# Patient Record
Sex: FEMALE | Race: WHITE | Employment: UNEMPLOYED | URBAN - METROPOLITAN AREA
[De-identification: names, ages, dates, MRNs, and addresses within clinical notes are randomized per-mention and may not be internally consistent; named-entity substitution may affect disease eponyms.]

---

## 2017-02-21 ENCOUNTER — APPOINTMENT (OUTPATIENT)
Dept: PHYSICAL THERAPY | Age: 52
End: 2017-02-21
Payer: COMMERCIAL

## 2017-02-28 ENCOUNTER — HOSPITAL ENCOUNTER (OUTPATIENT)
Dept: PHYSICAL THERAPY | Age: 52
Discharge: HOME | End: 2017-02-28
Payer: COMMERCIAL

## 2017-02-28 PROCEDURE — 97110 THERAPEUTIC EXERCISES: CPT

## 2017-02-28 PROCEDURE — 97161 PT EVAL LOW COMPLEX 20 MIN: CPT

## 2017-02-28 NOTE — PROGRESS NOTES
Elia Chavarria  : 1965 [facility] at [address]  [phone]   [phone]         OUTPATIENT PHYSICAL THERAPY:Initial Assessment, Treatment Day: Day of Assessment and PM 2017    ICD-10: Treatment Diagnosis: Low back pain (M54.5)           Low back pain (M54.5)   Muscle spasm of back (M62.830)   SPRAIN OF LIGAMENTS OF THE LUMBAR SPINE, SEQUELA S33. 5XXS          Precautions/Allergies:   Oxycodone   Fall Risk Score: 0 (? 5 = High Risk)  MD Orders: Eval and Treat  MEDICAL/REFERRING DIAGNOSIS:  Low back pain [M54.5]  Other chronic pain [G89.29]   DATE OF ONSET: Chronic  REFERRING PHYSICIAN: Kanchan Hoskins MD  RETURN PHYSICIAN APPOINTMENT: TBD by patient     INITIAL ASSESSMENT:  Ms. Elia Chavarria has attended 1 physical therapy session including initial evaluation. Elia Chavarria exhibits decreased flexibility, increased pain, decreased postural and core strength, decreased functional tolerance consistent with hypomobility lumbar spine. Pain is worse on R posterior hip/buttock and lower lumbar level as compared to Left. No pelvic malalignment upon initial evaluation but decreased posture. Elia Chavarria will benefit from skilled PT (medically necessary) to address above deficits affecting participation in basic ADLs and overall functional tolerance. Manual techniques (stretching, joint mobilizations, soft tissue mobilization/myofascial release), postural exercises/education, therapeutic techniques/activities, and HEP will be performed as appropriate addressing Adolph Mueller current condition. PROBLEM LIST (Impacting functional limitations):  1. Decreased Strength  2. Decreased ADL/Functional Activities  3. Decreased Transfer Abilities  4. Decreased Ambulation Ability/Technique  5. Decreased Balance  6. Increased Pain  7. Decreased Activity Tolerance  8. Increased Fatigue  9. Increased Shortness of Breath  10.  Decreased Flexibility/Joint Mobility  11. Decreased [place] with Home Exercise Program INTERVENTIONS PLANNED:  1. Balance Exercise  2. Bed Mobility  3. Cold  4. Cryotherapy  5. Electrical Stimulation  6. Family Education  7. Gait Training  8. Heat  9. Home Exercise Program (HEP)  10. Manual Therapy  11. Neuromuscular Re-education/Strengthening  12. Range of Motion (ROM)  13. Therapeutic Activites  14. Therapeutic Exercise/Strengthening  15. Transfer Training   TREATMENT PLAN:  Effective Dates: 2/28/17 TO 5.28.17. Frequency/Duration: 2 times a week for 12 weeks   GOALS: (Goals have been discussed and agreed upon with patient.)  Short Term Goals 4 weeks   1. Aaron Gloria will be independent with HEP  2. Aaron Gloria will participate in LE stretching program to increase flexibility  3. Aaron Gloria will participate in core stabilization exercises to help with stabilization during ADLs  4. Aaron Gloria will participate in LE strengthening program with weights as appropriate to help with gait and elevations  5. Aaron Gloria will participate in static and dynamic balance activities to decrease the risk for falls  6. Aaron Gloria will tolerate manual therapy/joint mobilizations/soft tissue to increase ROM and decrease pain  7. Long Term Goals 8 weeks   1. Aaron Gloria will demonstrate a 10 point improvement on the Oswestry to show improvement in function  2. Aaron Gloria will report 0/10 pain at rest and during ADLs  3. Aaron Gloria will demonstrate 5/5 LE strength on manual muscle testing  4. Aaron Gloria will be able to perform SLS >5 seconds bilaterally to help with gait and improve balance  Rehabilitation Potential For Stated Goals: Good  Regarding [facility], I certify that the treatment plan above will be carried out by a therapist or under their direction.   Thank you for this referral,  Antonio Koo DPT     Referring Physician Signature: Geneva Hu MD              Date                    HISTORY:   History of Present Injury/Illness (Reason for Referral):  I've had this pain for 4-5 years. I think it's arthritis, but I've never had it Xray. I told the MD about it and he sent me here. I take Prednizone for my back pain, but it didn't help. The pain sometimes goes down my legs but not past my knee. The pain is on the R side when it radiates down sometimes--not daily. My back pain is just about every day. I am at about a 6/10. The pain gets worse or stays the same, but it doesn't improve more than a 6/10. The pain keeps me up at night. The pain is worse in the morning. Past Medical History/Comorbidities:   Ms. Josemanuel Arellano  has a past medical history of Allergic rhinitis; Anxiety; Deafness; Dysthymia (or depressive neurosis); GERD (gastroesophageal reflux disease); Migraine with aura; and Osteoarthritis. Ms. Josemanuel Arellano  has a past surgical history that includes other surgical; tonsil and adenoidectomy; wisdom teeth extraction; tubal ligation; dilation and curettage; and colonoscopy (2013). Social History/Living Environment:       Social History     Social History    Marital status:      Spouse name: N/A    Number of children: N/A    Years of education: N/A     Occupational History    homemaker       1 year college     Social History Main Topics    Smoking status: Never Smoker    Smokeless tobacco: Never Used    Alcohol use No    Drug use: No    Sexual activity: Not on file     Other Topics Concern    Not on file     Social History Narrative     Prior Level of Function/Work/Activity:  Independent with regards to ambulation  Note: Patient denies any increase of symptoms with cough, sneeze or valsalva. Patient denies any saddle paresthesia or bowel/bladder deficits. Personal Factors:          Sex:  female        Age:  45 y.o.   Current Medications:    Current Outpatient Prescriptions:     LORazepam (ATIVAN) 1 mg tablet, Take 1 mg by mouth., Disp: , Rfl:     raNITIdine (ZANTAC) 15 mg/mL syrup, Take 75 mg by mouth., Disp: , Rfl:     busPIRone (BUSPAR) 10 mg tablet, TK ONE T PO BID, Disp: , Rfl:     prazosin (MINIPRESS) 1 mg capsule, Take 1 mg by mouth., Disp: , Rfl:     guanFACINE (TENEX) 1 mg tablet, TK 1 T PO QD, Disp: , Rfl: 4    PROCTOZONE-HC 2.5 % rectal cream, I REC BID, Disp: , Rfl: 0    venlafaxine-SR (EFFEXOR-XR) 75 mg capsule, TK ONE C PO  QAM, Disp: , Rfl: 5    zolpidem (AMBIEN) 5 mg tablet, TK 1 T PO  QHS PRN, Disp: , Rfl: 3    neomycin-polymyxin-hydrocortisone, buffered, (PEDIOTIC) 3.5-10,000-1 mg/mL-unit/mL-% otic suspension, Administer 3 Drops in left ear four (4) times daily. , Disp: 30 mL, Rfl: 0    naproxen (NAPROSYN) 375 mg tablet, Take 1 Tab by mouth two (2) times daily (with meals). , Disp: 20 Tab, Rfl: 0    escitalopram oxalate (LEXAPRO) 20 mg tablet, Take 1 Tab by mouth daily. , Disp: 30 Tab, Rfl: 3    SUMAtriptan (IMITREX) 100 mg tablet, Take 1 Tab by mouth once as needed for Migraine for up to 1 dose., Disp: 9 Tab, Rfl: 3     Date Last Reviewed:  2/28/2017   Number of Personal Factors/Comorbidities that affect the Plan of Care: 0: LOW COMPLEXITY   EXAMINATION:   Observation/Orthostatic Postural Assessment:          Leans to R side in sitting, Forward head and rounded shoulders. No AD with gait. Palpation:          Sacrum and L4/5 on R as well as L. PSIS bilaterally. Minimal improvement with STM. Grade II mobs not effective per patient response. Pain sensitive. ROM:            AROM/PROM         Joint: Eval Date: 2.28.17  Re-Assess Date:  Re-Assess Date:     RIGHT LEFT RIGHT LEFT RIGHT LEFT   Knee Extension [facility]       Knee Flexion [facility]       Hip Flexion 75 deg Ham 90/90 80 deg Ham 90/90       Hip Abduction [facility] WFL       Lumbar Flexion 80% with throbbing pain        Lumbar Extension 80% with sharp pain to middle back (8/10).         Lumbar Side-bending 80% with sharp pain to middle of her back L3-5 level--8/10. 80% with sharp pain to middle of her back L3-5 level--8/10       Lumbar Rotation 80% with pain to lower back (8/10) sharp pain. 90% with 6/10 pain to lower back         Strength:    Joint: Eval Date: 2.28.17  Re-Assess Date:  Re-Assess Date:     RIGHT LEFT RIGHT LEFT RIGHT LEFT   Knee Flexion 4+/5 5/5       Knee Extension 4/5 5/5       Hip Flexion 4/5 4+/5       Hip Abduction 4/5 4+/5       Ankle Dorsiflexion 5/5 5/5                           Single leg stance right/left: No difficulty              Deep squat: Unable to pick things up all the time at home due to pain---not able to perform deep squat, but can lift light objects off the floor. Ham 90/90:   RIGHT LE: 75 deg   LEFT LE: 80 deg    Special Tests: Assessed @ Initial Visit    JOSSY 4:  Decreased flexibility bilaterally (R>L)   SLUMP TEST:  Negative for neurological symptoms. Neurological Screen: Assessed @ Initial Visit    RADIATING SYMPTOMS?: YES/NO--Occasionally goes down her R leg but not past buttock. No radiating pain upon PT evaluation. Functional Mobility:  Assessed @ Initial Visit Affecting participation in basic ADLs and functional tasks. Balance:          No difficulty. , No Hx of falls. Body Structures Involved:  1. Nerves  2. Joints  3. Muscles  4. Ligaments Body Functions Affected:  1. Sensory/Pain  2. Neuromusculoskeletal  3. Movement Related Activities and Participation Affected:  1. Mobility  2. Self Care   Number of elements that affect the Plan of Care: 4+: HIGH COMPLEXITY   CLINICAL PRESENTATION:   Presentation: Stable and uncomplicated: LOW COMPLEXITY   CLINICAL DECISION MAKING:   Outcome Measure: Tool Used: Modified Oswestry Low Back Pain Questionnaire  Score:  Initial: 21/50  Most Recent: X/50 (Date: -- )   Interpretation of Score: Each section is scored on a 0-5 scale, 5 representing the greatest disability. The scores of each section are added together for a total score of 50. Score 0 1-10 11-20 21-30 31-40 41-49 50   Modifier CH CI CJ CK CL CM CN     ?  Mobility - Walking and Moving Around:     - CURRENT STATUS: CK - 40%-59% impaired, limited or restricted    - GOAL STATUS: CJ - 20%-39% impaired, limited or restricted    - D/C STATUS:  ---------------To be determined---------------    Medical Necessity:   · Skilled intervention continues to be required due to above deficits affecting participation in basic ADLs and overall functional tolerance. Reason for Services/Other Comments:  · Patient continues to require skilled intervention due to  above deficits affecting participation in basic ADLs and overall functional tolerance. Use of outcome tool(s) and clinical judgement create a POC that gives a: Clear prediction of patient's progress: LOW COMPLEXITY   TREATMENT:   (In addition to Assessment/Re-Assessment sessions the following treatments were rendered)  THERAPEUTIC EXERCISE: (10 minutes):  Exercises per grid below to improve mobility, strength and balance. Required minimal visual and verbal cues to promote proper body alignment and promote proper body posture. Progressed resistance, range and complexity of movement as indicated. Date:  2/28/17 Date:   Date:     Activity/Exercise Parameters Parameters Parameters   Hamstring Stretch 30\"x3     LTR 10\"X10     SKTC 30\"x3                                   MANUAL THERAPY: ( minutes): Joint mobilization, Soft tissue mobilization and Manipulation was utilized and necessary because of the patient's restricted joint motion, painful spasm, restricted motion of soft tissue. (Used abbreviations: MET - muscle energy technique; PNF - proprioceptive neuromuscular facilitation; NMR - neuromuscular re-education; AP - anterior to posterior; PA - posterior to anterior)    MODALITIES: (unbillable  minutes):   Performed MHP with Amanda White prone end of session -- provided with call button and asked  To inform PT if MHP gets too warm. No adverse reactions end of session. Treatment/Session Assessment:  Verbalized understanding of POC and PT role + HEP. Recommending MHP beginning of session and myofascial release/STM to R low back. Minimal exercise at the beginning (maybe stretching?)  · Pain/ Symptoms: Initial:   6/10 Post Session:  5/10 ·   Compliance with Program/Exercises: Will assess as treatment progresses. · Recommendations/Intent for next treatment session: \"Next visit will focus on advancements to more challenging activities\".   Total Treatment Duration:  PT Patient Time In/Time Out  Time In: 1300  Time Out: [address], DPT

## 2017-02-28 NOTE — PROGRESS NOTES
Ambulatory/Rehab Services H2 Model Falls Risk Assessment    Risk Factor Pts. ·   Confusion/Disorientation/Impulsivity  []    4 ·   Symptomatic Depression  []   2 ·   Altered Elimination  []   1 ·   Dizziness/Vertigo  []   1 ·   Gender (Male)  []   1 ·   Any administered antiepileptics (anticonvulsants):  []   2 ·   Any administered benzodiazepines:  []   1 ·   Visual Impairment (specify):  []   1 ·   Portable Oxygen Use  []   1 ·   Orthostatic ? BP  []   1 ·   History of Recent Falls (within 3 mos.)  []   5     Ability to Rise from Chair (choose one) Pts. ·   Ability to rise in a single movement  []   0 ·   Pushes up, successful in one attempt  []   1 ·   Multiple attempts, but successful  []   3 ·   Unable to rise without assistance  []   4   Total: (5 or greater = High Risk) 0     Falls Prevention Plan:   []                Physical Limitations to Exercise (specify):   []                Mobility Assistance Device (type):   []                Exercise/Equipment Adaptation (specify):    ©2010 [facility] [address] Patent #[id number].  Federal Law prohibits the replication, distribution or use without written permission from [facility] [employer]

## 2017-03-21 ENCOUNTER — APPOINTMENT (OUTPATIENT)
Dept: PHYSICAL THERAPY | Age: 52
End: 2017-03-21

## 2017-03-28 ENCOUNTER — APPOINTMENT (OUTPATIENT)
Dept: PHYSICAL THERAPY | Age: 52
End: 2017-03-28

## 2017-07-18 PROBLEM — R87.612 LGSIL ON PAP SMEAR OF CERVIX: Status: ACTIVE | Noted: 2017-03-06

## 2017-07-18 PROBLEM — R87.612 LGSIL ON PAP SMEAR OF CERVIX: Status: RESOLVED | Noted: 2017-03-06 | Resolved: 2017-07-18

## 2023-04-05 ENCOUNTER — APPOINTMENT (OUTPATIENT)
Dept: CT IMAGING | Age: 58
End: 2023-04-05
Payer: COMMERCIAL

## 2023-04-05 ENCOUNTER — HOSPITAL ENCOUNTER (EMERGENCY)
Age: 58
Discharge: HOME | End: 2023-04-05
Attending: EMERGENCY MEDICINE
Payer: COMMERCIAL

## 2023-04-05 VITALS
SYSTOLIC BLOOD PRESSURE: 115 MMHG | TEMPERATURE: 98.8 F | DIASTOLIC BLOOD PRESSURE: 67 MMHG | WEIGHT: 120 LBS | HEART RATE: 66 BPM | BODY MASS INDEX: 24.19 KG/M2 | RESPIRATION RATE: 16 BRPM | OXYGEN SATURATION: 96 % | HEIGHT: 59 IN

## 2023-04-05 DIAGNOSIS — R10.84 GENERALIZED ABDOMINAL PAIN: Primary | ICD-10-CM

## 2023-04-05 LAB
ALBUMIN SERPL-MCNC: 4.2 G/DL (ref 3.5–5)
ALBUMIN/GLOB SERPL: 1.1 (ref 0.4–1.6)
ALP SERPL-CCNC: 50 U/L (ref 50–136)
ALT SERPL-CCNC: 27 U/L (ref 12–65)
ANION GAP SERPL CALC-SCNC: 2 MMOL/L (ref 2–11)
AST SERPL-CCNC: 15 U/L (ref 15–37)
BASOPHILS # BLD: 0 K/UL (ref 0–0.2)
BASOPHILS NFR BLD: 0 % (ref 0–2)
BILIRUB SERPL-MCNC: 0.5 MG/DL (ref 0.2–1.1)
BILIRUB UR QL: NEGATIVE
BUN SERPL-MCNC: 15 MG/DL (ref 6–23)
CALCIUM SERPL-MCNC: 9.8 MG/DL (ref 8.3–10.4)
CHLORIDE SERPL-SCNC: 108 MMOL/L (ref 101–110)
CO2 SERPL-SCNC: 29 MMOL/L (ref 21–32)
CREAT SERPL-MCNC: 0.7 MG/DL (ref 0.6–1)
DIFFERENTIAL METHOD BLD: ABNORMAL
EOSINOPHIL # BLD: 0.1 K/UL (ref 0–0.8)
EOSINOPHIL NFR BLD: 1 % (ref 0.5–7.8)
ERYTHROCYTE [DISTWIDTH] IN BLOOD BY AUTOMATED COUNT: 11.8 % (ref 11.9–14.6)
GLOBULIN SER CALC-MCNC: 3.9 G/DL (ref 2.8–4.5)
GLUCOSE SERPL-MCNC: 90 MG/DL (ref 65–100)
GLUCOSE UR QL STRIP.AUTO: NEGATIVE MG/DL
HCT VFR BLD AUTO: 41.7 % (ref 35.8–46.3)
HGB BLD-MCNC: 14 G/DL (ref 11.7–15.4)
IMM GRANULOCYTES # BLD AUTO: 0 K/UL (ref 0–0.5)
IMM GRANULOCYTES NFR BLD AUTO: 0 % (ref 0–5)
KETONES UR-MCNC: NEGATIVE MG/DL
LACTATE SERPL-SCNC: 0.9 MMOL/L (ref 0.4–2)
LEUKOCYTE ESTERASE UR QL STRIP: NEGATIVE
LIPASE SERPL-CCNC: 88 U/L (ref 73–393)
LYMPHOCYTES # BLD: 1.5 K/UL (ref 0.5–4.6)
LYMPHOCYTES NFR BLD: 21 % (ref 13–44)
MCH RBC QN AUTO: 32.6 PG (ref 26.1–32.9)
MCHC RBC AUTO-ENTMCNC: 33.6 G/DL (ref 31.4–35)
MCV RBC AUTO: 97.2 FL (ref 82–102)
MONOCYTES # BLD: 0.4 K/UL (ref 0.1–1.3)
MONOCYTES NFR BLD: 6 % (ref 4–12)
NEUTS SEG # BLD: 5 K/UL (ref 1.7–8.2)
NEUTS SEG NFR BLD: 72 % (ref 43–78)
NITRITE UR QL: NEGATIVE
NRBC # BLD: 0 K/UL (ref 0–0.2)
PH UR: 5.5 (ref 5–9)
PLATELET # BLD AUTO: 300 K/UL (ref 150–450)
PMV BLD AUTO: 9.6 FL (ref 9.4–12.3)
POTASSIUM SERPL-SCNC: 4.4 MMOL/L (ref 3.5–5.1)
PROT SERPL-MCNC: 8.1 G/DL (ref 6.3–8.2)
PROT UR QL: NEGATIVE MG/DL
RBC # BLD AUTO: 4.29 M/UL (ref 4.05–5.2)
RBC # UR STRIP: NEGATIVE
SERVICE CMNT-IMP: ABNORMAL
SODIUM SERPL-SCNC: 139 MMOL/L (ref 133–143)
SP GR UR: 1.02 (ref 1–1.02)
UROBILINOGEN UR QL: 0.2 EU/DL (ref 0.2–1)
WBC # BLD AUTO: 7.1 K/UL (ref 4.3–11.1)

## 2023-04-05 PROCEDURE — 6360000004 HC RX CONTRAST MEDICATION: Performed by: STUDENT IN AN ORGANIZED HEALTH CARE EDUCATION/TRAINING PROGRAM

## 2023-04-05 PROCEDURE — 96374 THER/PROPH/DIAG INJ IV PUSH: CPT

## 2023-04-05 PROCEDURE — 85025 COMPLETE CBC W/AUTO DIFF WBC: CPT

## 2023-04-05 PROCEDURE — 6370000000 HC RX 637 (ALT 250 FOR IP): Performed by: STUDENT IN AN ORGANIZED HEALTH CARE EDUCATION/TRAINING PROGRAM

## 2023-04-05 PROCEDURE — 83690 ASSAY OF LIPASE: CPT

## 2023-04-05 PROCEDURE — 96375 TX/PRO/DX INJ NEW DRUG ADDON: CPT

## 2023-04-05 PROCEDURE — 99285 EMERGENCY DEPT VISIT HI MDM: CPT

## 2023-04-05 PROCEDURE — 74177 CT ABD & PELVIS W/CONTRAST: CPT

## 2023-04-05 PROCEDURE — 80053 COMPREHEN METABOLIC PANEL: CPT

## 2023-04-05 PROCEDURE — 83605 ASSAY OF LACTIC ACID: CPT

## 2023-04-05 PROCEDURE — 81003 URINALYSIS AUTO W/O SCOPE: CPT

## 2023-04-05 PROCEDURE — 2580000003 HC RX 258: Performed by: STUDENT IN AN ORGANIZED HEALTH CARE EDUCATION/TRAINING PROGRAM

## 2023-04-05 PROCEDURE — 6360000002 HC RX W HCPCS: Performed by: STUDENT IN AN ORGANIZED HEALTH CARE EDUCATION/TRAINING PROGRAM

## 2023-04-05 RX ORDER — SODIUM CHLORIDE 0.9 % (FLUSH) 0.9 %
10 SYRINGE (ML) INJECTION
Status: COMPLETED | OUTPATIENT
Start: 2023-04-05 | End: 2023-04-05

## 2023-04-05 RX ORDER — ONDANSETRON 2 MG/ML
4 INJECTION INTRAMUSCULAR; INTRAVENOUS
Status: COMPLETED | OUTPATIENT
Start: 2023-04-05 | End: 2023-04-05

## 2023-04-05 RX ORDER — PANTOPRAZOLE SODIUM 40 MG/1
40 TABLET, DELAYED RELEASE ORAL DAILY
Qty: 30 TABLET | Refills: 0 | Status: SHIPPED | OUTPATIENT
Start: 2023-04-05 | End: 2023-05-05

## 2023-04-05 RX ORDER — KETOROLAC TROMETHAMINE 15 MG/ML
15 INJECTION, SOLUTION INTRAMUSCULAR; INTRAVENOUS ONCE
Status: COMPLETED | OUTPATIENT
Start: 2023-04-05 | End: 2023-04-05

## 2023-04-05 RX ORDER — 0.9 % SODIUM CHLORIDE 0.9 %
100 INTRAVENOUS SOLUTION INTRAVENOUS ONCE
Status: COMPLETED | OUTPATIENT
Start: 2023-04-05 | End: 2023-04-05

## 2023-04-05 RX ORDER — HYOSCYAMINE SULFATE 0.12 MG/1
1 TABLET SUBLINGUAL 3 TIMES DAILY PRN
Qty: 60 EACH | Refills: 0 | Status: SHIPPED | OUTPATIENT
Start: 2023-04-05

## 2023-04-05 RX ORDER — HYDROCODONE BITARTRATE AND ACETAMINOPHEN 5; 325 MG/1; MG/1
1 TABLET ORAL
Status: COMPLETED | OUTPATIENT
Start: 2023-04-05 | End: 2023-04-05

## 2023-04-05 RX ORDER — ONDANSETRON 4 MG/1
4 TABLET, FILM COATED ORAL 3 TIMES DAILY PRN
Qty: 15 TABLET | Refills: 0 | Status: SHIPPED | OUTPATIENT
Start: 2023-04-05

## 2023-04-05 RX ADMIN — SODIUM CHLORIDE, PRESERVATIVE FREE 10 ML: 5 INJECTION INTRAVENOUS at 13:07

## 2023-04-05 RX ADMIN — SODIUM CHLORIDE 100 ML: 9 INJECTION, SOLUTION INTRAVENOUS at 13:07

## 2023-04-05 RX ADMIN — KETOROLAC TROMETHAMINE 15 MG: 15 INJECTION, SOLUTION INTRAMUSCULAR; INTRAVENOUS at 13:29

## 2023-04-05 RX ADMIN — HYDROCODONE BITARTRATE AND ACETAMINOPHEN 1 TABLET: 5; 325 TABLET ORAL at 13:28

## 2023-04-05 RX ADMIN — IOPAMIDOL 100 ML: 755 INJECTION, SOLUTION INTRAVENOUS at 13:07

## 2023-04-05 RX ADMIN — ONDANSETRON 4 MG: 2 INJECTION INTRAMUSCULAR; INTRAVENOUS at 13:30

## 2023-04-05 RX ADMIN — HYOSCYAMINE SULFATE 250 MCG: 0.12 TABLET ORAL; SUBLINGUAL at 13:27

## 2023-04-05 ASSESSMENT — ENCOUNTER SYMPTOMS
EYE PAIN: 0
EYE DISCHARGE: 0
TROUBLE SWALLOWING: 0
DIARRHEA: 0
SHORTNESS OF BREATH: 0
VOMITING: 0
VOICE CHANGE: 0
RHINORRHEA: 0
ABDOMINAL PAIN: 1
NAUSEA: 1
PHOTOPHOBIA: 0
FACIAL SWELLING: 0
CHEST TIGHTNESS: 0
EYE REDNESS: 0
SORE THROAT: 0
APNEA: 0
COUGH: 0
WHEEZING: 0

## 2023-04-05 ASSESSMENT — PAIN DESCRIPTION - LOCATION
LOCATION: ABDOMEN

## 2023-04-05 ASSESSMENT — PAIN SCALES - GENERAL
PAINLEVEL_OUTOF10: 7
PAINLEVEL_OUTOF10: 3
PAINLEVEL_OUTOF10: 6

## 2023-04-05 ASSESSMENT — PAIN - FUNCTIONAL ASSESSMENT: PAIN_FUNCTIONAL_ASSESSMENT: 0-10

## 2023-04-05 NOTE — Clinical Note
Vicenta Carver was seen and treated in our emergency department on 4/5/2023. She may return to work on 04/06/2023. If you have any questions or concerns, please don't hesitate to call.       Jorge Alberto Crespo

## 2023-04-05 NOTE — DISCHARGE INSTRUCTIONS
Your work-up is reassuring today. There is no clear cause of your symptoms. It is possible you have some stomach inflammation or some constipation. You may try the prescribed medications to help relieve your symptoms. Return in 24 to 48 hours if your symptoms worsen or persist.  Follow-up with your family doctor otherwise. As we discussed, I did not find a life threatening cause of your symptoms today. However, THAT DOES NOT MEAN IT COULD NOT DEVELOP. If you develop ANY new or worsening symptoms, it is critical that you return for re-evaluation. This includes any symptoms that are concerning to you, especially symptoms such as fever, severe pain, blood in stool, vomiting. If you do not return for re-evaluation, you risk serious complications, including death.

## 2023-04-05 NOTE — ED PROVIDER NOTES
discharge. Left eye: No discharge. Conjunctiva/sclera: Conjunctivae normal.   Cardiovascular:      Rate and Rhythm: Normal rate and regular rhythm. Pulses: Normal pulses. Heart sounds: Normal heart sounds. Pulmonary:      Effort: Pulmonary effort is normal. No respiratory distress. Breath sounds: Normal breath sounds. No stridor. No wheezing, rhonchi or rales. Abdominal:      General: Abdomen is flat. There is distension (Very mild). Palpations: Abdomen is soft. Tenderness: There is abdominal tenderness in the periumbilical area and left lower quadrant. There is no right CVA tenderness, left CVA tenderness, guarding or rebound. Negative signs include White's sign, Rovsing's sign, McBurney's sign, psoas sign and obturator sign. Hernia: No hernia is present. Genitourinary:     Comments: Declined  Musculoskeletal:         General: Normal range of motion. Cervical back: Normal range of motion and neck supple. Skin:     General: Skin is warm and dry. Capillary Refill: Capillary refill takes less than 2 seconds. Coloration: Skin is not jaundiced. Neurological:      General: No focal deficit present. Mental Status: She is alert and oriented to person, place, and time. Mental status is at baseline.         Procedures     Procedures    Orders Placed This Encounter   Procedures    CT ABDOMEN PELVIS W IV CONTRAST Additional Contrast? None    CBC with Diff    CMP    Lipase    Lactate, Sepsis (Select if patient is over 65 to rule out mesenteric ischemia)    Diet NPO    POCT Urine Dipstick    POCT Urinalysis no Micro    Saline lock IV        Medications   hyoscyamine (LEVSIN/SL) sublingual tablet 250 mcg (250 mcg SubLINGual Given 4/5/23 1327)   ketorolac (TORADOL) injection 15 mg (15 mg IntraVENous Given 4/5/23 1329)   ondansetron (ZOFRAN) injection 4 mg (4 mg IntraVENous Given 4/5/23 1330)   HYDROcodone-acetaminophen (NORCO) 5-325 MG per tablet 1 tablet (1

## 2023-04-05 NOTE — ED NOTES
I have reviewed discharge instructions with the patient. The patient verbalized understanding. Patient left ED via Discharge Method: ambulatory to Home with mother. Opportunity for questions and clarification provided. Patient given 3 scripts. To continue your aftercare when you leave the hospital, you may receive an automated call from our care team to check in on how you are doing. This is a free service and part of our promise to provide the best care and service to meet your aftercare needs.  If you have questions, or wish to unsubscribe from this service please call [phone]. Thank you for Choosing our [facility] Emergency Department.        Tomeka Ingram RN  04/05/23 7440

## 2023-04-05 NOTE — ED TRIAGE NOTES
Pt ambulatory to triage c/o abdominal pain 7/10 x 4 days. Nausea and bloating with abdominal swelling.  Last BM this morning. (-) Fevers

## 2025-04-10 ENCOUNTER — HOSPITAL ENCOUNTER (INPATIENT)
Age: 60
LOS: 10 days | Discharge: HOME | DRG: 234 | End: 2025-04-21
Attending: INTERNAL MEDICINE | Admitting: THORACIC SURGERY (CARDIOTHORACIC VASCULAR SURGERY)
Payer: COMMERCIAL

## 2025-04-10 ENCOUNTER — APPOINTMENT (OUTPATIENT)
Dept: GENERAL RADIOLOGY | Age: 60
DRG: 234 | End: 2025-04-10
Payer: COMMERCIAL

## 2025-04-10 ENCOUNTER — HOSPITAL ENCOUNTER (EMERGENCY)
Age: 60
Discharge: ANOTHER HOSPITAL | DRG: 234 | End: 2025-04-10
Attending: STUDENT IN AN ORGANIZED HEALTH CARE EDUCATION/TRAINING PROGRAM
Payer: COMMERCIAL

## 2025-04-10 VITALS
HEIGHT: 58 IN | BODY MASS INDEX: 25.82 KG/M2 | RESPIRATION RATE: 18 BRPM | SYSTOLIC BLOOD PRESSURE: 112 MMHG | DIASTOLIC BLOOD PRESSURE: 98 MMHG | TEMPERATURE: 98.1 F | HEART RATE: 83 BPM | OXYGEN SATURATION: 94 % | WEIGHT: 123 LBS

## 2025-04-10 DIAGNOSIS — Z95.1 S/P CABG X 3: ICD-10-CM

## 2025-04-10 DIAGNOSIS — R07.9 CHEST PAIN, UNSPECIFIED TYPE: Primary | ICD-10-CM

## 2025-04-10 DIAGNOSIS — I21.4 NSTEMI (NON-ST ELEVATED MYOCARDIAL INFARCTION) (HCC): ICD-10-CM

## 2025-04-10 DIAGNOSIS — I21.4 NSTEMI (NON-ST ELEVATED MYOCARDIAL INFARCTION) (HCC): Primary | ICD-10-CM

## 2025-04-10 DIAGNOSIS — R07.9 CHEST PAIN: ICD-10-CM

## 2025-04-10 PROBLEM — R73.09 ELEVATED GLUCOSE LEVEL: Status: ACTIVE | Noted: 2025-04-10

## 2025-04-10 LAB
ALBUMIN SERPL-MCNC: 4.2 G/DL (ref 3.2–4.6)
ALBUMIN/GLOB SERPL: 1.4 (ref 1–1.9)
ALP SERPL-CCNC: 63 U/L (ref 35–104)
ALT SERPL-CCNC: 16 U/L (ref 12–65)
ANION GAP SERPL CALC-SCNC: 13 MMOL/L (ref 7–16)
APTT PPP: 21.6 SEC (ref 23.3–37.4)
AST SERPL-CCNC: 37 U/L (ref 15–37)
BASOPHILS # BLD: 0.02 K/UL (ref 0–0.2)
BASOPHILS NFR BLD: 0.2 % (ref 0–2)
BILIRUB SERPL-MCNC: 0.3 MG/DL (ref 0–1.2)
BUN SERPL-MCNC: 22 MG/DL (ref 8–23)
CALCIUM SERPL-MCNC: 9.4 MG/DL (ref 8.8–10.2)
CHLORIDE SERPL-SCNC: 101 MMOL/L (ref 98–107)
CO2 SERPL-SCNC: 23 MMOL/L (ref 20–29)
CREAT SERPL-MCNC: 0.66 MG/DL (ref 0.8–1.3)
CRP SERPL-MCNC: <0.3 MG/DL (ref 0–0.9)
D DIMER PPP FEU-MCNC: 0.41 UG/ML(FEU)
DIFFERENTIAL METHOD BLD: ABNORMAL
EOSINOPHIL # BLD: 0.07 K/UL (ref 0–0.8)
EOSINOPHIL NFR BLD: 0.6 % (ref 0.5–7.8)
ERYTHROCYTE [DISTWIDTH] IN BLOOD BY AUTOMATED COUNT: 11.9 % (ref 11.9–14.6)
ERYTHROCYTE [SEDIMENTATION RATE] IN BLOOD: 18 MM/HR (ref 0–30)
GLOBULIN SER CALC-MCNC: 3.1 G/DL (ref 2.3–3.5)
GLUCOSE SERPL-MCNC: 214 MG/DL (ref 65–100)
HCT VFR BLD AUTO: 39.8 % (ref 35.8–46.3)
HGB BLD-MCNC: 13.6 G/DL (ref 11.7–15.4)
IMM GRANULOCYTES # BLD AUTO: 0.05 K/UL (ref 0–0.5)
IMM GRANULOCYTES NFR BLD AUTO: 0.5 % (ref 0–5)
INR PPP: 0.9
LYMPHOCYTES # BLD: 1.33 K/UL (ref 0.5–4.6)
LYMPHOCYTES NFR BLD: 12 % (ref 13–44)
MCH RBC QN AUTO: 32 PG (ref 26.1–32.9)
MCHC RBC AUTO-ENTMCNC: 34.2 G/DL (ref 31.4–35)
MCV RBC AUTO: 93.6 FL (ref 82–102)
MONOCYTES # BLD: 0.67 K/UL (ref 0.1–1.3)
MONOCYTES NFR BLD: 6 % (ref 4–12)
NEUTS SEG # BLD: 8.95 K/UL (ref 1.7–8.2)
NEUTS SEG NFR BLD: 80.7 % (ref 43–78)
NRBC # BLD: 0 K/UL (ref 0–0.2)
NT PRO BNP: 994 PG/ML (ref 0–450)
PLATELET # BLD AUTO: 242 K/UL (ref 150–450)
PMV BLD AUTO: 9.8 FL (ref 9.4–12.3)
POTASSIUM SERPL-SCNC: 4.1 MMOL/L (ref 3.5–5.1)
PROT SERPL-MCNC: 7.3 G/DL (ref 6.3–8.2)
PROTHROMBIN TIME: 11.9 SEC (ref 11.3–14.9)
RBC # BLD AUTO: 4.25 M/UL (ref 4.05–5.2)
SODIUM SERPL-SCNC: 137 MMOL/L (ref 133–143)
TROPONIN T SERPL HS-MCNC: 244.9 NG/L (ref 0–14)
TROPONIN T SERPL HS-MCNC: 245.4 NG/L (ref 0–14)
WBC # BLD AUTO: 11.1 K/UL (ref 4.3–11.1)

## 2025-04-10 PROCEDURE — 85025 COMPLETE CBC W/AUTO DIFF WBC: CPT

## 2025-04-10 PROCEDURE — 85379 FIBRIN DEGRADATION QUANT: CPT

## 2025-04-10 PROCEDURE — 71046 X-RAY EXAM CHEST 2 VIEWS: CPT

## 2025-04-10 PROCEDURE — 84484 ASSAY OF TROPONIN QUANT: CPT

## 2025-04-10 PROCEDURE — 85652 RBC SED RATE AUTOMATED: CPT

## 2025-04-10 PROCEDURE — 6360000002 HC RX W HCPCS: Performed by: STUDENT IN AN ORGANIZED HEALTH CARE EDUCATION/TRAINING PROGRAM

## 2025-04-10 PROCEDURE — 85730 THROMBOPLASTIN TIME PARTIAL: CPT

## 2025-04-10 PROCEDURE — 83880 ASSAY OF NATRIURETIC PEPTIDE: CPT

## 2025-04-10 PROCEDURE — 86140 C-REACTIVE PROTEIN: CPT

## 2025-04-10 PROCEDURE — 72100 X-RAY EXAM L-S SPINE 2/3 VWS: CPT

## 2025-04-10 PROCEDURE — 72040 X-RAY EXAM NECK SPINE 2-3 VW: CPT

## 2025-04-10 PROCEDURE — 80053 COMPREHEN METABOLIC PANEL: CPT

## 2025-04-10 PROCEDURE — 6370000000 HC RX 637 (ALT 250 FOR IP): Performed by: STUDENT IN AN ORGANIZED HEALTH CARE EDUCATION/TRAINING PROGRAM

## 2025-04-10 PROCEDURE — 85610 PROTHROMBIN TIME: CPT

## 2025-04-10 PROCEDURE — 2140000000 HC CCU INTERMEDIATE R&B

## 2025-04-10 PROCEDURE — 93005 ELECTROCARDIOGRAM TRACING: CPT | Performed by: STUDENT IN AN ORGANIZED HEALTH CARE EDUCATION/TRAINING PROGRAM

## 2025-04-10 RX ORDER — HEPARIN SODIUM 1000 [USP'U]/ML
60 INJECTION, SOLUTION INTRAVENOUS; SUBCUTANEOUS PRN
Status: DISCONTINUED | OUTPATIENT
Start: 2025-04-10 | End: 2025-04-10 | Stop reason: HOSPADM

## 2025-04-10 RX ORDER — ASPIRIN 325 MG
325 TABLET ORAL
Status: COMPLETED | OUTPATIENT
Start: 2025-04-10 | End: 2025-04-10

## 2025-04-10 RX ORDER — METOPROLOL TARTRATE 25 MG/1
25 TABLET, FILM COATED ORAL 2 TIMES DAILY
Status: CANCELLED | OUTPATIENT
Start: 2025-04-10

## 2025-04-10 RX ORDER — DIPHENHYDRAMINE HCL 25 MG
50 CAPSULE ORAL NIGHTLY PRN
Status: DISCONTINUED | OUTPATIENT
Start: 2025-04-10 | End: 2025-04-11

## 2025-04-10 RX ORDER — HEPARIN SODIUM 10000 [USP'U]/100ML
5-30 INJECTION, SOLUTION INTRAVENOUS CONTINUOUS
Status: DISCONTINUED | OUTPATIENT
Start: 2025-04-10 | End: 2025-04-10 | Stop reason: HOSPADM

## 2025-04-10 RX ORDER — HEPARIN SODIUM 1000 [USP'U]/ML
60 INJECTION, SOLUTION INTRAVENOUS; SUBCUTANEOUS ONCE
Status: COMPLETED | OUTPATIENT
Start: 2025-04-10 | End: 2025-04-10

## 2025-04-10 RX ORDER — HEPARIN SODIUM 1000 [USP'U]/ML
30 INJECTION, SOLUTION INTRAVENOUS; SUBCUTANEOUS PRN
Status: DISCONTINUED | OUTPATIENT
Start: 2025-04-10 | End: 2025-04-10 | Stop reason: HOSPADM

## 2025-04-10 RX ADMIN — ASPIRIN 325 MG: 325 TABLET, FILM COATED ORAL at 22:27

## 2025-04-10 RX ADMIN — HEPARIN SODIUM 12 UNITS/KG/HR: 10000 INJECTION, SOLUTION INTRAVENOUS at 22:44

## 2025-04-10 RX ADMIN — HEPARIN SODIUM 3300 UNITS: 1000 INJECTION INTRAVENOUS; SUBCUTANEOUS at 22:40

## 2025-04-10 ASSESSMENT — PAIN - FUNCTIONAL ASSESSMENT: PAIN_FUNCTIONAL_ASSESSMENT: 0-10

## 2025-04-10 ASSESSMENT — PAIN SCALES - GENERAL
PAINLEVEL_OUTOF10: 0
PAINLEVEL_OUTOF10: 5

## 2025-04-11 ENCOUNTER — APPOINTMENT (OUTPATIENT)
Dept: NON INVASIVE DIAGNOSTICS | Age: 60
DRG: 234 | End: 2025-04-11
Attending: INTERNAL MEDICINE
Payer: COMMERCIAL

## 2025-04-11 ENCOUNTER — APPOINTMENT (OUTPATIENT)
Dept: ULTRASOUND IMAGING | Age: 60
DRG: 234 | End: 2025-04-11
Attending: INTERNAL MEDICINE
Payer: COMMERCIAL

## 2025-04-11 PROBLEM — F14.90 COCAINE USE: Chronic | Status: ACTIVE | Noted: 2025-04-11

## 2025-04-11 PROBLEM — I21.4 NSTEMI (NON-ST ELEVATED MYOCARDIAL INFARCTION) (HCC): Status: ACTIVE | Noted: 2025-04-10

## 2025-04-11 LAB
AMPHET UR QL SCN: NEGATIVE
ANION GAP SERPL CALC-SCNC: 9 MMOL/L (ref 7–16)
BARBITURATES UR QL SCN: NEGATIVE
BENZODIAZ UR QL: NEGATIVE
BUN SERPL-MCNC: 15 MG/DL (ref 8–23)
CALCIUM SERPL-MCNC: 8.9 MG/DL (ref 8.8–10.2)
CANNABINOIDS UR QL SCN: NEGATIVE
CHLORIDE SERPL-SCNC: 107 MMOL/L (ref 98–107)
CHOLEST SERPL-MCNC: 217 MG/DL (ref 0–200)
CO2 SERPL-SCNC: 23 MMOL/L (ref 20–29)
COCAINE UR QL SCN: NEGATIVE
CREAT SERPL-MCNC: 0.61 MG/DL (ref 0.6–1.1)
ECHO AO ASC DIAM: 2.7 CM
ECHO AO ASCENDING AORTA INDEX: 1.81 CM/M2
ECHO AO ROOT DIAM: 3.5 CM
ECHO AO ROOT INDEX: 2.35 CM/M2
ECHO AV AREA PEAK VELOCITY: 1.9 CM2
ECHO AV AREA VTI: 2.1 CM2
ECHO AV AREA/BSA PEAK VELOCITY: 1.3 CM2/M2
ECHO AV AREA/BSA VTI: 1.4 CM2/M2
ECHO AV MEAN GRADIENT: 4 MMHG
ECHO AV MEAN VELOCITY: 0.9 M/S
ECHO AV PEAK GRADIENT: 8 MMHG
ECHO AV PEAK GRADIENT: 8 MMHG
ECHO AV PEAK VELOCITY: 1.4 M/S
ECHO AV VELOCITY RATIO: 0.57
ECHO AV VTI: 27.8 CM
ECHO BSA: 1.52 M2
ECHO BSA: 1.52 M2
ECHO IVC PROX: 1.1 CM
ECHO LA AREA 2C: 15.9 CM2
ECHO LA AREA 4C: 12.5 CM2
ECHO LA MAJOR AXIS: 4.2 CM
ECHO LA MINOR AXIS: 4.5 CM
ECHO LA VOL BP: 37 ML (ref 22–52)
ECHO LA VOL MOD A2C: 44 ML (ref 22–52)
ECHO LA VOL MOD A4C: 29 ML (ref 22–52)
ECHO LA VOL/BSA BIPLANE: 25 ML/M2 (ref 16–34)
ECHO LA VOLUME INDEX MOD A2C: 30 ML/M2 (ref 16–34)
ECHO LA VOLUME INDEX MOD A4C: 19 ML/M2 (ref 16–34)
ECHO LV E' LATERAL VELOCITY: 9.46 CM/S
ECHO LV E' SEPTAL VELOCITY: 7.83 CM/S
ECHO LV EDV A2C: 96 ML
ECHO LV EDV A4C: 98 ML
ECHO LV EDV INDEX A4C: 66 ML/M2
ECHO LV EDV NDEX A2C: 64 ML/M2
ECHO LV EF PHYSICIAN: 60 %
ECHO LV EJECTION FRACTION A2C: 59 %
ECHO LV EJECTION FRACTION A4C: 58 %
ECHO LV EJECTION FRACTION BIPLANE: 59 % (ref 55–100)
ECHO LV ESV A2C: 39 ML
ECHO LV ESV A4C: 42 ML
ECHO LV ESV INDEX A2C: 26 ML/M2
ECHO LV ESV INDEX A4C: 28 ML/M2
ECHO LV FRACTIONAL SHORTENING: 26 % (ref 28–44)
ECHO LV INTERNAL DIMENSION DIASTOLE INDEX: 3.09 CM/M2
ECHO LV INTERNAL DIMENSION DIASTOLIC: 4.6 CM (ref 3.9–5.3)
ECHO LV INTERNAL DIMENSION SYSTOLIC INDEX: 2.28 CM/M2
ECHO LV INTERNAL DIMENSION SYSTOLIC: 3.4 CM
ECHO LV IVSD: 1 CM (ref 0.6–0.9)
ECHO LV MASS 2D: 158.8 G (ref 67–162)
ECHO LV MASS INDEX 2D: 106.6 G/M2 (ref 43–95)
ECHO LV POSTERIOR WALL DIASTOLIC: 1 CM (ref 0.6–0.9)
ECHO LV RELATIVE WALL THICKNESS RATIO: 0.43
ECHO LVOT AREA: 3.1 CM2
ECHO LVOT AV VTI INDEX: 0.66
ECHO LVOT DIAM: 2 CM
ECHO LVOT MEAN GRADIENT: 2 MMHG
ECHO LVOT PEAK GRADIENT: 3 MMHG
ECHO LVOT PEAK VELOCITY: 0.8 M/S
ECHO LVOT STROKE VOLUME INDEX: 38.8 ML/M2
ECHO LVOT SV: 57.8 ML
ECHO LVOT VTI: 18.4 CM
ECHO MV A VELOCITY: 0.74 M/S
ECHO MV AREA VTI: 2.2 CM2
ECHO MV E DECELERATION TIME (DT): 144 MS
ECHO MV E VELOCITY: 0.63 M/S
ECHO MV E/A RATIO: 0.85
ECHO MV E/E' LATERAL: 6.66
ECHO MV E/E' RATIO (AVERAGED): 7.35
ECHO MV E/E' SEPTAL: 8.05
ECHO MV LVOT VTI INDEX: 1.43
ECHO MV MAX VELOCITY: 0.8 M/S
ECHO MV MEAN GRADIENT: 1 MMHG
ECHO MV MEAN VELOCITY: 0.6 M/S
ECHO MV PEAK GRADIENT: 3 MMHG
ECHO MV REGURGITANT VTIA: 156 CM
ECHO MV VTI: 26.4 CM
ECHO PULMONARY ARTERY END DIASTOLIC PRESSURE: 5 MMHG
ECHO PV ACCELERATION TIME (AT): 127 MS
ECHO PV MAX VELOCITY: 0.8 M/S
ECHO PV PEAK GRADIENT: 3 MMHG
ECHO PV REGURGITANT END DIASTOLIC MAX VELOCITY: 1.1 M/S
ECHO RV BASAL DIMENSION: 3.1 CM
ECHO RV FREE WALL PEAK S': 12.1 CM/S
ECHO RV TAPSE: 2 CM (ref 1.7–?)
ECHO TV REGURGITANT MAX VELOCITY: 2.57 M/S
ECHO TV REGURGITANT PEAK GRADIENT: 26 MMHG
ECHO TV REGURGITANT PEAK GRADIENT: 26 MMHG
EKG ATRIAL RATE: 88 BPM
EKG DIAGNOSIS: NORMAL
EKG P AXIS: 63 DEGREES
EKG P-R INTERVAL: 138 MS
EKG Q-T INTERVAL: 364 MS
EKG QRS DURATION: 68 MS
EKG QTC CALCULATION (BAZETT): 440 MS
EKG R AXIS: 56 DEGREES
EKG T AXIS: 41 DEGREES
EKG VENTRICULAR RATE: 88 BPM
ERYTHROCYTE [DISTWIDTH] IN BLOOD BY AUTOMATED COUNT: 11.9 % (ref 11.9–14.6)
EST. AVERAGE GLUCOSE BLD GHB EST-MCNC: 143 MG/DL
GLUCOSE BLD STRIP.AUTO-MCNC: 111 MG/DL (ref 65–100)
GLUCOSE BLD STRIP.AUTO-MCNC: 121 MG/DL (ref 65–100)
GLUCOSE BLD STRIP.AUTO-MCNC: 126 MG/DL (ref 65–100)
GLUCOSE BLD STRIP.AUTO-MCNC: 130 MG/DL (ref 65–100)
GLUCOSE SERPL-MCNC: 132 MG/DL (ref 70–99)
HBA1C MFR BLD: 6.6 % (ref 0–5.6)
HCT VFR BLD AUTO: 38 % (ref 35.8–46.3)
HDLC SERPL-MCNC: 49 MG/DL (ref 40–60)
HDLC SERPL: 4.4 (ref 0–5)
HGB BLD-MCNC: 12.7 G/DL (ref 11.7–15.4)
LDLC SERPL CALC-MCNC: 148 MG/DL (ref 0–100)
MAGNESIUM SERPL-MCNC: 2 MG/DL (ref 1.8–2.4)
MCH RBC QN AUTO: 31.8 PG (ref 26.1–32.9)
MCHC RBC AUTO-ENTMCNC: 33.4 G/DL (ref 31.4–35)
MCV RBC AUTO: 95 FL (ref 82–102)
METHADONE UR QL: NEGATIVE
NRBC # BLD: 0 K/UL (ref 0–0.2)
OPIATES UR QL: NEGATIVE
PCP UR QL: NEGATIVE
PLATELET # BLD AUTO: 238 K/UL (ref 150–450)
PMV BLD AUTO: 9.8 FL (ref 9.4–12.3)
POTASSIUM SERPL-SCNC: 4.2 MMOL/L (ref 3.5–5.1)
RBC # BLD AUTO: 4 M/UL (ref 4.05–5.2)
SERVICE CMNT-IMP: ABNORMAL
SODIUM SERPL-SCNC: 140 MMOL/L (ref 136–145)
TRIGL SERPL-MCNC: 102 MG/DL (ref 0–150)
TROPONIN T SERPL HS-MCNC: 596 NG/L (ref 0–14)
UFH PPP CHRO-ACNC: 0.26 IU/ML (ref 0.3–0.7)
UFH PPP CHRO-ACNC: 0.41 IU/ML (ref 0.3–0.7)
VLDLC SERPL CALC-MCNC: 20 MG/DL (ref 6–23)
WBC # BLD AUTO: 7.9 K/UL (ref 4.3–11.1)

## 2025-04-11 PROCEDURE — C1769 GUIDE WIRE: HCPCS | Performed by: INTERNAL MEDICINE

## 2025-04-11 PROCEDURE — 6360000002 HC RX W HCPCS: Performed by: PHYSICIAN ASSISTANT

## 2025-04-11 PROCEDURE — 2500000003 HC RX 250 WO HCPCS: Performed by: NURSE PRACTITIONER

## 2025-04-11 PROCEDURE — 93880 EXTRACRANIAL BILAT STUDY: CPT | Performed by: RADIOLOGY

## 2025-04-11 PROCEDURE — 80307 DRUG TEST PRSMV CHEM ANLYZR: CPT

## 2025-04-11 PROCEDURE — C1894 INTRO/SHEATH, NON-LASER: HCPCS | Performed by: INTERNAL MEDICINE

## 2025-04-11 PROCEDURE — 93970 EXTREMITY STUDY: CPT

## 2025-04-11 PROCEDURE — 36415 COLL VENOUS BLD VENIPUNCTURE: CPT

## 2025-04-11 PROCEDURE — 6360000004 HC RX CONTRAST MEDICATION: Performed by: INTERNAL MEDICINE

## 2025-04-11 PROCEDURE — 2709999900 HC NON-CHARGEABLE SUPPLY: Performed by: INTERNAL MEDICINE

## 2025-04-11 PROCEDURE — C8929 TTE W OR WO FOL WCON,DOPPLER: HCPCS

## 2025-04-11 PROCEDURE — 83036 HEMOGLOBIN GLYCOSYLATED A1C: CPT

## 2025-04-11 PROCEDURE — 4A023N7 MEASUREMENT OF CARDIAC SAMPLING AND PRESSURE, LEFT HEART, PERCUTANEOUS APPROACH: ICD-10-PCS | Performed by: INTERNAL MEDICINE

## 2025-04-11 PROCEDURE — 93971 EXTREMITY STUDY: CPT | Performed by: RADIOLOGY

## 2025-04-11 PROCEDURE — 93458 L HRT ARTERY/VENTRICLE ANGIO: CPT | Performed by: INTERNAL MEDICINE

## 2025-04-11 PROCEDURE — 84484 ASSAY OF TROPONIN QUANT: CPT

## 2025-04-11 PROCEDURE — 2580000003 HC RX 258: Performed by: NURSE PRACTITIONER

## 2025-04-11 PROCEDURE — 6360000002 HC RX W HCPCS: Performed by: INTERNAL MEDICINE

## 2025-04-11 PROCEDURE — 83735 ASSAY OF MAGNESIUM: CPT

## 2025-04-11 PROCEDURE — 96375 TX/PRO/DX INJ NEW DRUG ADDON: CPT

## 2025-04-11 PROCEDURE — 6370000000 HC RX 637 (ALT 250 FOR IP): Performed by: NURSE PRACTITIONER

## 2025-04-11 PROCEDURE — 85520 HEPARIN ASSAY: CPT

## 2025-04-11 PROCEDURE — B2111ZZ FLUOROSCOPY OF MULTIPLE CORONARY ARTERIES USING LOW OSMOLAR CONTRAST: ICD-10-PCS | Performed by: INTERNAL MEDICINE

## 2025-04-11 PROCEDURE — 80061 LIPID PANEL: CPT

## 2025-04-11 PROCEDURE — 96366 THER/PROPH/DIAG IV INF ADDON: CPT

## 2025-04-11 PROCEDURE — 96365 THER/PROPH/DIAG IV INF INIT: CPT

## 2025-04-11 PROCEDURE — 6360000002 HC RX W HCPCS: Performed by: NURSE PRACTITIONER

## 2025-04-11 PROCEDURE — G0378 HOSPITAL OBSERVATION PER HR: HCPCS

## 2025-04-11 PROCEDURE — 2700000000 HC OXYGEN THERAPY PER DAY

## 2025-04-11 PROCEDURE — 99223 1ST HOSP IP/OBS HIGH 75: CPT | Performed by: INTERNAL MEDICINE

## 2025-04-11 PROCEDURE — 93306 TTE W/DOPPLER COMPLETE: CPT | Performed by: INTERNAL MEDICINE

## 2025-04-11 PROCEDURE — 82962 GLUCOSE BLOOD TEST: CPT

## 2025-04-11 PROCEDURE — 93010 ELECTROCARDIOGRAM REPORT: CPT | Performed by: INTERNAL MEDICINE

## 2025-04-11 PROCEDURE — 76937 US GUIDE VASCULAR ACCESS: CPT

## 2025-04-11 PROCEDURE — 80048 BASIC METABOLIC PNL TOTAL CA: CPT

## 2025-04-11 PROCEDURE — 2580000003 HC RX 258: Performed by: PHYSICIAN ASSISTANT

## 2025-04-11 PROCEDURE — 93880 EXTRACRANIAL BILAT STUDY: CPT

## 2025-04-11 PROCEDURE — 94760 N-INVAS EAR/PLS OXIMETRY 1: CPT

## 2025-04-11 PROCEDURE — 85027 COMPLETE CBC AUTOMATED: CPT

## 2025-04-11 PROCEDURE — 6360000004 HC RX CONTRAST MEDICATION: Performed by: NURSE PRACTITIONER

## 2025-04-11 PROCEDURE — 2500000003 HC RX 250 WO HCPCS: Performed by: INTERNAL MEDICINE

## 2025-04-11 PROCEDURE — 6370000000 HC RX 637 (ALT 250 FOR IP): Performed by: PHYSICIAN ASSISTANT

## 2025-04-11 RX ORDER — INSULIN LISPRO 100 [IU]/ML
0-4 INJECTION, SOLUTION INTRAVENOUS; SUBCUTANEOUS
Status: DISCONTINUED | OUTPATIENT
Start: 2025-04-11 | End: 2025-04-14

## 2025-04-11 RX ORDER — POTASSIUM CHLORIDE 7.45 MG/ML
10 INJECTION INTRAVENOUS PRN
Status: DISCONTINUED | OUTPATIENT
Start: 2025-04-11 | End: 2025-04-14

## 2025-04-11 RX ORDER — NITROGLYCERIN 0.4 MG/1
0.4 TABLET SUBLINGUAL EVERY 5 MIN PRN
Status: DISCONTINUED | OUTPATIENT
Start: 2025-04-11 | End: 2025-04-14

## 2025-04-11 RX ORDER — ONDANSETRON 2 MG/ML
4 INJECTION INTRAMUSCULAR; INTRAVENOUS EVERY 4 HOURS PRN
Status: DISCONTINUED | OUTPATIENT
Start: 2025-04-11 | End: 2025-04-14

## 2025-04-11 RX ORDER — HEPARIN SODIUM 1000 [USP'U]/ML
60 INJECTION, SOLUTION INTRAVENOUS; SUBCUTANEOUS PRN
Status: DISCONTINUED | OUTPATIENT
Start: 2025-04-11 | End: 2025-04-14

## 2025-04-11 RX ORDER — ACETAMINOPHEN 650 MG/1
650 SUPPOSITORY RECTAL EVERY 6 HOURS PRN
Status: DISCONTINUED | OUTPATIENT
Start: 2025-04-11 | End: 2025-04-14

## 2025-04-11 RX ORDER — METOPROLOL TARTRATE 25 MG/1
12.5 TABLET, FILM COATED ORAL 2 TIMES DAILY
Status: DISCONTINUED | OUTPATIENT
Start: 2025-04-12 | End: 2025-04-14 | Stop reason: HOSPADM

## 2025-04-11 RX ORDER — HEPARIN SODIUM 200 [USP'U]/100ML
INJECTION, SOLUTION INTRAVENOUS CONTINUOUS PRN
Status: DISCONTINUED | OUTPATIENT
Start: 2025-04-11 | End: 2025-04-11 | Stop reason: HOSPADM

## 2025-04-11 RX ORDER — ASPIRIN 81 MG/1
81 TABLET, CHEWABLE ORAL DAILY
Status: DISCONTINUED | OUTPATIENT
Start: 2025-04-11 | End: 2025-04-14

## 2025-04-11 RX ORDER — SODIUM CHLORIDE 0.9 % (FLUSH) 0.9 %
5-40 SYRINGE (ML) INJECTION PRN
Status: DISCONTINUED | OUTPATIENT
Start: 2025-04-11 | End: 2025-04-14

## 2025-04-11 RX ORDER — ACETAMINOPHEN 325 MG/1
650 TABLET ORAL EVERY 6 HOURS PRN
Status: DISCONTINUED | OUTPATIENT
Start: 2025-04-11 | End: 2025-04-14

## 2025-04-11 RX ORDER — POTASSIUM CHLORIDE 1500 MG/1
40 TABLET, EXTENDED RELEASE ORAL PRN
Status: DISCONTINUED | OUTPATIENT
Start: 2025-04-11 | End: 2025-04-14

## 2025-04-11 RX ORDER — SODIUM CHLORIDE 0.9 % (FLUSH) 0.9 %
5-40 SYRINGE (ML) INJECTION EVERY 12 HOURS SCHEDULED
Status: DISCONTINUED | OUTPATIENT
Start: 2025-04-11 | End: 2025-04-14

## 2025-04-11 RX ORDER — MAGNESIUM SULFATE IN WATER 40 MG/ML
2000 INJECTION, SOLUTION INTRAVENOUS PRN
Status: DISCONTINUED | OUTPATIENT
Start: 2025-04-11 | End: 2025-04-16

## 2025-04-11 RX ORDER — HEPARIN SODIUM 10000 [USP'U]/100ML
5-30 INJECTION, SOLUTION INTRAVENOUS CONTINUOUS
Status: DISCONTINUED | OUTPATIENT
Start: 2025-04-11 | End: 2025-04-14

## 2025-04-11 RX ORDER — LIDOCAINE HYDROCHLORIDE 10 MG/ML
INJECTION, SOLUTION INFILTRATION; PERINEURAL PRN
Status: DISCONTINUED | OUTPATIENT
Start: 2025-04-11 | End: 2025-04-11 | Stop reason: HOSPADM

## 2025-04-11 RX ORDER — ATORVASTATIN CALCIUM 80 MG/1
80 TABLET, FILM COATED ORAL NIGHTLY
Status: DISCONTINUED | OUTPATIENT
Start: 2025-04-11 | End: 2025-04-21 | Stop reason: HOSPADM

## 2025-04-11 RX ORDER — SODIUM CHLORIDE 9 MG/ML
INJECTION, SOLUTION INTRAVENOUS CONTINUOUS
Status: DISCONTINUED | OUTPATIENT
Start: 2025-04-11 | End: 2025-04-16

## 2025-04-11 RX ORDER — IOPAMIDOL 755 MG/ML
INJECTION, SOLUTION INTRAVASCULAR PRN
Status: DISCONTINUED | OUTPATIENT
Start: 2025-04-11 | End: 2025-04-11 | Stop reason: HOSPADM

## 2025-04-11 RX ORDER — MIDAZOLAM HYDROCHLORIDE 1 MG/ML
INJECTION, SOLUTION INTRAMUSCULAR; INTRAVENOUS PRN
Status: DISCONTINUED | OUTPATIENT
Start: 2025-04-11 | End: 2025-04-11 | Stop reason: HOSPADM

## 2025-04-11 RX ORDER — DIPHENHYDRAMINE HCL 25 MG
25 CAPSULE ORAL NIGHTLY PRN
Status: DISCONTINUED | OUTPATIENT
Start: 2025-04-11 | End: 2025-04-14

## 2025-04-11 RX ORDER — POLYETHYLENE GLYCOL 3350 17 G/17G
17 POWDER, FOR SOLUTION ORAL DAILY PRN
Status: DISCONTINUED | OUTPATIENT
Start: 2025-04-11 | End: 2025-04-14

## 2025-04-11 RX ORDER — HEPARIN SODIUM 1000 [USP'U]/ML
30 INJECTION, SOLUTION INTRAVENOUS; SUBCUTANEOUS PRN
Status: DISCONTINUED | OUTPATIENT
Start: 2025-04-11 | End: 2025-04-14

## 2025-04-11 RX ORDER — AMIODARONE HYDROCHLORIDE 200 MG/1
600 TABLET ORAL EVERY 12 HOURS
Status: COMPLETED | OUTPATIENT
Start: 2025-04-13 | End: 2025-04-14

## 2025-04-11 RX ADMIN — SODIUM CHLORIDE: 0.9 INJECTION, SOLUTION INTRAVENOUS at 18:38

## 2025-04-11 RX ADMIN — SODIUM CHLORIDE, PRESERVATIVE FREE 10 ML: 5 INJECTION INTRAVENOUS at 20:38

## 2025-04-11 RX ADMIN — SODIUM CHLORIDE, PRESERVATIVE FREE 10 ML: 5 INJECTION INTRAVENOUS at 08:46

## 2025-04-11 RX ADMIN — SULFUR HEXAFLUORIDE 5 ML: KIT at 12:32

## 2025-04-11 RX ADMIN — DIPHENHYDRAMINE HYDROCHLORIDE 25 MG: 25 CAPSULE ORAL at 01:26

## 2025-04-11 RX ADMIN — ASPIRIN 81 MG: 81 TABLET, CHEWABLE ORAL at 08:45

## 2025-04-11 RX ADMIN — SODIUM CHLORIDE 1 MG: 9 INJECTION INTRAMUSCULAR; INTRAVENOUS; SUBCUTANEOUS at 18:32

## 2025-04-11 RX ADMIN — SODIUM CHLORIDE: 0.9 INJECTION, SOLUTION INTRAVENOUS at 04:20

## 2025-04-11 RX ADMIN — HEPARIN SODIUM 1700 UNITS: 1000 INJECTION INTRAVENOUS; SUBCUTANEOUS at 06:20

## 2025-04-11 RX ADMIN — ONDANSETRON 4 MG: 2 INJECTION, SOLUTION INTRAMUSCULAR; INTRAVENOUS at 01:58

## 2025-04-11 RX ADMIN — ATORVASTATIN CALCIUM 80 MG: 80 TABLET, FILM COATED ORAL at 20:38

## 2025-04-11 ASSESSMENT — PAIN SCALES - GENERAL: PAINLEVEL_OUTOF10: 0

## 2025-04-11 NOTE — CONSULTS
Stopped by room for IV access. Cath lab RN X2 at bedside states they have a working IV and will get a second if needed.

## 2025-04-11 NOTE — ED NOTES
TRANSFER - OUT REPORT:    Verbal report given to Sp Santa RN on Deb Quijano  being transferred to [address] [place] [address] for routine progression of patient care       Report consisted of patient's Situation, Background, Assessment and   Recommendations(SBAR).     Information from the following report(s) ED SBAR was reviewed with the receiving nurse.    [place] Fall Assessment:    Presents to emergency department  because of falls (Syncope, seizure, or loss of consciousness): No  Age > 70: No  Altered Mental Status, Intoxication with alcohol or substance confusion (Disorientation, impaired judgment, poor safety awaremess, or inability to follow instructions): No  Impaired Mobility: Ambulates or transfers with assistive devices or assistance; Unable to ambulate or transer.: No  Nursing Judgement: No          Lines:       Opportunity for questions and clarification was provided.      Patient transported with:  Monitor

## 2025-04-11 NOTE — CONSULTS
MD Nba Cuba MD R. Grant Willis, MD, MS          CONSULT NOTE    Deb Quijano         4/10/2025        1965    REFERRING PHYSICIAN:  Dr. Anton      HISTORY OF PRESENT ILLNESS:  The patient is a 59 y.o. female who presented to the ER at [place] with back pain with radiation to her arms and chest. She has noted this intermittently while walking for exercise. On ER evaluation, her troponin was elevated. She admitted to recent cocaine use. She states she had not used cocaine for a long time but a friend picked her up about a week ago and they used cocaine on that occasion. She denies using any other recreational drugs.   She was transferred to [facility]. Troponins were trending up. Echocardiogram showed a normal LV EF. LHC was planned. She underwent cardiac catheterization today that showed critical LM stenosis.   Her father  in his late 40s from an MI.     Cardiac risk factors are as follows:  Family History of Premature CAD    Primary symptom for surgery:  NSTEMI  Most recent EF_55_%  Chronic  Prior cardiac arrhythmia  none      No history of stroke, TIA, prior cardiac surgery, prior vascular surgery, anesthetic complication, lung disease, DVT or PE, GI bleeding       Social History     Socioeconomic History    Marital status:      Spouse name: Not on file    Number of children: Not on file    Years of education: Not on file    Highest education level: Not on file   Occupational History    Not on file   Tobacco Use    Smoking status: Never    Smokeless tobacco: Never   Substance and Sexual Activity    Alcohol use: Yes     Comment: bottle    Drug use: Yes     Types: Cocaine    Sexual activity: Not on file   Other Topics Concern    Not on file   Social History Narrative    Not on file     Social Drivers of Health     Financial Resource Strain: Not on file   Food Insecurity: No Food Insecurity (2025)    Hunger Vital Sign     Worried About Running Out of Food in the Last

## 2025-04-11 NOTE — PERIOP NOTE
Nursing staff notified that pt on surgery schedule for CABG at 0800 Monday with planned arrival to pre-op at 0530.

## 2025-04-11 NOTE — ED TRIAGE NOTES
PT ambulatory to triage with steady gait, friend by side. PT c/o bilateral arm pain/heaviness and pain in center of chest x2 weeks intermittently. Pt reports pain comes on at night when trying to lay down. Reports no pain during day, just at night. Pt describes chest pain as \"cool\" and tightness\". PT reports SOB with episodes. PT reports chest tightness in triage. Denies cardiac hx. Hx anxiety.

## 2025-04-11 NOTE — CARE COORDINATION
Pt presented to the ED c/o back pain with radiation to bilateral arms, neck and chest. Medical hx includes: anxiety, chronic back pain, on no meds and cocaine use. PTA, pt indep with her ADLs. Lives with her mother in a one story private residence. A/Ox4. On RA. Drives. Unemployed. Denies DME needs or current HH services. Distant h/o cocaine use-UDS clean. Writer consulted PSS with [facility] for [facility] resources. No established PCP, will send referral to BS fro f/u at d/c. [employer] verified and able to afford home meds. Cards plan for [facility] with possible PCI later today. Will remain available.       04/11/25 1257   Service Assessment   Patient Orientation Alert and Oriented   Cognition Alert   History Provided By Patient   Primary Caregiver Self   Support Systems Parent;Family Members;[place];Friends/Neighbors   Patient's Healthcare Decision Maker is: Legal Next of Kin   PCP Verified by CM No   Prior Functional Level Independent in ADLs/IADLs   Current Functional Level Independent in ADLs/IADLs   Can patient return to prior living arrangement Yes   Ability to make needs known: Good   Family able to assist with home care needs: Yes   Would you like for me to discuss the discharge plan with any other family members/significant others, and if so, who? No   Financial Resources Other (Comment)  ([employer])   Community Resources None   CM/SW Referral No PCP   Social/Functional History   Lives With Parent   Type of Home House   Home Layout One level   Home Access Stairs to enter with rails   Entrance Stairs - Number of Steps 5   Entrance Stairs - Rails Both   Bathroom Toilet Standard   Bathroom Accessibility Accessible   Receives Help From Family   Prior Level of Assist for ADLs Independent   Prior Level of Assist for Homemaking Independent   Ambulation Assistance Independent   Prior Level of Assist for Transfers Independent   Active  Yes   Occupation Unemployed   Discharge Planning

## 2025-04-11 NOTE — ED PROVIDER NOTES
Emergency Department Provider Note       PCP: None, None   Age: 59 y.o.   Sex: female     DISPOSITION Decision To Transfer 04/10/2025 10:14:59 PM    ICD-10-CM    1. NSTEMI (non-ST elevated myocardial infarction) (HCC)  I21.4           Medical Decision Making     Well-appearing 59-year-old female presenting to this department with neck, back and bilateral arm discomfort as well as intermittent chest pain.  Broad-based workup initiated on my initial evaluation to include x-ray imaging of the chest, neck, EKG and some basic labs    Patient's troponin is elevated, giving her history of chest pain with radiation to both arms, discussed with cardiology her case.  They request to be transported downtown admitted to telemetry.  Will initiate heparin, give aspirin here.  X-ray imaging overall appears stable.  D-dimer within normal limit  ED Course as of 04/10/25 2302   Thu Apr 10, 2025   2015 EKG interpretation: Sinus rhythm, rate 88, normal axis, no ST elevation [BR]   2156 Initial trop 245 [BR]   2157 Chest x-ray clear [BR]      ED Course User Index  [BR] Armando Chinchilla R, DO     1 or more acute illnesses that pose a threat to life or bodily function.   1 or more chronic illnesses with a severe exacerbation or progression.  Drug therapy given requiring intensive monitoring for toxicity.  Discussion with external consultants.  Shared medical decision making was utilized in creating the patients health plan today.  I independently ordered and reviewed each unique test.    I reviewed external records: ED visit note from a different ED.   I reviewed external records: provider visit note from PCP.  I reviewed external records: provider visit note from outside specialist.  I reviewed external records: previous lab results from outside ED.   The patients assessment required an independent historian: Patient's friend.  The reason they were needed is important historical information not provided by the patient.  ED cardiac  MG/DL    ALT 16 12 - 65 U/L    AST 37 15 - 37 U/L    Alk Phosphatase 63 35 - 104 U/L    Total Protein 7.3 6.3 - 8.2 g/dL    Albumin 4.2 3.2 - 4.6 g/dL    Globulin 3.1 2.3 - 3.5 g/dL    Albumin/Globulin Ratio 1.4 1.0 - 1.9     Troponin now then q90 min for 2 occurances   Result Value Ref Range    Troponin T 245.4 (HH) 0 - 14 ng/L   Troponin now then q90 min for 2 occurances   Result Value Ref Range    Troponin T 244.9 (HH) 0 - 14 ng/L   D-Dimer, Quantitative   Result Value Ref Range    D-Dimer, Quant 0.41 <0.56 ug/ml(FEU)   Brain Natriuretic Peptide   Result Value Ref Range    NT Pro- (H) 0 - 450 PG/ML   APTT   Result Value Ref Range    APTT 21.6 (L) 23.3 - 37.4 SEC   Protime-INR   Result Value Ref Range    Protime 11.9 11.3 - 14.9 sec    INR 0.9     C-Reactive Protein   Result Value Ref Range    CRP <0.3 0.0 - 0.9 mg/dL   Sedimentation Rate   Result Value Ref Range    Sed Rate, Automated 18 0 - 30 mm/hr   EKG 12 Lead   Result Value Ref Range    Ventricular Rate 88 BPM    Atrial Rate 88 BPM    P-R Interval 138 ms    QRS Duration 68 ms    Q-T Interval 364 ms    QTc Calculation (Bazett) 440 ms    P Axis 63 degrees    R Axis 56 degrees    T Axis 41 degrees    Diagnosis       Normal sinus rhythm  Nonspecific ST abnormality  Abnormal ECG  No previous ECGs available           XR CERVICAL SPINE (2-3 VIEWS)   Final Result   No acute cervical spine fracture. Straightening of the spine suggests muscle   spasm/pain.      Soft tissue vascular calcification noted and if there is further clinical   concern, carotid ultrasound is recommended         Electronically signed by Laura Tia      XR LUMBAR SPINE (2-3 VIEWS)   Final Result   1. No acute lumbar spine fracture.   2. Loss of lumbar lordosis suggest muscle spasm and/or pain.   3. Degenerative change noted at L5-S1 level.         Electronically signed by Laura Tia      XR CHEST (2 VW)   Final Result   No acute findings in the chest         Electronically

## 2025-04-11 NOTE — H&P
[facility] Cardiology Initial Cardiac Evaluation                Date of  Admission: 4/10/2025 11:30 PM     Primary Care Physician: None  Primary Cardiologist: none  Referring Physician: Dr. Chinchilla  Supervising Physician: Dr. Anton    Reason for consult/admission: chest pain      Deb Quijano is a 59 y.o. female with past medical history of anxiety, chronic back pain on no medications as outpatient and cocaine use who presented to the ER at [facility] with complaints of back pain with radiation to bilateral arms, neck and chest. Patient denies exertional component of symptoms. States that she has been treated by chiropractor for back pain. Reports that her symptoms are only present with lying down and improve when she gets up and walks around - also takes ibuprofen. Describes her chest pain as pressure/tightness and describes tingling in bilateral arm and feels that her arms are cold.     Work-up done in the ER show EKG with diffuse ST depression. Initial troponin was elevated at 245.4. She was treated with 325mg ASA and started on IV heparin before being referred for cardiology admission at [facility].     Once on telemetry unit, patient is chest pain free. Sed rate and CRP ordered prior to transfer - ESR 18 and CRP <0.3. While reviewing history, drug use was assessed. Patient initially reported that she had used cocaine approximately a week ago after \"being talked into it\". After continued questioning, she changed her timeline to possibly 3-4 days ago once told that UDS would be checked and cocaine would show up for 72 hours. Denies tobacco abuse and admits to occasional alcohol use.     Reports her father  at 41 from presumed MI.       History reviewed. No pertinent past medical history.   No past surgical history on file.  Allergies   Allergen Reactions    Oxycodone Headaches and Nausea And Vomiting    Sulfa Antibiotics      Other Reaction(s): Unknown (comments)    Codeine Nausea And Vomiting and Other (See

## 2025-04-11 NOTE — PLAN OF CARE
Problem: Discharge Planning  Goal: Discharge to home or other facility with appropriate resources  Outcome: Progressing  Flowsheets (Taken 4/10/2025 9191)  Discharge to home or other facility with appropriate resources:   Identify barriers to discharge with patient and caregiver   Arrange for needed discharge resources and transportation as appropriate   Arrange for interpreters to assist at discharge as needed   Identify discharge learning needs (meds, wound care, etc)     Problem: Safety - Adult  Goal: Free from fall injury  Outcome: Progressing  Flowsheets (Taken 4/10/2025 2340)  Free From Fall Injury: Instruct family/caregiver on patient safety

## 2025-04-12 PROBLEM — I25.10 CAD IN NATIVE ARTERY: Status: ACTIVE | Noted: 2025-04-12

## 2025-04-12 LAB
APPEARANCE UR: CLEAR
BACTERIA URNS QL MICRO: NEGATIVE /HPF
BILIRUB UR QL: NEGATIVE
COLOR UR: ABNORMAL
EPI CELLS #/AREA URNS HPF: ABNORMAL /HPF
EST. AVERAGE GLUCOSE BLD GHB EST-MCNC: 141 MG/DL
GLUCOSE BLD STRIP.AUTO-MCNC: 122 MG/DL (ref 65–100)
GLUCOSE BLD STRIP.AUTO-MCNC: 134 MG/DL (ref 65–100)
GLUCOSE BLD STRIP.AUTO-MCNC: 142 MG/DL (ref 65–100)
GLUCOSE BLD STRIP.AUTO-MCNC: 175 MG/DL (ref 65–100)
GLUCOSE UR STRIP.AUTO-MCNC: NEGATIVE MG/DL
HBA1C MFR BLD: 6.6 % (ref 0–5.6)
HGB UR QL STRIP: NEGATIVE
HISTORY CHECK: NORMAL
HYALINE CASTS URNS QL MICRO: ABNORMAL /LPF
KETONES UR QL STRIP.AUTO: NEGATIVE MG/DL
LEUKOCYTE ESTERASE UR QL STRIP.AUTO: ABNORMAL
NITRITE UR QL STRIP.AUTO: NEGATIVE
PH UR STRIP: 6.5 (ref 5–9)
PROT UR STRIP-MCNC: NEGATIVE MG/DL
RBC #/AREA URNS HPF: ABNORMAL /HPF
SERVICE CMNT-IMP: ABNORMAL
SP GR UR REFRACTOMETRY: 1.01 (ref 1–1.02)
UFH PPP CHRO-ACNC: 0.2 IU/ML (ref 0.3–0.7)
UFH PPP CHRO-ACNC: 0.39 IU/ML (ref 0.3–0.7)
UFH PPP CHRO-ACNC: 0.51 IU/ML (ref 0.3–0.7)
UROBILINOGEN UR QL STRIP.AUTO: 1 EU/DL (ref 0.2–1)
WBC URNS QL MICRO: ABNORMAL /HPF

## 2025-04-12 PROCEDURE — 2580000003 HC RX 258: Performed by: PHYSICIAN ASSISTANT

## 2025-04-12 PROCEDURE — 2140000000 HC CCU INTERMEDIATE R&B

## 2025-04-12 PROCEDURE — 96366 THER/PROPH/DIAG IV INF ADDON: CPT

## 2025-04-12 PROCEDURE — 2580000003 HC RX 258: Performed by: NURSE PRACTITIONER

## 2025-04-12 PROCEDURE — 6360000002 HC RX W HCPCS: Performed by: NURSE PRACTITIONER

## 2025-04-12 PROCEDURE — 83036 HEMOGLOBIN GLYCOSYLATED A1C: CPT

## 2025-04-12 PROCEDURE — 81001 URINALYSIS AUTO W/SCOPE: CPT

## 2025-04-12 PROCEDURE — 85520 HEPARIN ASSAY: CPT

## 2025-04-12 PROCEDURE — 36415 COLL VENOUS BLD VENIPUNCTURE: CPT

## 2025-04-12 PROCEDURE — 6360000002 HC RX W HCPCS: Performed by: PHYSICIAN ASSISTANT

## 2025-04-12 PROCEDURE — 99232 SBSQ HOSP IP/OBS MODERATE 35: CPT | Performed by: INTERNAL MEDICINE

## 2025-04-12 PROCEDURE — 87641 MR-STAPH DNA AMP PROBE: CPT

## 2025-04-12 PROCEDURE — 2500000003 HC RX 250 WO HCPCS: Performed by: NURSE PRACTITIONER

## 2025-04-12 PROCEDURE — 86900 BLOOD TYPING SEROLOGIC ABO: CPT

## 2025-04-12 PROCEDURE — 82962 GLUCOSE BLOOD TEST: CPT

## 2025-04-12 PROCEDURE — 6370000000 HC RX 637 (ALT 250 FOR IP): Performed by: NURSE PRACTITIONER

## 2025-04-12 PROCEDURE — 86923 COMPATIBILITY TEST ELECTRIC: CPT

## 2025-04-12 PROCEDURE — 86850 RBC ANTIBODY SCREEN: CPT

## 2025-04-12 PROCEDURE — 6370000000 HC RX 637 (ALT 250 FOR IP): Performed by: PHYSICIAN ASSISTANT

## 2025-04-12 PROCEDURE — 86901 BLOOD TYPING SEROLOGIC RH(D): CPT

## 2025-04-12 RX ADMIN — ACETAMINOPHEN 650 MG: 325 TABLET ORAL at 02:10

## 2025-04-12 RX ADMIN — DIPHENHYDRAMINE HYDROCHLORIDE 25 MG: 25 CAPSULE ORAL at 00:10

## 2025-04-12 RX ADMIN — HEPARIN SODIUM 1700 UNITS: 1000 INJECTION INTRAVENOUS; SUBCUTANEOUS at 03:05

## 2025-04-12 RX ADMIN — METOPROLOL TARTRATE 12.5 MG: 25 TABLET, FILM COATED ORAL at 20:27

## 2025-04-12 RX ADMIN — ATORVASTATIN CALCIUM 80 MG: 80 TABLET, FILM COATED ORAL at 20:27

## 2025-04-12 RX ADMIN — SODIUM CHLORIDE 1 MG: 9 INJECTION INTRAMUSCULAR; INTRAVENOUS; SUBCUTANEOUS at 09:32

## 2025-04-12 RX ADMIN — ACETAMINOPHEN 650 MG: 325 TABLET ORAL at 10:14

## 2025-04-12 RX ADMIN — SODIUM CHLORIDE 1 MG: 9 INJECTION INTRAMUSCULAR; INTRAVENOUS; SUBCUTANEOUS at 20:27

## 2025-04-12 RX ADMIN — SODIUM CHLORIDE: 0.9 INJECTION, SOLUTION INTRAVENOUS at 18:43

## 2025-04-12 RX ADMIN — HEPARIN SODIUM 16 UNITS/KG/HR: 10000 INJECTION, SOLUTION INTRAVENOUS at 13:43

## 2025-04-12 RX ADMIN — SODIUM CHLORIDE, PRESERVATIVE FREE 10 ML: 5 INJECTION INTRAVENOUS at 20:28

## 2025-04-12 RX ADMIN — NITROGLYCERIN 1 INCH: 20 OINTMENT TOPICAL at 15:22

## 2025-04-12 RX ADMIN — SODIUM CHLORIDE, PRESERVATIVE FREE 10 ML: 5 INJECTION INTRAVENOUS at 09:31

## 2025-04-12 RX ADMIN — ASPIRIN 81 MG: 81 TABLET, CHEWABLE ORAL at 09:30

## 2025-04-12 ASSESSMENT — PAIN DESCRIPTION - LOCATION
LOCATION: ELBOW
LOCATION: ARM
LOCATION: CHEST

## 2025-04-12 ASSESSMENT — PAIN SCALES - GENERAL
PAINLEVEL_OUTOF10: 6
PAINLEVEL_OUTOF10: 0
PAINLEVEL_OUTOF10: 0
PAINLEVEL_OUTOF10: 7
PAINLEVEL_OUTOF10: 6
PAINLEVEL_OUTOF10: 3
PAINLEVEL_OUTOF10: 0

## 2025-04-12 ASSESSMENT — PAIN DESCRIPTION - ORIENTATION
ORIENTATION: RIGHT
ORIENTATION: RIGHT;LEFT
ORIENTATION: MID;ANTERIOR

## 2025-04-12 ASSESSMENT — PAIN DESCRIPTION - DESCRIPTORS
DESCRIPTORS: TENDER
DESCRIPTORS: ACHING;SHARP
DESCRIPTORS: TIGHTNESS

## 2025-04-12 NOTE — CARE COORDINATION
Pt underwent cardiac catheterization Friday that showed critical LM stenosis. CTS consulted and recommended urgent CABG surgery, scheduled for Monday, 4/14/25. PSS with [facility] attempted to see the pt but was ZANDRA yesterday but left his contact information for her. Pt will need a PCP arranged at discharge.

## 2025-04-13 ENCOUNTER — ANESTHESIA EVENT (OUTPATIENT)
Dept: SURGERY | Age: 60
End: 2025-04-13
Payer: COMMERCIAL

## 2025-04-13 LAB
GLUCOSE BLD STRIP.AUTO-MCNC: 125 MG/DL (ref 65–100)
GLUCOSE BLD STRIP.AUTO-MCNC: 132 MG/DL (ref 65–100)
GLUCOSE BLD STRIP.AUTO-MCNC: 160 MG/DL (ref 65–100)
GLUCOSE BLD STRIP.AUTO-MCNC: 206 MG/DL (ref 65–100)
MRSA DNA SPEC QL NAA+PROBE: NOT DETECTED
S AUREUS CPE NOSE QL NAA+PROBE: NOT DETECTED
SERVICE CMNT-IMP: ABNORMAL
UFH PPP CHRO-ACNC: 0.37 IU/ML (ref 0.3–0.7)

## 2025-04-13 PROCEDURE — 36415 COLL VENOUS BLD VENIPUNCTURE: CPT

## 2025-04-13 PROCEDURE — 82962 GLUCOSE BLOOD TEST: CPT

## 2025-04-13 PROCEDURE — 2140000000 HC CCU INTERMEDIATE R&B

## 2025-04-13 PROCEDURE — 6360000002 HC RX W HCPCS: Performed by: NURSE PRACTITIONER

## 2025-04-13 PROCEDURE — 6370000000 HC RX 637 (ALT 250 FOR IP): Performed by: NURSE PRACTITIONER

## 2025-04-13 PROCEDURE — 6370000000 HC RX 637 (ALT 250 FOR IP): Performed by: PHYSICIAN ASSISTANT

## 2025-04-13 PROCEDURE — 6370000000 HC RX 637 (ALT 250 FOR IP): Performed by: CASE MANAGER/CARE COORDINATOR

## 2025-04-13 PROCEDURE — 85520 HEPARIN ASSAY: CPT

## 2025-04-13 PROCEDURE — 2500000003 HC RX 250 WO HCPCS: Performed by: NURSE PRACTITIONER

## 2025-04-13 PROCEDURE — 99232 SBSQ HOSP IP/OBS MODERATE 35: CPT | Performed by: INTERNAL MEDICINE

## 2025-04-13 RX ORDER — FAMOTIDINE 20 MG/1
20 TABLET, FILM COATED ORAL ONCE
Status: COMPLETED | OUTPATIENT
Start: 2025-04-13 | End: 2025-04-13

## 2025-04-13 RX ADMIN — ATORVASTATIN CALCIUM 80 MG: 80 TABLET, FILM COATED ORAL at 20:36

## 2025-04-13 RX ADMIN — METOPROLOL TARTRATE 12.5 MG: 25 TABLET, FILM COATED ORAL at 20:36

## 2025-04-13 RX ADMIN — ONDANSETRON 4 MG: 2 INJECTION, SOLUTION INTRAMUSCULAR; INTRAVENOUS at 12:25

## 2025-04-13 RX ADMIN — ACETAMINOPHEN 650 MG: 325 TABLET ORAL at 10:38

## 2025-04-13 RX ADMIN — HEPARIN SODIUM 16 UNITS/KG/HR: 10000 INJECTION, SOLUTION INTRAVENOUS at 20:50

## 2025-04-13 RX ADMIN — METOPROLOL TARTRATE 12.5 MG: 25 TABLET, FILM COATED ORAL at 08:58

## 2025-04-13 RX ADMIN — ONDANSETRON 4 MG: 2 INJECTION, SOLUTION INTRAMUSCULAR; INTRAVENOUS at 04:28

## 2025-04-13 RX ADMIN — SODIUM CHLORIDE, PRESERVATIVE FREE 10 ML: 5 INJECTION INTRAVENOUS at 09:03

## 2025-04-13 RX ADMIN — FAMOTIDINE 20 MG: 20 TABLET, FILM COATED ORAL at 20:36

## 2025-04-13 RX ADMIN — SODIUM CHLORIDE, PRESERVATIVE FREE 10 ML: 5 INJECTION INTRAVENOUS at 20:37

## 2025-04-13 RX ADMIN — NITROGLYCERIN 1 INCH: 20 OINTMENT TOPICAL at 12:22

## 2025-04-13 RX ADMIN — ASPIRIN 81 MG: 81 TABLET, CHEWABLE ORAL at 08:58

## 2025-04-13 RX ADMIN — AMIODARONE HYDROCHLORIDE 600 MG: 200 TABLET ORAL at 16:39

## 2025-04-13 RX ADMIN — ONDANSETRON 4 MG: 2 INJECTION, SOLUTION INTRAMUSCULAR; INTRAVENOUS at 23:12

## 2025-04-13 ASSESSMENT — PAIN - FUNCTIONAL ASSESSMENT: PAIN_FUNCTIONAL_ASSESSMENT: PREVENTS OR INTERFERES SOME ACTIVE ACTIVITIES AND ADLS

## 2025-04-13 ASSESSMENT — PAIN SCALES - GENERAL
PAINLEVEL_OUTOF10: 3
PAINLEVEL_OUTOF10: 0
PAINLEVEL_OUTOF10: 5
PAINLEVEL_OUTOF10: 0

## 2025-04-13 ASSESSMENT — PAIN DESCRIPTION - DESCRIPTORS
DESCRIPTORS: ACHING
DESCRIPTORS: TIGHTNESS

## 2025-04-13 ASSESSMENT — PAIN DESCRIPTION - LOCATION
LOCATION: CHEST
LOCATION: HEAD

## 2025-04-13 ASSESSMENT — PAIN DESCRIPTION - ORIENTATION
ORIENTATION: MID;ANTERIOR
ORIENTATION: MID;ANTERIOR

## 2025-04-13 NOTE — ANESTHESIA PRE PROCEDURE
• Chest pain R07.9   • Elevated glucose level R73.09   • Cocaine use F14.90   • NSTEMI (non-ST elevated myocardial infarction) (HCC) I21.4   • CAD in native artery I25.10       Past Medical History:  History reviewed. No pertinent past medical history.    Past Surgical History:  No past surgical history on file.    Social History:    Social History     Tobacco Use   • Smoking status: Never   • Smokeless tobacco: Never   Substance Use Topics   • Alcohol use: Yes     Comment: bottle                                Counseling given: Not Answered      Vital Signs (Current):   Vitals:    04/12/25 2346 04/13/25 0421 04/13/25 0803 04/13/25 0845   BP: 102/66 103/67 (!) 87/61 118/75   Pulse: 72 72 69 85   Resp: 18 18 16    Temp: 97.7 °F (36.5 °C) 97.7 °F (36.5 °C) 98.8 °F (37.1 °C)    TempSrc: Oral Oral Temporal    SpO2: 96% 95% 95%    Weight:       Height:                                                  BP Readings from Last 3 Encounters:   04/13/25 118/75   04/10/25 (!) 112/98   04/05/23 115/67       NPO Status:                                                                                 BMI:   Wt Readings from Last 3 Encounters:   04/12/25 56.2 kg (124 lb)   04/10/25 55.8 kg (123 lb)   04/05/23 54.4 kg (120 lb)     Body mass index is 25.92 kg/m².    CBC:   Lab Results   Component Value Date/Time    WBC 7.9 04/11/2025 04:57 AM    RBC 4.00 04/11/2025 04:57 AM    HGB 12.7 04/11/2025 04:57 AM    HCT 38.0 04/11/2025 04:57 AM    MCV 95.0 04/11/2025 04:57 AM    RDW 11.9 04/11/2025 04:57 AM     04/11/2025 04:57 AM       CMP:   Lab Results   Component Value Date/Time     04/11/2025 04:57 AM    K 4.2 04/11/2025 04:57 AM     04/11/2025 04:57 AM    CO2 23 04/11/2025 04:57 AM    BUN 15 04/11/2025 04:57 AM    CREATININE 0.61 04/11/2025 04:57 AM    LABGLOM >90 04/11/2025 04:57 AM    LABGLOM >60 04/05/2023 12:02 PM    GLUCOSE 132 04/11/2025 04:57 AM    CALCIUM 8.9 04/11/2025 04:57 AM    BILITOT 0.3 04/10/2025

## 2025-04-14 ENCOUNTER — APPOINTMENT (OUTPATIENT)
Dept: GENERAL RADIOLOGY | Age: 60
DRG: 234 | End: 2025-04-14
Attending: INTERNAL MEDICINE
Payer: COMMERCIAL

## 2025-04-14 ENCOUNTER — ANESTHESIA (OUTPATIENT)
Dept: SURGERY | Age: 60
End: 2025-04-14
Payer: COMMERCIAL

## 2025-04-14 PROBLEM — R09.02 HYPOXEMIA: Status: ACTIVE | Noted: 2025-04-14

## 2025-04-14 PROBLEM — J98.11 ATELECTASIS: Status: ACTIVE | Noted: 2025-04-14

## 2025-04-14 PROBLEM — Z95.1 S/P CABG X 3: Status: ACTIVE | Noted: 2025-04-14

## 2025-04-14 PROBLEM — I25.10 CAD, MULTIPLE VESSEL: Status: ACTIVE | Noted: 2025-04-14

## 2025-04-14 LAB
ANION GAP BLD CALC-SCNC: ABNORMAL MMOL/L
ANION GAP SERPL CALC-SCNC: 7 MMOL/L (ref 7–16)
ANION GAP SERPL CALC-SCNC: 7 MMOL/L (ref 7–16)
ANION GAP SERPL CALC-SCNC: 9 MMOL/L (ref 7–16)
APTT PPP: 29.8 SEC (ref 23.3–37.4)
APTT PPP: 31.4 SEC (ref 23.3–37.4)
ARTERIAL PATENCY WRIST A: POSITIVE
BASE DEFICIT BLD-SCNC: 0.8 MMOL/L
BASE DEFICIT BLD-SCNC: 10.3 MMOL/L
BASE DEFICIT BLD-SCNC: 2.5 MMOL/L
BASE DEFICIT BLD-SCNC: 2.7 MMOL/L
BASE DEFICIT BLD-SCNC: 2.8 MMOL/L
BASE DEFICIT BLD-SCNC: 3.3 MMOL/L
BASE EXCESS BLD CALC-SCNC: 0 MMOL/L
BASE EXCESS BLD CALC-SCNC: 1.6 MMOL/L
BDY SITE: ABNORMAL
BDY SITE: ABNORMAL
BUN SERPL-MCNC: 11 MG/DL (ref 8–23)
BUN SERPL-MCNC: 9 MG/DL (ref 8–23)
BUN SERPL-MCNC: 9 MG/DL (ref 8–23)
CA-I BLD-MCNC: 1.08 MMOL/L (ref 1.12–1.32)
CA-I BLD-MCNC: 1.09 MMOL/L (ref 1.12–1.32)
CA-I BLD-MCNC: 1.13 MMOL/L (ref 1.12–1.32)
CA-I BLD-MCNC: 1.23 MMOL/L (ref 1.12–1.32)
CA-I BLD-MCNC: 1.23 MMOL/L (ref 1.12–1.32)
CALCIUM SERPL-MCNC: 7 MG/DL (ref 8.8–10.2)
CALCIUM SERPL-MCNC: 7.3 MG/DL (ref 8.8–10.2)
CALCIUM SERPL-MCNC: 8.8 MG/DL (ref 8.8–10.2)
CHLORIDE SERPL-SCNC: 108 MMOL/L (ref 98–107)
CHLORIDE SERPL-SCNC: 111 MMOL/L (ref 98–107)
CHLORIDE SERPL-SCNC: 113 MMOL/L (ref 98–107)
CIT FUNC FIB MAX AMP: 14.2 MM (ref 15–32)
CIT KAOLIN/HEP R-TIME: 5.5 MINS (ref 4.3–8.3)
CIT RAPIDTEG MAX AMP: 55.7 MM (ref 52–70)
CITRATED KAOLIN ANGLE: 68.8 DEG (ref 63–78)
CITRATED KAOLIN K TIME: 1.6 MINS (ref 0.8–2.1)
CITRATED KAOLIN MAX AMPLITUDE: 57.7 MM (ref 52–69)
CITRATED KAOLIN R TIME: 5.3 MINS (ref 4.6–9.1)
CO2 BLD-SCNC: 21 MMOL/L (ref 13–23)
CO2 BLD-SCNC: 22 MMOL/L (ref 13–23)
CO2 BLD-SCNC: 22 MMOL/L (ref 13–23)
CO2 BLD-SCNC: 24 MMOL/L (ref 13–23)
CO2 BLD-SCNC: 29 MMOL/L (ref 13–23)
CO2 SERPL-SCNC: 21 MMOL/L (ref 20–29)
CO2 SERPL-SCNC: 22 MMOL/L (ref 20–29)
CO2 SERPL-SCNC: 24 MMOL/L (ref 20–29)
CREAT SERPL-MCNC: 0.53 MG/DL (ref 0.6–1.1)
CREAT SERPL-MCNC: 0.62 MG/DL (ref 0.6–1.1)
CREAT SERPL-MCNC: 0.66 MG/DL (ref 0.6–1.1)
EKG ATRIAL RATE: 65 BPM
EKG DIAGNOSIS: NORMAL
EKG P AXIS: 46 DEGREES
EKG P-R INTERVAL: 138 MS
EKG Q-T INTERVAL: 436 MS
EKG QRS DURATION: 84 MS
EKG QTC CALCULATION (BAZETT): 453 MS
EKG R AXIS: 5 DEGREES
EKG T AXIS: 74 DEGREES
EKG VENTRICULAR RATE: 65 BPM
ERYTHROCYTE [DISTWIDTH] IN BLOOD BY AUTOMATED COUNT: 12.2 % (ref 11.9–14.6)
ERYTHROCYTE [DISTWIDTH] IN BLOOD BY AUTOMATED COUNT: 12.3 % (ref 11.9–14.6)
ERYTHROCYTE [DISTWIDTH] IN BLOOD BY AUTOMATED COUNT: 12.3 % (ref 11.9–14.6)
ERYTHROCYTE [DISTWIDTH] IN BLOOD BY AUTOMATED COUNT: 12.4 % (ref 11.9–14.6)
FIBRINOGEN PPP-MCNC: 252 MG/DL (ref 190–501)
FUNCT FIBRINOGEN LEVEL: 259.1 MG/DL (ref 278–581)
GAS FLOW.O2 O2 DELIVERY SYS: ABNORMAL
GAS FLOW.O2 O2 DELIVERY SYS: ABNORMAL
GLUCOSE BLD STRIP.AUTO-MCNC: 101 MG/DL (ref 65–100)
GLUCOSE BLD STRIP.AUTO-MCNC: 108 MG/DL (ref 65–100)
GLUCOSE BLD STRIP.AUTO-MCNC: 122 MG/DL (ref 65–100)
GLUCOSE BLD STRIP.AUTO-MCNC: 125 MG/DL (ref 65–100)
GLUCOSE BLD STRIP.AUTO-MCNC: 127 MG/DL (ref 65–100)
GLUCOSE BLD STRIP.AUTO-MCNC: 133 MG/DL (ref 65–100)
GLUCOSE BLD STRIP.AUTO-MCNC: 136 MG/DL (ref 65–100)
GLUCOSE BLD STRIP.AUTO-MCNC: 137 MG/DL (ref 65–100)
GLUCOSE BLD STRIP.AUTO-MCNC: 141 MG/DL (ref 65–100)
GLUCOSE BLD STRIP.AUTO-MCNC: 146 MG/DL (ref 65–100)
GLUCOSE BLD STRIP.AUTO-MCNC: 146 MG/DL (ref 65–100)
GLUCOSE BLD STRIP.AUTO-MCNC: 167 MG/DL (ref 65–100)
GLUCOSE BLD STRIP.AUTO-MCNC: 170 MG/DL (ref 65–100)
GLUCOSE BLD STRIP.AUTO-MCNC: 181 MG/DL (ref 65–100)
GLUCOSE BLD STRIP.AUTO-MCNC: 186 MG/DL (ref 65–100)
GLUCOSE BLD STRIP.AUTO-MCNC: 200 MG/DL (ref 65–100)
GLUCOSE BLD STRIP.AUTO-MCNC: 205 MG/DL (ref 65–100)
GLUCOSE BLD STRIP.AUTO-MCNC: 214 MG/DL (ref 65–100)
GLUCOSE BLD STRIP.AUTO-MCNC: 56 MG/DL (ref 65–100)
GLUCOSE SERPL-MCNC: 121 MG/DL (ref 70–99)
GLUCOSE SERPL-MCNC: 145 MG/DL (ref 70–99)
GLUCOSE SERPL-MCNC: 153 MG/DL (ref 70–99)
HCO3 BLD-SCNC: 16.1 MMOL/L (ref 21–28)
HCO3 BLD-SCNC: 22.1 MMOL/L (ref 21–28)
HCO3 BLD-SCNC: 22.8 MMOL/L (ref 21–28)
HCO3 BLD-SCNC: 22.9 MMOL/L (ref 21–28)
HCO3 BLD-SCNC: 23.2 MMOL/L (ref 21–28)
HCO3 BLD-SCNC: 24.3 MMOL/L (ref 21–28)
HCO3 BLD-SCNC: 25 MMOL/L (ref 21–28)
HCO3 BLD-SCNC: 29.2 MMOL/L (ref 21–28)
HCT VFR BLD AUTO: 18.1 % (ref 35.8–46.3)
HCT VFR BLD AUTO: 24.6 % (ref 35.8–46.3)
HCT VFR BLD AUTO: 24.8 % (ref 35.8–46.3)
HCT VFR BLD AUTO: 28.4 % (ref 35.8–46.3)
HCT VFR BLD AUTO: 33.3 % (ref 35.8–46.3)
HGB BLD-MCNC: 11.1 G/DL (ref 11.7–15.4)
HGB BLD-MCNC: 6.3 G/DL (ref 11.7–15.4)
HGB BLD-MCNC: 8.3 G/DL (ref 11.7–15.4)
HGB BLD-MCNC: 8.4 G/DL (ref 11.7–15.4)
HGB BLD-MCNC: 9.8 G/DL (ref 11.7–15.4)
INR PPP: 1.2
INR PPP: 1.3
MAGNESIUM SERPL-MCNC: 1.8 MG/DL (ref 1.8–2.4)
MAGNESIUM SERPL-MCNC: 1.8 MG/DL (ref 1.8–2.4)
MAGNESIUM SERPL-MCNC: 2 MG/DL (ref 1.8–2.4)
MAGNESIUM SERPL-MCNC: 2.4 MG/DL (ref 1.8–2.4)
MCH RBC QN AUTO: 32.1 PG (ref 26.1–32.9)
MCH RBC QN AUTO: 32.4 PG (ref 26.1–32.9)
MCH RBC QN AUTO: 32.5 PG (ref 26.1–32.9)
MCH RBC QN AUTO: 32.5 PG (ref 26.1–32.9)
MCHC RBC AUTO-ENTMCNC: 33.3 G/DL (ref 31.4–35)
MCHC RBC AUTO-ENTMCNC: 33.7 G/DL (ref 31.4–35)
MCHC RBC AUTO-ENTMCNC: 34.5 G/DL (ref 31.4–35)
MCHC RBC AUTO-ENTMCNC: 34.8 G/DL (ref 31.4–35)
MCV RBC AUTO: 93.3 FL (ref 82–102)
MCV RBC AUTO: 94 FL (ref 82–102)
MCV RBC AUTO: 96.1 FL (ref 82–102)
MCV RBC AUTO: 96.2 FL (ref 82–102)
NRBC # BLD: 0 K/UL (ref 0–0.2)
O2/TOTAL GAS SETTING VFR VENT: 40 %
O2/TOTAL GAS SETTING VFR VENT: 60 %
PCO2 BLD: 36.8 MMHG (ref 35–45)
PCO2 BLD: 37.3 MMHG (ref 35–45)
PCO2 BLD: 40.7 MMHG (ref 35–45)
PCO2 BLD: 41.5 MMHG (ref 35–45)
PCO2 BLD: 41.6 MMHG (ref 35–45)
PCO2 BLD: 44.5 MMHG (ref 35–45)
PCO2 BLD: 45 MMHG (ref 35–45)
PCO2 BLD: 62.4 MMHG (ref 35–45)
PEEP RESPIRATORY: 8 CMH2O
PH BLD: 7.25 (ref 7.35–7.45)
PH BLD: 7.28 (ref 7.35–7.45)
PH BLD: 7.31 (ref 7.35–7.45)
PH BLD: 7.33 (ref 7.35–7.45)
PH BLD: 7.35 (ref 7.35–7.45)
PH BLD: 7.38 (ref 7.35–7.45)
PH BLD: 7.38 (ref 7.35–7.45)
PH BLD: 7.39 (ref 7.35–7.45)
PLATELET # BLD AUTO: 134 K/UL (ref 150–450)
PLATELET # BLD AUTO: 151 K/UL (ref 150–450)
PLATELET # BLD AUTO: 154 K/UL (ref 150–450)
PLATELET # BLD AUTO: 175 K/UL (ref 150–450)
PMV BLD AUTO: 10 FL (ref 9.4–12.3)
PMV BLD AUTO: 10.3 FL (ref 9.4–12.3)
PMV BLD AUTO: 10.5 FL (ref 9.4–12.3)
PMV BLD AUTO: 9.9 FL (ref 9.4–12.3)
PO2 BLD: 197 MMHG (ref 75–100)
PO2 BLD: 250 MMHG (ref 75–100)
PO2 BLD: 278 MMHG (ref 75–100)
PO2 BLD: 308 MMHG (ref 75–100)
PO2 BLD: 391 MMHG (ref 75–100)
PO2 BLD: 414 MMHG (ref 75–100)
PO2 BLD: 539 MMHG (ref 75–100)
PO2 BLD: 95 MMHG (ref 75–100)
POTASSIUM BLD-SCNC: 4.1 MMOL/L (ref 3.5–5.1)
POTASSIUM BLD-SCNC: 4.2 MMOL/L (ref 3.5–5.1)
POTASSIUM BLD-SCNC: 4.9 MMOL/L (ref 3.5–5.1)
POTASSIUM BLD-SCNC: 5 MMOL/L (ref 3.5–5.1)
POTASSIUM BLD-SCNC: 5.5 MMOL/L (ref 3.5–5.1)
POTASSIUM SERPL-SCNC: 4.1 MMOL/L (ref 3.5–5.1)
POTASSIUM SERPL-SCNC: 4.2 MMOL/L (ref 3.5–5.1)
POTASSIUM SERPL-SCNC: 4.2 MMOL/L (ref 3.5–5.1)
POTASSIUM SERPL-SCNC: 4.3 MMOL/L (ref 3.5–5.1)
PRESSURE SUPPORT SETTING VENT: 12 CMH2O
PROTHROMBIN TIME: 16 SEC (ref 11.3–14.9)
PROTHROMBIN TIME: 17.5 SEC (ref 11.3–14.9)
RBC # BLD AUTO: 1.94 M/UL (ref 4.05–5.2)
RBC # BLD AUTO: 2.56 M/UL (ref 4.05–5.2)
RBC # BLD AUTO: 3.02 M/UL (ref 4.05–5.2)
RBC # BLD AUTO: 3.46 M/UL (ref 4.05–5.2)
RESPIRATORY RATE, POC: 18 (ref 5–40)
SAO2 % BLD: 100 %
SAO2 % BLD: 96.1 % (ref 94–98)
SAO2 % BLD: 99.7 % (ref 94–98)
SAO2 % BLD: 99.9 % (ref 94–98)
SERVICE CMNT-IMP: ABNORMAL
SODIUM BLD-SCNC: 137 MMOL/L (ref 136–145)
SODIUM BLD-SCNC: 140 MMOL/L (ref 136–145)
SODIUM BLD-SCNC: 141 MMOL/L (ref 136–145)
SODIUM BLD-SCNC: 142 MMOL/L (ref 136–145)
SODIUM BLD-SCNC: 143 MMOL/L (ref 136–145)
SODIUM SERPL-SCNC: 140 MMOL/L (ref 136–145)
SODIUM SERPL-SCNC: 141 MMOL/L (ref 136–145)
SODIUM SERPL-SCNC: 142 MMOL/L (ref 136–145)
SPECIMEN SITE: ABNORMAL
SPECIMEN TYPE: ABNORMAL
SPECIMEN TYPE: ABNORMAL
VENTILATION MODE VENT: ABNORMAL
VT SETTING VENT: 350 ML
WBC # BLD AUTO: 17.7 K/UL (ref 4.3–11.1)
WBC # BLD AUTO: 17.7 K/UL (ref 4.3–11.1)
WBC # BLD AUTO: 20.7 K/UL (ref 4.3–11.1)
WBC # BLD AUTO: 6.6 K/UL (ref 4.3–11.1)

## 2025-04-14 PROCEDURE — C1769 GUIDE WIRE: HCPCS | Performed by: THORACIC SURGERY (CARDIOTHORACIC VASCULAR SURGERY)

## 2025-04-14 PROCEDURE — 85027 COMPLETE CBC AUTOMATED: CPT

## 2025-04-14 PROCEDURE — 93010 ELECTROCARDIOGRAM REPORT: CPT | Performed by: INTERNAL MEDICINE

## 2025-04-14 PROCEDURE — 2709999900 HC NON-CHARGEABLE SUPPLY: Performed by: THORACIC SURGERY (CARDIOTHORACIC VASCULAR SURGERY)

## 2025-04-14 PROCEDURE — 6360000002 HC RX W HCPCS: Performed by: THORACIC SURGERY (CARDIOTHORACIC VASCULAR SURGERY)

## 2025-04-14 PROCEDURE — 82803 BLOOD GASES ANY COMBINATION: CPT

## 2025-04-14 PROCEDURE — 6360000002 HC RX W HCPCS: Performed by: PHYSICIAN ASSISTANT

## 2025-04-14 PROCEDURE — 36430 TRANSFUSION BLD/BLD COMPNT: CPT

## 2025-04-14 PROCEDURE — 02100Z9 BYPASS CORONARY ARTERY, ONE ARTERY FROM LEFT INTERNAL MAMMARY, OPEN APPROACH: ICD-10-PCS | Performed by: THORACIC SURGERY (CARDIOTHORACIC VASCULAR SURGERY)

## 2025-04-14 PROCEDURE — 37799 UNLISTED PX VASCULAR SURGERY: CPT

## 2025-04-14 PROCEDURE — 2720000010 HC SURG SUPPLY STERILE: Performed by: THORACIC SURGERY (CARDIOTHORACIC VASCULAR SURGERY)

## 2025-04-14 PROCEDURE — 021109W BYPASS CORONARY ARTERY, TWO ARTERIES FROM AORTA WITH AUTOLOGOUS VENOUS TISSUE, OPEN APPROACH: ICD-10-PCS | Performed by: THORACIC SURGERY (CARDIOTHORACIC VASCULAR SURGERY)

## 2025-04-14 PROCEDURE — 83735 ASSAY OF MAGNESIUM: CPT

## 2025-04-14 PROCEDURE — 2580000003 HC RX 258: Performed by: NURSE ANESTHETIST, CERTIFIED REGISTERED

## 2025-04-14 PROCEDURE — 85347 COAGULATION TIME ACTIVATED: CPT

## 2025-04-14 PROCEDURE — 06BQ4ZZ EXCISION OF LEFT SAPHENOUS VEIN, PERCUTANEOUS ENDOSCOPIC APPROACH: ICD-10-PCS | Performed by: THORACIC SURGERY (CARDIOTHORACIC VASCULAR SURGERY)

## 2025-04-14 PROCEDURE — 93005 ELECTROCARDIOGRAM TRACING: CPT | Performed by: THORACIC SURGERY (CARDIOTHORACIC VASCULAR SURGERY)

## 2025-04-14 PROCEDURE — 99223 1ST HOSP IP/OBS HIGH 75: CPT | Performed by: INTERNAL MEDICINE

## 2025-04-14 PROCEDURE — 85018 HEMOGLOBIN: CPT

## 2025-04-14 PROCEDURE — B24BZZ4 ULTRASONOGRAPHY OF HEART WITH AORTA, TRANSESOPHAGEAL: ICD-10-PCS | Performed by: ANESTHESIOLOGY

## 2025-04-14 PROCEDURE — 3700000000 HC ANESTHESIA ATTENDED CARE: Performed by: THORACIC SURGERY (CARDIOTHORACIC VASCULAR SURGERY)

## 2025-04-14 PROCEDURE — P9016 RBC LEUKOCYTES REDUCED: HCPCS

## 2025-04-14 PROCEDURE — 85014 HEMATOCRIT: CPT

## 2025-04-14 PROCEDURE — 94002 VENT MGMT INPAT INIT DAY: CPT

## 2025-04-14 PROCEDURE — 6370000000 HC RX 637 (ALT 250 FOR IP): Performed by: NURSE ANESTHETIST, CERTIFIED REGISTERED

## 2025-04-14 PROCEDURE — 2580000003 HC RX 258: Performed by: ANESTHESIOLOGY

## 2025-04-14 PROCEDURE — 85576 BLOOD PLATELET AGGREGATION: CPT

## 2025-04-14 PROCEDURE — P9045 ALBUMIN (HUMAN), 5%, 250 ML: HCPCS | Performed by: THORACIC SURGERY (CARDIOTHORACIC VASCULAR SURGERY)

## 2025-04-14 PROCEDURE — 6370000000 HC RX 637 (ALT 250 FOR IP): Performed by: PHYSICIAN ASSISTANT

## 2025-04-14 PROCEDURE — 85610 PROTHROMBIN TIME: CPT

## 2025-04-14 PROCEDURE — 6370000000 HC RX 637 (ALT 250 FOR IP): Performed by: NURSE PRACTITIONER

## 2025-04-14 PROCEDURE — 2580000003 HC RX 258: Performed by: THORACIC SURGERY (CARDIOTHORACIC VASCULAR SURGERY)

## 2025-04-14 PROCEDURE — 84295 ASSAY OF SERUM SODIUM: CPT

## 2025-04-14 PROCEDURE — 6360000002 HC RX W HCPCS: Performed by: SURGERY

## 2025-04-14 PROCEDURE — P9047 ALBUMIN (HUMAN), 25%, 50ML: HCPCS

## 2025-04-14 PROCEDURE — 80048 BASIC METABOLIC PNL TOTAL CA: CPT

## 2025-04-14 PROCEDURE — 2500000003 HC RX 250 WO HCPCS: Performed by: PHYSICIAN ASSISTANT

## 2025-04-14 PROCEDURE — C1729 CATH, DRAINAGE: HCPCS | Performed by: THORACIC SURGERY (CARDIOTHORACIC VASCULAR SURGERY)

## 2025-04-14 PROCEDURE — 5A1221Z PERFORMANCE OF CARDIAC OUTPUT, CONTINUOUS: ICD-10-PCS | Performed by: THORACIC SURGERY (CARDIOTHORACIC VASCULAR SURGERY)

## 2025-04-14 PROCEDURE — 6360000002 HC RX W HCPCS

## 2025-04-14 PROCEDURE — 6370000000 HC RX 637 (ALT 250 FOR IP): Performed by: ANESTHESIOLOGY

## 2025-04-14 PROCEDURE — 3700000001 HC ADD 15 MINUTES (ANESTHESIA): Performed by: THORACIC SURGERY (CARDIOTHORACIC VASCULAR SURGERY)

## 2025-04-14 PROCEDURE — 6370000000 HC RX 637 (ALT 250 FOR IP): Performed by: CASE MANAGER/CARE COORDINATOR

## 2025-04-14 PROCEDURE — 3600000018 HC SURGERY OHS ADDTL 15MIN: Performed by: THORACIC SURGERY (CARDIOTHORACIC VASCULAR SURGERY)

## 2025-04-14 PROCEDURE — 36415 COLL VENOUS BLD VENIPUNCTURE: CPT

## 2025-04-14 PROCEDURE — C1751 CATH, INF, PER/CENT/MIDLINE: HCPCS | Performed by: THORACIC SURGERY (CARDIOTHORACIC VASCULAR SURGERY)

## 2025-04-14 PROCEDURE — 2580000003 HC RX 258: Performed by: PHYSICIAN ASSISTANT

## 2025-04-14 PROCEDURE — C1713 ANCHOR/SCREW BN/BN,TIS/BN: HCPCS | Performed by: THORACIC SURGERY (CARDIOTHORACIC VASCULAR SURGERY)

## 2025-04-14 PROCEDURE — 71045 X-RAY EXAM CHEST 1 VIEW: CPT

## 2025-04-14 PROCEDURE — 82947 ASSAY GLUCOSE BLOOD QUANT: CPT

## 2025-04-14 PROCEDURE — 3600000008 HC SURGERY OHS BASE: Performed by: THORACIC SURGERY (CARDIOTHORACIC VASCULAR SURGERY)

## 2025-04-14 PROCEDURE — 82962 GLUCOSE BLOOD TEST: CPT

## 2025-04-14 PROCEDURE — 6360000002 HC RX W HCPCS: Performed by: NURSE ANESTHETIST, CERTIFIED REGISTERED

## 2025-04-14 PROCEDURE — 82330 ASSAY OF CALCIUM: CPT

## 2025-04-14 PROCEDURE — 99232 SBSQ HOSP IP/OBS MODERATE 35: CPT | Performed by: INTERNAL MEDICINE

## 2025-04-14 PROCEDURE — 2500000003 HC RX 250 WO HCPCS

## 2025-04-14 PROCEDURE — 2100000001 HC CVICU R&B

## 2025-04-14 PROCEDURE — 2580000003 HC RX 258

## 2025-04-14 PROCEDURE — 85730 THROMBOPLASTIN TIME PARTIAL: CPT

## 2025-04-14 PROCEDURE — 2500000003 HC RX 250 WO HCPCS: Performed by: THORACIC SURGERY (CARDIOTHORACIC VASCULAR SURGERY)

## 2025-04-14 PROCEDURE — 84132 ASSAY OF SERUM POTASSIUM: CPT

## 2025-04-14 PROCEDURE — P9041 ALBUMIN (HUMAN),5%, 50ML: HCPCS | Performed by: THORACIC SURGERY (CARDIOTHORACIC VASCULAR SURGERY)

## 2025-04-14 PROCEDURE — 2700000000 HC OXYGEN THERAPY PER DAY

## 2025-04-14 PROCEDURE — 30233L1 TRANSFUSION OF NONAUTOLOGOUS FRESH PLASMA INTO PERIPHERAL VEIN, PERCUTANEOUS APPROACH: ICD-10-PCS | Performed by: THORACIC SURGERY (CARDIOTHORACIC VASCULAR SURGERY)

## 2025-04-14 PROCEDURE — 85384 FIBRINOGEN ACTIVITY: CPT

## 2025-04-14 PROCEDURE — 94761 N-INVAS EAR/PLS OXIMETRY MLT: CPT

## 2025-04-14 PROCEDURE — 2500000003 HC RX 250 WO HCPCS: Performed by: NURSE ANESTHETIST, CERTIFIED REGISTERED

## 2025-04-14 PROCEDURE — P9059 PLASMA, FRZ BETWEEN 8-24HOUR: HCPCS

## 2025-04-14 RX ORDER — MIDAZOLAM HYDROCHLORIDE 2 MG/2ML
1 INJECTION, SOLUTION INTRAMUSCULAR; INTRAVENOUS
Status: DISCONTINUED | OUTPATIENT
Start: 2025-04-14 | End: 2025-04-16

## 2025-04-14 RX ORDER — LIDOCAINE HYDROCHLORIDE 20 MG/ML
INJECTION, SOLUTION EPIDURAL; INFILTRATION; INTRACAUDAL; PERINEURAL
Status: DISCONTINUED | OUTPATIENT
Start: 2025-04-14 | End: 2025-04-14 | Stop reason: SDUPTHER

## 2025-04-14 RX ORDER — FENTANYL CITRATE 0.05 MG/ML
INJECTION, SOLUTION INTRAMUSCULAR; INTRAVENOUS
Status: DISCONTINUED | OUTPATIENT
Start: 2025-04-14 | End: 2025-04-14 | Stop reason: SDUPTHER

## 2025-04-14 RX ORDER — DEXTROSE MONOHYDRATE, SODIUM CHLORIDE, AND POTASSIUM CHLORIDE 50; 1.49; 4.5 G/1000ML; G/1000ML; G/1000ML
INJECTION, SOLUTION INTRAVENOUS CONTINUOUS
Status: DISCONTINUED | OUTPATIENT
Start: 2025-04-14 | End: 2025-04-16

## 2025-04-14 RX ORDER — SODIUM CHLORIDE 9 MG/ML
INJECTION, SOLUTION INTRAVENOUS PRN
Status: DISCONTINUED | OUTPATIENT
Start: 2025-04-14 | End: 2025-04-16

## 2025-04-14 RX ORDER — ROCURONIUM BROMIDE 10 MG/ML
INJECTION, SOLUTION INTRAVENOUS
Status: DISCONTINUED | OUTPATIENT
Start: 2025-04-14 | End: 2025-04-14 | Stop reason: SDUPTHER

## 2025-04-14 RX ORDER — CALCIUM GLUCONATE 20 MG/ML
1000 INJECTION, SOLUTION INTRAVENOUS ONCE
Status: COMPLETED | OUTPATIENT
Start: 2025-04-14 | End: 2025-04-14

## 2025-04-14 RX ORDER — ONDANSETRON 2 MG/ML
INJECTION INTRAMUSCULAR; INTRAVENOUS
Status: DISCONTINUED | OUTPATIENT
Start: 2025-04-14 | End: 2025-04-14 | Stop reason: SDUPTHER

## 2025-04-14 RX ORDER — ONDANSETRON 2 MG/ML
4 INJECTION INTRAMUSCULAR; INTRAVENOUS EVERY 4 HOURS PRN
Status: DISCONTINUED | OUTPATIENT
Start: 2025-04-14 | End: 2025-04-16

## 2025-04-14 RX ORDER — NITROGLYCERIN 20 MG/100ML
0-100 INJECTION INTRAVENOUS CONTINUOUS PRN
Status: DISCONTINUED | OUTPATIENT
Start: 2025-04-14 | End: 2025-04-16

## 2025-04-14 RX ORDER — PAPAVERINE HYDROCHLORIDE 30 MG/ML
INJECTION INTRAMUSCULAR; INTRAVENOUS PRN
Status: DISCONTINUED | OUTPATIENT
Start: 2025-04-14 | End: 2025-04-14 | Stop reason: HOSPADM

## 2025-04-14 RX ORDER — SODIUM CHLORIDE 0.9 % (FLUSH) 0.9 %
5-40 SYRINGE (ML) INJECTION EVERY 12 HOURS SCHEDULED
Status: DISCONTINUED | OUTPATIENT
Start: 2025-04-14 | End: 2025-04-14 | Stop reason: HOSPADM

## 2025-04-14 RX ORDER — MIDAZOLAM HYDROCHLORIDE 2 MG/2ML
2 INJECTION, SOLUTION INTRAMUSCULAR; INTRAVENOUS
Status: DISCONTINUED | OUTPATIENT
Start: 2025-04-14 | End: 2025-04-14 | Stop reason: HOSPADM

## 2025-04-14 RX ORDER — FAMOTIDINE 20 MG/1
20 TABLET, FILM COATED ORAL 2 TIMES DAILY
Status: DISCONTINUED | OUTPATIENT
Start: 2025-04-14 | End: 2025-04-16

## 2025-04-14 RX ORDER — INSULIN LISPRO 100 [IU]/ML
0-6 INJECTION, SOLUTION INTRAVENOUS; SUBCUTANEOUS
Status: DISCONTINUED | OUTPATIENT
Start: 2025-04-15 | End: 2025-04-16

## 2025-04-14 RX ORDER — POTASSIUM CHLORIDE 29.8 MG/ML
20 INJECTION INTRAVENOUS PRN
Status: DISCONTINUED | OUTPATIENT
Start: 2025-04-14 | End: 2025-04-16

## 2025-04-14 RX ORDER — HEPARIN SODIUM 1000 [USP'U]/ML
INJECTION, SOLUTION INTRAVENOUS; SUBCUTANEOUS
Status: DISCONTINUED | OUTPATIENT
Start: 2025-04-14 | End: 2025-04-14 | Stop reason: SDUPTHER

## 2025-04-14 RX ORDER — MAGNESIUM SULFATE IN WATER 40 MG/ML
2000 INJECTION, SOLUTION INTRAVENOUS ONCE
Status: COMPLETED | OUTPATIENT
Start: 2025-04-14 | End: 2025-04-15

## 2025-04-14 RX ORDER — SODIUM CHLORIDE 0.9 % (FLUSH) 0.9 %
5-40 SYRINGE (ML) INJECTION PRN
Status: DISCONTINUED | OUTPATIENT
Start: 2025-04-14 | End: 2025-04-14 | Stop reason: HOSPADM

## 2025-04-14 RX ORDER — ACETAMINOPHEN 500 MG
1000 TABLET ORAL ONCE
Status: COMPLETED | OUTPATIENT
Start: 2025-04-14 | End: 2025-04-14

## 2025-04-14 RX ORDER — SODIUM CHLORIDE 0.9 % (FLUSH) 0.9 %
5-40 SYRINGE (ML) INJECTION PRN
Status: DISCONTINUED | OUTPATIENT
Start: 2025-04-14 | End: 2025-04-14

## 2025-04-14 RX ORDER — ASPIRIN 81 MG/1
81 TABLET ORAL DAILY
Status: DISCONTINUED | OUTPATIENT
Start: 2025-04-15 | End: 2025-04-21 | Stop reason: HOSPADM

## 2025-04-14 RX ORDER — IPRATROPIUM BROMIDE AND ALBUTEROL SULFATE 2.5; .5 MG/3ML; MG/3ML
1 SOLUTION RESPIRATORY (INHALATION)
Status: DISCONTINUED | OUTPATIENT
Start: 2025-04-14 | End: 2025-04-15 | Stop reason: HOSPADM

## 2025-04-14 RX ORDER — FENTANYL CITRATE 50 UG/ML
100 INJECTION, SOLUTION INTRAMUSCULAR; INTRAVENOUS
Status: DISCONTINUED | OUTPATIENT
Start: 2025-04-14 | End: 2025-04-14 | Stop reason: HOSPADM

## 2025-04-14 RX ORDER — FENTANYL CITRATE 50 UG/ML
50 INJECTION, SOLUTION INTRAMUSCULAR; INTRAVENOUS EVERY 5 MIN PRN
Status: DISCONTINUED | OUTPATIENT
Start: 2025-04-14 | End: 2025-04-15 | Stop reason: HOSPADM

## 2025-04-14 RX ORDER — CHLORHEXIDINE GLUCONATE ORAL RINSE 1.2 MG/ML
10 SOLUTION DENTAL 2 TIMES DAILY
Status: DISCONTINUED | OUTPATIENT
Start: 2025-04-14 | End: 2025-04-16

## 2025-04-14 RX ORDER — SODIUM CHLORIDE 0.9 % (FLUSH) 0.9 %
5-40 SYRINGE (ML) INJECTION PRN
Status: DISCONTINUED | OUTPATIENT
Start: 2025-04-14 | End: 2025-04-16

## 2025-04-14 RX ORDER — VECURONIUM BROMIDE 1 MG/ML
INJECTION, POWDER, LYOPHILIZED, FOR SOLUTION INTRAVENOUS
Status: DISCONTINUED | OUTPATIENT
Start: 2025-04-14 | End: 2025-04-14 | Stop reason: SDUPTHER

## 2025-04-14 RX ORDER — METOPROLOL TARTRATE 25 MG/1
12.5 TABLET, FILM COATED ORAL 2 TIMES DAILY
Status: DISCONTINUED | OUTPATIENT
Start: 2025-04-15 | End: 2025-04-21 | Stop reason: HOSPADM

## 2025-04-14 RX ORDER — INSULIN LISPRO 100 [IU]/ML
0-6 INJECTION, SOLUTION INTRAVENOUS; SUBCUTANEOUS NIGHTLY
Status: DISCONTINUED | OUTPATIENT
Start: 2025-04-15 | End: 2025-04-16

## 2025-04-14 RX ORDER — NOREPINEPHRINE BITARTRATE 0.02 MG/ML
.01-.2 INJECTION, SOLUTION INTRAVENOUS CONTINUOUS
Status: DISCONTINUED | OUTPATIENT
Start: 2025-04-14 | End: 2025-04-16

## 2025-04-14 RX ORDER — DEXMEDETOMIDINE HYDROCHLORIDE 4 UG/ML
.1-1.5 INJECTION, SOLUTION INTRAVENOUS CONTINUOUS
Status: DISCONTINUED | OUTPATIENT
Start: 2025-04-14 | End: 2025-04-16

## 2025-04-14 RX ORDER — LIDOCAINE HYDROCHLORIDE 10 MG/ML
1 INJECTION, SOLUTION INFILTRATION; PERINEURAL
Status: DISCONTINUED | OUTPATIENT
Start: 2025-04-14 | End: 2025-04-14 | Stop reason: HOSPADM

## 2025-04-14 RX ORDER — NITROGLYCERIN 20 MG/100ML
INJECTION INTRAVENOUS
Status: DISCONTINUED | OUTPATIENT
Start: 2025-04-14 | End: 2025-04-14 | Stop reason: SDUPTHER

## 2025-04-14 RX ORDER — KETOROLAC TROMETHAMINE 15 MG/ML
15 INJECTION, SOLUTION INTRAMUSCULAR; INTRAVENOUS EVERY 6 HOURS PRN
Status: DISCONTINUED | OUTPATIENT
Start: 2025-04-14 | End: 2025-04-16

## 2025-04-14 RX ORDER — SODIUM CHLORIDE, SODIUM LACTATE, POTASSIUM CHLORIDE, CALCIUM CHLORIDE 600; 310; 30; 20 MG/100ML; MG/100ML; MG/100ML; MG/100ML
INJECTION, SOLUTION INTRAVENOUS CONTINUOUS
Status: DISCONTINUED | OUTPATIENT
Start: 2025-04-14 | End: 2025-04-14 | Stop reason: HOSPADM

## 2025-04-14 RX ORDER — MAGNESIUM SULFATE 1 G/100ML
1000 INJECTION INTRAVENOUS PRN
Status: DISCONTINUED | OUTPATIENT
Start: 2025-04-14 | End: 2025-04-16

## 2025-04-14 RX ORDER — SODIUM CHLORIDE 0.9 % (FLUSH) 0.9 %
5-40 SYRINGE (ML) INJECTION EVERY 12 HOURS SCHEDULED
Status: DISCONTINUED | OUTPATIENT
Start: 2025-04-14 | End: 2025-04-14

## 2025-04-14 RX ORDER — PROTAMINE SULFATE 10 MG/ML
INJECTION, SOLUTION INTRAVENOUS
Status: DISCONTINUED | OUTPATIENT
Start: 2025-04-14 | End: 2025-04-14 | Stop reason: SDUPTHER

## 2025-04-14 RX ORDER — ETOMIDATE 2 MG/ML
INJECTION INTRAVENOUS
Status: DISCONTINUED | OUTPATIENT
Start: 2025-04-14 | End: 2025-04-14 | Stop reason: SDUPTHER

## 2025-04-14 RX ORDER — HALOPERIDOL 5 MG/ML
1 INJECTION INTRAMUSCULAR
Status: DISCONTINUED | OUTPATIENT
Start: 2025-04-14 | End: 2025-04-14

## 2025-04-14 RX ORDER — ALBUMIN HUMAN 50 G/1000ML
25 SOLUTION INTRAVENOUS ONCE
Status: COMPLETED | OUTPATIENT
Start: 2025-04-15 | End: 2025-04-15

## 2025-04-14 RX ORDER — ALBUMIN HUMAN 50 G/1000ML
25 SOLUTION INTRAVENOUS ONCE
Status: COMPLETED | OUTPATIENT
Start: 2025-04-14 | End: 2025-04-14

## 2025-04-14 RX ORDER — PHENYLEPHRINE HYDROCHLORIDE 10 MG/ML
INJECTION INTRAVENOUS
Status: DISCONTINUED | OUTPATIENT
Start: 2025-04-14 | End: 2025-04-14 | Stop reason: SDUPTHER

## 2025-04-14 RX ORDER — SODIUM CHLORIDE 0.9 % (FLUSH) 0.9 %
5-40 SYRINGE (ML) INJECTION EVERY 12 HOURS SCHEDULED
Status: DISCONTINUED | OUTPATIENT
Start: 2025-04-14 | End: 2025-04-16

## 2025-04-14 RX ORDER — EPHEDRINE SULFATE 5 MG/ML
INJECTION INTRAVENOUS
Status: DISCONTINUED | OUTPATIENT
Start: 2025-04-14 | End: 2025-04-14 | Stop reason: SDUPTHER

## 2025-04-14 RX ORDER — OXYCODONE AND ACETAMINOPHEN 5; 325 MG/1; MG/1
1 TABLET ORAL EVERY 4 HOURS PRN
Refills: 0 | Status: DISCONTINUED | OUTPATIENT
Start: 2025-04-14 | End: 2025-04-16

## 2025-04-14 RX ORDER — ACETAMINOPHEN 325 MG/1
650 TABLET ORAL EVERY 6 HOURS
Status: DISCONTINUED | OUTPATIENT
Start: 2025-04-14 | End: 2025-04-21 | Stop reason: HOSPADM

## 2025-04-14 RX ORDER — SODIUM CHLORIDE 9 MG/ML
INJECTION, SOLUTION INTRAVENOUS PRN
Status: DISCONTINUED | OUTPATIENT
Start: 2025-04-14 | End: 2025-04-15

## 2025-04-14 RX ORDER — AMIODARONE HYDROCHLORIDE 200 MG/1
200 TABLET ORAL 2 TIMES DAILY
Status: DISCONTINUED | OUTPATIENT
Start: 2025-04-14 | End: 2025-04-21 | Stop reason: HOSPADM

## 2025-04-14 RX ORDER — ALBUMIN HUMAN 50 G/1000ML
SOLUTION INTRAVENOUS CONTINUOUS PRN
Status: COMPLETED | OUTPATIENT
Start: 2025-04-14 | End: 2025-04-14

## 2025-04-14 RX ORDER — DEXMEDETOMIDINE HYDROCHLORIDE 4 UG/ML
INJECTION, SOLUTION INTRAVENOUS
Status: DISCONTINUED | OUTPATIENT
Start: 2025-04-14 | End: 2025-04-14 | Stop reason: SDUPTHER

## 2025-04-14 RX ORDER — MORPHINE SULFATE 4 MG/ML
3 INJECTION, SOLUTION INTRAMUSCULAR; INTRAVENOUS
Refills: 0 | Status: DISCONTINUED | OUTPATIENT
Start: 2025-04-14 | End: 2025-04-16

## 2025-04-14 RX ORDER — AMINOCAPROIC ACID 250 MG/ML
INJECTION, SOLUTION INTRAVENOUS
Status: DISCONTINUED | OUTPATIENT
Start: 2025-04-14 | End: 2025-04-14 | Stop reason: SDUPTHER

## 2025-04-14 RX ORDER — ONDANSETRON 2 MG/ML
4 INJECTION INTRAMUSCULAR; INTRAVENOUS
Status: DISCONTINUED | OUTPATIENT
Start: 2025-04-14 | End: 2025-04-14

## 2025-04-14 RX ORDER — MORPHINE SULFATE 4 MG/ML
4 INJECTION, SOLUTION INTRAMUSCULAR; INTRAVENOUS
Refills: 0 | Status: DISCONTINUED | OUTPATIENT
Start: 2025-04-14 | End: 2025-04-16

## 2025-04-14 RX ORDER — DEXTROSE MONOHYDRATE 100 MG/ML
INJECTION, SOLUTION INTRAVENOUS CONTINUOUS PRN
Status: DISCONTINUED | OUTPATIENT
Start: 2025-04-14 | End: 2025-04-16

## 2025-04-14 RX ORDER — NALOXONE HYDROCHLORIDE 0.4 MG/ML
INJECTION, SOLUTION INTRAMUSCULAR; INTRAVENOUS; SUBCUTANEOUS PRN
Status: DISCONTINUED | OUTPATIENT
Start: 2025-04-14 | End: 2025-04-15 | Stop reason: HOSPADM

## 2025-04-14 RX ORDER — MIDAZOLAM HYDROCHLORIDE 1 MG/ML
INJECTION, SOLUTION INTRAMUSCULAR; INTRAVENOUS
Status: DISCONTINUED | OUTPATIENT
Start: 2025-04-14 | End: 2025-04-14 | Stop reason: SDUPTHER

## 2025-04-14 RX ORDER — IPRATROPIUM BROMIDE AND ALBUTEROL SULFATE 2.5; .5 MG/3ML; MG/3ML
1 SOLUTION RESPIRATORY (INHALATION)
Status: DISCONTINUED | OUTPATIENT
Start: 2025-04-14 | End: 2025-04-14 | Stop reason: HOSPADM

## 2025-04-14 RX ADMIN — MAGNESIUM SULFATE HEPTAHYDRATE 2000 MG: 40 INJECTION, SOLUTION INTRAVENOUS at 22:34

## 2025-04-14 RX ADMIN — ASPIRIN 81 MG: 81 TABLET, CHEWABLE ORAL at 04:30

## 2025-04-14 RX ADMIN — PHENYLEPHRINE HYDROCHLORIDE 200 MCG: 10 INJECTION INTRAVENOUS at 08:06

## 2025-04-14 RX ADMIN — MORPHINE SULFATE 3 MG: 4 INJECTION INTRAVENOUS at 12:23

## 2025-04-14 RX ADMIN — CALCIUM GLUCONATE 1000 MG: 20 INJECTION, SOLUTION INTRAVENOUS at 21:43

## 2025-04-14 RX ADMIN — FENTANYL CITRATE 100 MCG: 0.05 INJECTION, SOLUTION INTRAMUSCULAR; INTRAVENOUS at 11:19

## 2025-04-14 RX ADMIN — ACETAMINOPHEN 1000 MG: 500 TABLET, FILM COATED ORAL at 05:42

## 2025-04-14 RX ADMIN — DEXTROSE MONOHYDRATE 125 ML: 100 INJECTION, SOLUTION INTRAVENOUS at 18:12

## 2025-04-14 RX ADMIN — FENTANYL CITRATE 450 MCG: 0.05 INJECTION, SOLUTION INTRAMUSCULAR; INTRAVENOUS at 08:00

## 2025-04-14 RX ADMIN — FAMOTIDINE 20 MG: 10 INJECTION, SOLUTION INTRAVENOUS at 17:22

## 2025-04-14 RX ADMIN — ONDANSETRON 4 MG: 2 INJECTION INTRAMUSCULAR; INTRAVENOUS at 07:36

## 2025-04-14 RX ADMIN — Medication 1 AMPULE: at 22:26

## 2025-04-14 RX ADMIN — NITROGLYCERIN 10 MCG/MIN: 20 INJECTION INTRAVENOUS at 08:25

## 2025-04-14 RX ADMIN — LIDOCAINE HYDROCHLORIDE 100 MG: 20 INJECTION, SOLUTION EPIDURAL; INFILTRATION; INTRACAUDAL; PERINEURAL at 08:00

## 2025-04-14 RX ADMIN — INSULIN HUMAN 5 UNITS/HR: 1 INJECTION, SOLUTION INTRAVENOUS at 08:35

## 2025-04-14 RX ADMIN — SODIUM CHLORIDE, SODIUM LACTATE, POTASSIUM CHLORIDE, AND CALCIUM CHLORIDE: 600; 310; 30; 20 INJECTION, SOLUTION INTRAVENOUS at 10:48

## 2025-04-14 RX ADMIN — SODIUM CHLORIDE, PRESERVATIVE FREE 40 ML: 5 INJECTION INTRAVENOUS at 22:22

## 2025-04-14 RX ADMIN — FENTANYL CITRATE 100 MCG: 0.05 INJECTION, SOLUTION INTRAMUSCULAR; INTRAVENOUS at 09:34

## 2025-04-14 RX ADMIN — AMINOCAPROIC ACID 1 G/HR: 250 INJECTION, SOLUTION INTRAVENOUS at 08:58

## 2025-04-14 RX ADMIN — EPINEPHRINE 0.02 MCG/KG/MIN: 1 INJECTION INTRAMUSCULAR; INTRAVENOUS; SUBCUTANEOUS at 10:45

## 2025-04-14 RX ADMIN — Medication 2000 MG: at 11:15

## 2025-04-14 RX ADMIN — ATORVASTATIN CALCIUM 80 MG: 80 TABLET, FILM COATED ORAL at 22:44

## 2025-04-14 RX ADMIN — INSULIN HUMAN 5 UNITS: 100 INJECTION, SOLUTION PARENTERAL at 09:40

## 2025-04-14 RX ADMIN — EPHEDRINE SULFATE 5 MG: 5 INJECTION INTRAVENOUS at 08:10

## 2025-04-14 RX ADMIN — ONDANSETRON 4 MG: 2 INJECTION, SOLUTION INTRAMUSCULAR; INTRAVENOUS at 20:33

## 2025-04-14 RX ADMIN — CEFAZOLIN 2000 MG: 10 INJECTION, POWDER, FOR SOLUTION INTRAVENOUS at 21:44

## 2025-04-14 RX ADMIN — PHENYLEPHRINE HYDROCHLORIDE 200 MCG: 10 INJECTION INTRAVENOUS at 08:00

## 2025-04-14 RX ADMIN — MIDAZOLAM 1 MG: 1 INJECTION INTRAMUSCULAR; INTRAVENOUS at 07:36

## 2025-04-14 RX ADMIN — EPHEDRINE SULFATE 5 MG: 5 INJECTION INTRAVENOUS at 08:18

## 2025-04-14 RX ADMIN — PHENYLEPHRINE HYDROCHLORIDE 200 MCG: 10 INJECTION INTRAVENOUS at 08:17

## 2025-04-14 RX ADMIN — MIDAZOLAM HYDROCHLORIDE 1 MG: 1 INJECTION, SOLUTION INTRAMUSCULAR; INTRAVENOUS at 12:55

## 2025-04-14 RX ADMIN — FENTANYL CITRATE 50 MCG: 0.05 INJECTION, SOLUTION INTRAMUSCULAR; INTRAVENOUS at 09:39

## 2025-04-14 RX ADMIN — FENTANYL CITRATE 150 MCG: 0.05 INJECTION, SOLUTION INTRAMUSCULAR; INTRAVENOUS at 11:42

## 2025-04-14 RX ADMIN — FENTANYL CITRATE 150 MCG: 0.05 INJECTION, SOLUTION INTRAMUSCULAR; INTRAVENOUS at 08:56

## 2025-04-14 RX ADMIN — VECURONIUM BROMIDE 2 MG: 1 INJECTION, POWDER, LYOPHILIZED, FOR SOLUTION INTRAVENOUS at 08:52

## 2025-04-14 RX ADMIN — ALBUMIN (HUMAN) 25 G: 12.5 INJECTION, SOLUTION INTRAVENOUS at 16:20

## 2025-04-14 RX ADMIN — Medication 3 AMPULE: at 05:48

## 2025-04-14 RX ADMIN — SODIUM CHLORIDE 0.02 MCG/KG/MIN: 0.9 INJECTION, SOLUTION INTRAVENOUS at 19:28

## 2025-04-14 RX ADMIN — SODIUM CHLORIDE: 9 INJECTION, SOLUTION INTRAVENOUS at 14:36

## 2025-04-14 RX ADMIN — HEPARIN SODIUM 25000 UNITS: 1000 INJECTION, SOLUTION INTRAVENOUS; SUBCUTANEOUS at 09:24

## 2025-04-14 RX ADMIN — ETOMIDATE 20 MG: 2 INJECTION, SOLUTION INTRAVENOUS at 08:00

## 2025-04-14 RX ADMIN — PROTAMINE SULFATE 150 MG: 10 INJECTION, SOLUTION INTRAVENOUS at 10:51

## 2025-04-14 RX ADMIN — AMINOCAPROIC ACID 10000 MG: 250 INJECTION, SOLUTION INTRAVENOUS at 08:30

## 2025-04-14 RX ADMIN — ROCURONIUM BROMIDE 50 MG: 10 INJECTION, SOLUTION INTRAVENOUS at 08:00

## 2025-04-14 RX ADMIN — MIDAZOLAM 3 MG: 1 INJECTION INTRAMUSCULAR; INTRAVENOUS at 10:22

## 2025-04-14 RX ADMIN — VECURONIUM BROMIDE 3 MG: 1 INJECTION, POWDER, LYOPHILIZED, FOR SOLUTION INTRAVENOUS at 10:22

## 2025-04-14 RX ADMIN — METOPROLOL TARTRATE 12.5 MG: 25 TABLET, FILM COATED ORAL at 04:30

## 2025-04-14 RX ADMIN — DEXMEDETOMIDINE HYDROCHLORIDE 0.5 MCG/KG/HR: 4 INJECTION, SOLUTION INTRAVENOUS at 10:42

## 2025-04-14 RX ADMIN — MORPHINE SULFATE 4 MG: 4 INJECTION INTRAVENOUS at 21:06

## 2025-04-14 RX ADMIN — SODIUM CHLORIDE 2.1 UNITS/HR: 9 INJECTION, SOLUTION INTRAVENOUS at 12:28

## 2025-04-14 RX ADMIN — CHLORHEXIDINE GLUCONATE 10 ML: 1.2 RINSE ORAL at 22:44

## 2025-04-14 RX ADMIN — Medication 2000 MG: at 08:30

## 2025-04-14 RX ADMIN — ONDANSETRON 4 MG: 2 INJECTION, SOLUTION INTRAMUSCULAR; INTRAVENOUS at 15:23

## 2025-04-14 RX ADMIN — Medication 3 AMPULE: at 04:30

## 2025-04-14 RX ADMIN — SODIUM CHLORIDE, SODIUM LACTATE, POTASSIUM CHLORIDE, AND CALCIUM CHLORIDE: 600; 310; 30; 20 INJECTION, SOLUTION INTRAVENOUS at 06:53

## 2025-04-14 RX ADMIN — MIDAZOLAM 1 MG: 1 INJECTION INTRAMUSCULAR; INTRAVENOUS at 07:40

## 2025-04-14 RX ADMIN — ALBUMIN (HUMAN) 25 G: 12.5 INJECTION, SOLUTION INTRAVENOUS at 23:50

## 2025-04-14 RX ADMIN — AMIODARONE HYDROCHLORIDE 600 MG: 200 TABLET ORAL at 04:30

## 2025-04-14 RX ADMIN — EPHEDRINE SULFATE 5 MG: 5 INJECTION INTRAVENOUS at 08:15

## 2025-04-14 RX ADMIN — VECURONIUM BROMIDE 1 MG: 1 INJECTION, POWDER, LYOPHILIZED, FOR SOLUTION INTRAVENOUS at 09:15

## 2025-04-14 ASSESSMENT — PAIN DESCRIPTION - ORIENTATION: ORIENTATION: ANTERIOR;MID

## 2025-04-14 ASSESSMENT — PAIN SCALES - GENERAL
PAINLEVEL_OUTOF10: 0
PAINLEVEL_OUTOF10: 0
PAINLEVEL_OUTOF10: 10
PAINLEVEL_OUTOF10: 0

## 2025-04-14 ASSESSMENT — PAIN DESCRIPTION - LOCATION: LOCATION: CHEST

## 2025-04-14 ASSESSMENT — PAIN SCALES - WONG BAKER
WONGBAKER_NUMERICALRESPONSE: NO HURT

## 2025-04-14 ASSESSMENT — PAIN - FUNCTIONAL ASSESSMENT: PAIN_FUNCTIONAL_ASSESSMENT: 0-10

## 2025-04-14 ASSESSMENT — PULMONARY FUNCTION TESTS: PIF_VALUE: 21

## 2025-04-14 ASSESSMENT — PAIN DESCRIPTION - DESCRIPTORS: DESCRIPTORS: ACHING;SORE;THROBBING

## 2025-04-14 NOTE — PERIOP NOTE
TRANSFER - OUT REPORT:    Verbal report given to FELICITY THRASHER on Deb Quijano  being transferred to CVICU for routine progression of patient care       Report consisted of patient's Situation, Background, Assessment and   Recommendations(SBAR).     Information from the following report(s) Nurse Handoff Report and Surgery Report was reviewed with the receiving nurse.           Lines:   CVC  04/14/25 Right (Active)       Peripheral IV Right Forearm (Active)   Site Assessment Clean, dry & intact 04/14/25 0602   Line Status Blood return noted;Flushed 04/14/25 0602   Line Care Connections checked and tightened 04/14/25 0410   Phlebitis Assessment No symptoms 04/14/25 0602   Infiltration Assessment 0 04/14/25 0602   Alcohol Cap Used Yes 04/14/25 0410   Dressing Status Clean, dry & intact 04/14/25 0602   Dressing Type Transparent 04/14/25 0410       Peripheral IV 04/11/25 Left Cephalic (Active)   Site Assessment Clean, dry & intact 04/14/25 0602   Line Status Blood return noted;Flushed 04/14/25 0602   Line Care Connections checked and tightened 04/14/25 0410   Phlebitis Assessment No symptoms 04/14/25 0602   Infiltration Assessment 0 04/14/25 0602   Alcohol Cap Used Yes 04/14/25 0410   Dressing Status Clean, dry & intact 04/14/25 0602   Dressing Type Transparent 04/14/25 0410   Dressing Intervention New 04/11/25 1541       Peripheral IV 04/13/25 Posterior;Right Forearm (Active)   Site Assessment Clean, dry & intact 04/14/25 0601   Line Status Blood return noted;Infusing 04/14/25 0601   Line Care Connections checked and tightened 04/14/25 0410   Phlebitis Assessment No symptoms 04/14/25 0601   Infiltration Assessment 0 04/14/25 0601   Alcohol Cap Used Yes 04/14/25 0410   Dressing Status Clean, dry & intact 04/14/25 0601   Dressing Type Transparent 04/14/25 0410   Dressing Intervention New 04/14/25 0054       Arterial Line 04/14/25 Right Radial (Active)       Introducer 04/14/25 (Active)        Opportunity for questions and

## 2025-04-14 NOTE — OP NOTE
[address]  [address]                            OPERATIVE REPORT      PATIENT NAME: APOLLO DAI                     : 1965  MED REC NO: [id number]                       ROOM: 102  ACCOUNT NO: [id number]                       ADMIT DATE: 04/10/2025  PROVIDER: Nba Garcia MD    DATE OF SERVICE:  2025    PREOPERATIVE DIAGNOSES:       1. Non-ST elevation myocardial infarction.     2. Multivessel atherosclerotic coronary artery disease including critical left main coronary artery disease.    POSTOPERATIVE DIAGNOSES:       1. Non-ST elevation myocardial infarction.     2. Multivessel atherosclerotic coronary artery disease including critical left main coronary artery disease.    PROCEDURES PERFORMED:          1. Endoscopic vein harvest from left leg.        2. Coronary artery bypass grafting x3 with grafts consisting.           a. Left internal mammary artery to the LAD.           b. Reversed SV graft to the OM.           c. Reversed SV graft to PDA.    SURGEON:  Nba Garcia MD    ASSISTANT:      ANESTHESIA:  Tracheal with WILLARD.    ESTIMATED BLOOD LOSS:  Minimal.    SPECIMENS REMOVED:      INTRAOPERATIVE FINDINGS:  Saphenous vein was small, 2-3 mm.  LIMA 2.5 mm.  Aorta soft, no palpable plaque.  LAD is 1.2 mm, moderate distal disease.  OM is 1.3 mm, moderate distal disease.  PDA is 1.2 mm, moderate distal disease.  Of note, all coronary arteries were significantly small.     COMPLICATIONS:  None.    All instrument and sponge counts were correct at the end of the case.    IMPLANTS:      INDICATIONS:  The patient is a 59-year-old white female who presented to the [place] ER complaining of back pain radiating into both her arms and chest.  She has noted this intermittently while walking for exercise.  She is noted to have elevated troponins consistent with an NSTEMI.  She also admitted to recent cocaine use.  She was

## 2025-04-14 NOTE — PLAN OF CARE
Problem: Discharge Planning  Goal: Discharge to home or other facility with appropriate resources  Outcome: Progressing     Problem: Safety - Adult  Goal: Free from fall injury  Outcome: Progressing     Problem: Pain  Goal: Verbalizes/displays adequate comfort level or baseline comfort level  Outcome: Progressing  Flowsheets  Taken 4/14/2025 1145 by Phuong Medina, RN  Verbalizes/displays adequate comfort level or baseline comfort level: Encourage patient to monitor pain and request assistance  Taken 4/14/2025 0430 by Tru Ramos, RN  Verbalizes/displays adequate comfort level or baseline comfort level:   Encourage patient to monitor pain and request assistance   Administer analgesics based on type and severity of pain and evaluate response   Assess pain using appropriate pain scale   Implement non-pharmacological measures as appropriate and evaluate response   Consider cultural and social influences on pain and pain management   Notify Licensed Independent Practitioner if interventions unsuccessful or patient reports new pain     Problem: Skin/Tissue Integrity  Goal: Skin integrity remains intact  Description: 1.  Monitor for areas of redness and/or skin breakdown2.  Assess vascular access sites hourly3.  Every 4-6 hours minimum:  Change oxygen saturation probe site4.  Every 4-6 hours:  If on nasal continuous positive airway pressure, respiratory therapy assess nares and determine need for appliance change or resting period  Outcome: Progressing

## 2025-04-14 NOTE — ANESTHESIA POSTPROCEDURE EVALUATION
Department of Anesthesiology  Postprocedure Note    Patient: Deb Quijano  MRN: [id number]  YOB: 1965  Date of evaluation: 4/14/2025    Procedure Summary       Date: 04/14/25 Room / Location: [facility] MAIN OR  / [facility] MAIN OR    Anesthesia Start: 0732 Anesthesia Stop: 1146    Procedures:       CORONARY ARTERY BYPASS GRAFT (CABG X 3), LIMA; ENDOSCOPIC VEIN HARVEST, LEFT GREATER SAPHENOUS VEIN (Chest)      TRANSESOPHAGEAL ECHOCARDIOGRAM (Esophagus) Diagnosis:       NSTEMI (non-ST elevated myocardial infarction) (HCC)      (NSTEMI (non-ST elevated myocardial infarction) (HCC) [I21.4])    Providers: Nba Garcia MD Responsible Provider: Iván Hernandez MD    Anesthesia Type: General ASA Status: 4            Anesthesia Type: General    Sandro Phase I: Sandro Score: 8    Sandro Phase II:      Anesthesia Post Evaluation    Patient location during evaluation: ICU  Patient participation: complete - patient cannot participate  Level of consciousness: sedated and ventilated  Airway patency: patent  Cardiovascular status: hemodynamically stable  Respiratory status: acceptable and ventilator  Comments: BP (!) 166/84   Pulse 71   Temp 97.3 °F (36.3 °C) (Bladder)   Resp 20   Ht 1.473 m (4' 10\")   Wt 57.3 kg (126 lb 6.4 oz)   SpO2 96%   BMI 26.42 kg/m²     Handoff given to Critical Care physician, time given for questions and all questions addressed.    Pain management: adequate    No notable events documented.

## 2025-04-14 NOTE — CONSULTS
Cardiovascular ICU Consult Note: 4/14/2025  Deb Quijano                                                     Admission Date: 4/10/2025     The patient's chart is reviewed and the patient is discussed with the staff.    Subjective:     Patient is seen at the request of Nba Garcia MD  for respiratory management status post cardiac surgery.  Patient had Chest pain.  Currently is sedated in CV-ICU and orally intubated receiving  mechanical ventilation. Denies smoking, denies pulmonary history. Did do cocaine several days prior to admission, but was not regularly doing cocaine for years.     We have been asked to see in the CV-ICU for mechanical ventilation management and weaning.    No current outpatient medications   Review of Systems: unable to determine due to intubated status  History reviewed. No pertinent past medical history.  Past Surgical History:   Procedure Laterality Date    CARDIAC PROCEDURE N/A 4/11/2025    Left heart cath / coronary angiography performed by Ryan Anton MD at [facility] CARDIAC CATH LAB     Social History     Socioeconomic History    Marital status:      Spouse name: Not on file    Number of children: Not on file    Years of education: Not on file    Highest education level: Not on file   Occupational History    Not on file   Tobacco Use    Smoking status: Never    Smokeless tobacco: Never   Substance and Sexual Activity    Alcohol use: Yes     Comment: bottle    Drug use: Yes     Types: Cocaine    Sexual activity: Not on file   Other Topics Concern    Not on file   Social History Narrative    Not on file     Social Drivers of Health     Financial Resource Strain: Not on file   Food Insecurity: No Food Insecurity (4/11/2025)    Hunger Vital Sign     Worried About Running Out of Food in the Last Year: Never true     Ran Out of Food in the Last Year: Never true   Transportation Needs: No Transportation Needs (4/11/2025)    PRAPARE - Transportation     Lack of

## 2025-04-14 NOTE — CARE COORDINATION
CM reviewed chart.     CM sent PCP referral for patient as requested in previous CM note. PT evaluation pending at this time.     CM will continue to follow patient for any discharge planning needs.

## 2025-04-14 NOTE — ANESTHESIA PROCEDURE NOTES
Procedure Performed: WILLARD       Start Time:  4/14/2025 8:25 AM       End Time:   4/14/2025 11:30 PM    Anesthesia Information  Performed Personally  Anesthesiologist:  Iván Hernandez MD        Preanesthesia Checklist:  Patient identified, IV assessed, risks and benefits discussed, monitors and equipment assessed, procedure being performed at surgeon's request and anesthesia consent obtained.    General Procedure Information  Diagnostic Indications for Echo:  assessment of ascending aorta, assessment of surgical repair, hemodynamic monitoring and assessment of valve function  Physician Requesting Echo: Nba Garcia MD  Location performed:  OR  Intubated  Bite block placed  Heart visualized  Probe Insertion:  Easy  Probe Type:  Mulitplane  Modalities:  2D only, pulse wave Doppler, color flow mapping, 2D, 3D and continuous wave Doppler    Echocardiographic and Doppler Measurements    Ventricles    Ventricle  Cavity Size  Cavity          Dimension  Hypertrophy  Thrombus  Global FXN  EF    RV  normal    No  No  normal      LV  normal    No  No  normal (50-70%)         Ventricular Regional Function:  1- Basal Anteroseptal:  normal  2- Basal Anterior:  normal  3- Basal Anterolateral:  normal  4- Basal Inferolateral:  normal  5- Basal Inferior:  normal  6- Basal Inferoseptal:  normal  7- Mid Anteroseptal:  normal  8- Mid Anterior:  normal  9- Mid Anterolateral:  normal  10- Mid Inferolateral:  normal  11- Mid Inferior:  normal  12- Mid Inferoseptal:  normal  13- Apical Anterior:  normal  14- Apical Lateral:  normal  15- Apical Inferior:  normal  16- Apical Septal:  normal  17- [place]:  normal        Valves     Valves  Annulus  Stenosis Measurements   Regurg  Leaflet   Morph  Leaflet   Motion Valve Comments    Aortic normal Stenosis none.            mild   normal normal       Mitral normal none             moderate normal normal    Tricuspid normal   not present         mild   normal   normal      Pulmonic normal

## 2025-04-14 NOTE — ANESTHESIA PROCEDURE NOTES
Arterial Line:    An arterial line was placed using ultrasound guidance, in the OR for the following indication(s): continuous blood pressure monitoring and blood sampling needed.    A 20 gauge (size), 1 and 3/4 inch (length), Arrow (type) catheter was placed, Seldinger technique not used, into the right radial artery, secured by tape and Tegaderm.  Anesthesia type: General    Events:  EBL < 5mL.    Additional notes:  After examining potential sites, U/S was used continuously to observe needle path entering vessel in real time, no complications were observed.  An image was printed and scanned to chart.4/14/2025 7:50 AM4/14/2025 7:57 AM  Anesthesiologist: Iván Hernandez MD  Performed: Anesthesiologist   Preanesthetic Checklist  Completed: patient identified, IV checked, site marked, risks and benefits discussed, surgical/procedural consents, equipment checked, pre-op evaluation, timeout performed, anesthesia consent given, oxygen available, monitors applied/VS acknowledged and blood product R/B/A discussed and consented

## 2025-04-14 NOTE — BRIEF OP NOTE
Brief Postoperative Note      Patient: Deb Quijano  YOB: 1965  MRN: [id number]    Date of Procedure: 4/14/2025    Pre-Op Diagnosis Codes:      * NSTEMI (non-ST elevated myocardial infarction) (HCC) [I21.4]    Post-Op Diagnosis: Same       Procedure(s):  CORONARY ARTERY BYPASS GRAFT (CABG X 3), LIMA; ENDOSCOPIC VEIN HARVEST, LEFT GREATER SAPHENOUS VEIN  TRANSESOPHAGEAL ECHOCARDIOGRAM    Surgeon(s):  Nba Smallwood MD    Assistant:  Surgical Assistant: Camryn Johnson    Anesthesia: General    Estimated Blood Loss (mL): Minimal    Complications: None    Specimens:   * No specimens in log *    Implants:  * No implants in log *      Drains:   Chest Tube Left Pleural 1 (Active)       Chest Tube  Mediastinal 2 (Active)       Urinary Catheter 04/14/25 2 Way;[place]-Temperature (Active)       Findings:  Infection Present At Time Of Surgery (PATOS) (choose all levels that have infection present):  No infection present  Other Findings: cad    Electronically signed by NBA SMALLWOOD MD on 4/14/2025 at 11:40 AM

## 2025-04-14 NOTE — ANESTHESIA PROCEDURE NOTES
Central Venous Line:    A central venous line was placed using ultrasound guidance, in the OR for the following indication(s): central venous access.4/14/2025 8:10 AM4/14/2025 8:20 AM    Sterility preparation included the following: provider used sterile gloves, gown, hat and mask, hand hygiene performed prior to central venous catheter insertion, sterile gel and probe cover used in ultrasound-guided central venous catheter insertion and maximum sterile barriers used during central venous catheter insertion.    The patient was placed in supine position.The right internal jugular vein was prepped.    The site was prepped with Chloraprep.  A 9 Fr (size), 12 (length), introducer triple lumen was placed.    During the procedure, the following specific steps were taken: target vein identified, needle advanced into vein and blood aspirated and guidewire advanced into vein.    During the procedure the patient experienced: patient tolerated procedure well with no complications and EBL < 5mL.      Outcomes: uncomplicated  Real-time US image taken/store: Yes  Anesthesia type: general..No    Additional notes:  Potential access sites were examined with ultrasound and the acceptable patent access site was selected (site noted above).  The needle path and vein access were visualized in real time using ultrasonography, and an image was recorded for permanent record.  Staffing  Performed: Anesthesiologist   Anesthesiologist: Iván Hernandez MD  Performed by: Iván Hernandez MD  Authorized by: Iván Hernandez MD    Preanesthetic Checklist  Completed: patient identified, IV checked, site marked, risks and benefits discussed, surgical/procedural consents, equipment checked, pre-op evaluation, timeout performed, anesthesia consent given, oxygen available, monitors applied/VS acknowledged, fire risk safety assessment completed and verbalized and blood product R/B/A discussed and consented

## 2025-04-15 ENCOUNTER — APPOINTMENT (OUTPATIENT)
Dept: GENERAL RADIOLOGY | Age: 60
DRG: 234 | End: 2025-04-15
Attending: INTERNAL MEDICINE
Payer: COMMERCIAL

## 2025-04-15 ENCOUNTER — ANESTHESIA EVENT (OUTPATIENT)
Dept: SURGERY | Age: 60
End: 2025-04-15
Payer: COMMERCIAL

## 2025-04-15 ENCOUNTER — ANESTHESIA (OUTPATIENT)
Dept: SURGERY | Age: 60
End: 2025-04-15
Payer: COMMERCIAL

## 2025-04-15 LAB
ANION GAP BLD CALC-SCNC: ABNORMAL MMOL/L
ANION GAP BLD CALC-SCNC: ABNORMAL MMOL/L
ANION GAP SERPL CALC-SCNC: 7 MMOL/L (ref 7–16)
ANION GAP SERPL CALC-SCNC: 8 MMOL/L (ref 7–16)
ANION GAP SERPL CALC-SCNC: 8 MMOL/L (ref 7–16)
APTT PPP: 30.4 SEC (ref 23.3–37.4)
APTT PPP: 37.4 SEC (ref 23.3–37.4)
ARTERIAL PATENCY WRIST A: POSITIVE
BASE DEFICIT BLD-SCNC: 1.8 MMOL/L
BASE DEFICIT BLD-SCNC: 4.1 MMOL/L
BASE DEFICIT BLD-SCNC: 4.3 MMOL/L
BDY SITE: ABNORMAL
BLD PROD TYP BPU: NORMAL
BLD PROD TYP BPU: NORMAL
BLOOD BANK BLOOD PRODUCT EXPIRATION DATE: NORMAL
BLOOD BANK BLOOD PRODUCT EXPIRATION DATE: NORMAL
BLOOD BANK DISPENSE STATUS: NORMAL
BLOOD BANK DISPENSE STATUS: NORMAL
BLOOD BANK ISBT PRODUCT BLOOD TYPE: 600
BLOOD BANK ISBT PRODUCT BLOOD TYPE: 600
BLOOD BANK PRODUCT CODE: NORMAL
BLOOD BANK PRODUCT CODE: NORMAL
BLOOD BANK UNIT TYPE AND RH: NORMAL
BLOOD BANK UNIT TYPE AND RH: NORMAL
BPU ID: NORMAL
BPU ID: NORMAL
BUN SERPL-MCNC: 6 MG/DL (ref 8–23)
BUN SERPL-MCNC: 7 MG/DL (ref 8–23)
BUN SERPL-MCNC: 7 MG/DL (ref 8–23)
CA-I BLD-MCNC: 1.06 MMOL/L (ref 1.12–1.32)
CA-I BLD-MCNC: 1.11 MMOL/L (ref 1.12–1.32)
CALCIUM SERPL-MCNC: 6.9 MG/DL (ref 8.8–10.2)
CALCIUM SERPL-MCNC: 7.4 MG/DL (ref 8.8–10.2)
CALCIUM SERPL-MCNC: 7.7 MG/DL (ref 8.8–10.2)
CHLORIDE SERPL-SCNC: 108 MMOL/L (ref 98–107)
CHLORIDE SERPL-SCNC: 108 MMOL/L (ref 98–107)
CHLORIDE SERPL-SCNC: 111 MMOL/L (ref 98–107)
CO2 BLD-SCNC: 19 MMOL/L (ref 13–23)
CO2 BLD-SCNC: 22 MMOL/L (ref 13–23)
CO2 SERPL-SCNC: 20 MMOL/L (ref 20–29)
CO2 SERPL-SCNC: 21 MMOL/L (ref 20–29)
CO2 SERPL-SCNC: 21 MMOL/L (ref 20–29)
CREAT SERPL-MCNC: 0.46 MG/DL (ref 0.6–1.1)
CREAT SERPL-MCNC: 0.47 MG/DL (ref 0.6–1.1)
CREAT SERPL-MCNC: 0.49 MG/DL (ref 0.6–1.1)
EKG ATRIAL RATE: 63 BPM
EKG DIAGNOSIS: NORMAL
EKG P AXIS: 26 DEGREES
EKG P-R INTERVAL: 132 MS
EKG Q-T INTERVAL: 426 MS
EKG QRS DURATION: 72 MS
EKG QTC CALCULATION (BAZETT): 435 MS
EKG R AXIS: 20 DEGREES
EKG T AXIS: 25 DEGREES
EKG VENTRICULAR RATE: 63 BPM
ERYTHROCYTE [DISTWIDTH] IN BLOOD BY AUTOMATED COUNT: 13.2 % (ref 11.9–14.6)
FIBRINOGEN PPP-MCNC: 198 MG/DL (ref 190–501)
FIBRINOGEN PPP-MCNC: 208 MG/DL (ref 190–501)
FIBRINOGEN PPP-MCNC: 350 MG/DL (ref 190–501)
GAS FLOW.O2 O2 DELIVERY SYS: ABNORMAL
GLUCOSE BLD STRIP.AUTO-MCNC: 103 MG/DL (ref 65–100)
GLUCOSE BLD STRIP.AUTO-MCNC: 104 MG/DL (ref 65–100)
GLUCOSE BLD STRIP.AUTO-MCNC: 105 MG/DL (ref 65–100)
GLUCOSE BLD STRIP.AUTO-MCNC: 111 MG/DL (ref 65–100)
GLUCOSE BLD STRIP.AUTO-MCNC: 113 MG/DL (ref 65–100)
GLUCOSE BLD STRIP.AUTO-MCNC: 113 MG/DL (ref 65–100)
GLUCOSE BLD STRIP.AUTO-MCNC: 115 MG/DL (ref 65–100)
GLUCOSE BLD STRIP.AUTO-MCNC: 116 MG/DL (ref 65–100)
GLUCOSE BLD STRIP.AUTO-MCNC: 118 MG/DL (ref 65–100)
GLUCOSE BLD STRIP.AUTO-MCNC: 118 MG/DL (ref 65–100)
GLUCOSE BLD STRIP.AUTO-MCNC: 120 MG/DL (ref 65–100)
GLUCOSE BLD STRIP.AUTO-MCNC: 123 MG/DL (ref 65–100)
GLUCOSE BLD STRIP.AUTO-MCNC: 125 MG/DL (ref 65–100)
GLUCOSE BLD STRIP.AUTO-MCNC: 129 MG/DL (ref 65–100)
GLUCOSE BLD STRIP.AUTO-MCNC: 134 MG/DL (ref 65–100)
GLUCOSE BLD STRIP.AUTO-MCNC: 86 MG/DL (ref 65–100)
GLUCOSE BLD STRIP.AUTO-MCNC: 91 MG/DL (ref 65–100)
GLUCOSE BLD STRIP.AUTO-MCNC: 94 MG/DL (ref 65–100)
GLUCOSE BLD STRIP.AUTO-MCNC: 95 MG/DL (ref 65–100)
GLUCOSE BLD STRIP.AUTO-MCNC: 98 MG/DL (ref 65–100)
GLUCOSE SERPL-MCNC: 102 MG/DL (ref 70–99)
GLUCOSE SERPL-MCNC: 120 MG/DL (ref 70–99)
GLUCOSE SERPL-MCNC: 88 MG/DL (ref 70–99)
HCO3 BLD-SCNC: 20.1 MMOL/L (ref 21–28)
HCO3 BLD-SCNC: 22.2 MMOL/L (ref 21–28)
HCO3 BLD-SCNC: 22.3 MMOL/L (ref 21–28)
HCT VFR BLD AUTO: 20.1 % (ref 35.8–46.3)
HCT VFR BLD AUTO: 21.8 % (ref 35.8–46.3)
HCT VFR BLD AUTO: 30 % (ref 35.8–46.3)
HGB BLD-MCNC: 10.5 G/DL (ref 11.7–15.4)
HGB BLD-MCNC: 7 G/DL (ref 11.7–15.4)
HGB BLD-MCNC: 7.7 G/DL (ref 11.7–15.4)
HISTORY CHECK: NORMAL
INR PPP: 1.2
INR PPP: 1.4
MAGNESIUM SERPL-MCNC: 1.9 MG/DL (ref 1.8–2.4)
MAGNESIUM SERPL-MCNC: 2.1 MG/DL (ref 1.8–2.4)
MAGNESIUM SERPL-MCNC: 2.2 MG/DL (ref 1.8–2.4)
MCH RBC QN AUTO: 31.4 PG (ref 26.1–32.9)
MCH RBC QN AUTO: 31.7 PG (ref 26.1–32.9)
MCH RBC QN AUTO: 31.8 PG (ref 26.1–32.9)
MCHC RBC AUTO-ENTMCNC: 34.8 G/DL (ref 31.4–35)
MCHC RBC AUTO-ENTMCNC: 35 G/DL (ref 31.4–35)
MCHC RBC AUTO-ENTMCNC: 35.3 G/DL (ref 31.4–35)
MCV RBC AUTO: 89.7 FL (ref 82–102)
MCV RBC AUTO: 90.1 FL (ref 82–102)
MCV RBC AUTO: 90.9 FL (ref 82–102)
NRBC # BLD: 0 K/UL (ref 0–0.2)
O2/TOTAL GAS SETTING VFR VENT: 50 %
PCO2 BLD: 33.5 MMHG (ref 35–45)
PCO2 BLD: 33.6 MMHG (ref 35–45)
PCO2 BLD: 45.1 MMHG (ref 35–45)
PH BLD: 7.3 (ref 7.35–7.45)
PH BLD: 7.39 (ref 7.35–7.45)
PH BLD: 7.43 (ref 7.35–7.45)
PLATELET # BLD AUTO: 113 K/UL (ref 150–450)
PLATELET # BLD AUTO: 83 K/UL (ref 150–450)
PLATELET # BLD AUTO: 84 K/UL (ref 150–450)
PMV BLD AUTO: 10.4 FL (ref 9.4–12.3)
PMV BLD AUTO: 10.5 FL (ref 9.4–12.3)
PMV BLD AUTO: 10.8 FL (ref 9.4–12.3)
PO2 BLD: 127 MMHG (ref 75–100)
PO2 BLD: 175 MMHG (ref 75–100)
PO2 BLD: 229 MMHG (ref 75–100)
POTASSIUM BLD-SCNC: 3.6 MMOL/L (ref 3.5–5.1)
POTASSIUM BLD-SCNC: 3.7 MMOL/L (ref 3.5–5.1)
POTASSIUM SERPL-SCNC: 3.6 MMOL/L (ref 3.5–5.1)
POTASSIUM SERPL-SCNC: 3.7 MMOL/L (ref 3.5–5.1)
POTASSIUM SERPL-SCNC: 4.1 MMOL/L (ref 3.5–5.1)
PROTHROMBIN TIME: 15.5 SEC (ref 11.3–14.9)
PROTHROMBIN TIME: 17.6 SEC (ref 11.3–14.9)
RBC # BLD AUTO: 2.23 M/UL (ref 4.05–5.2)
RBC # BLD AUTO: 2.43 M/UL (ref 4.05–5.2)
RBC # BLD AUTO: 3.3 M/UL (ref 4.05–5.2)
SAO2 % BLD: 100 %
SAO2 % BLD: 99 %
SAO2 % BLD: 99 % (ref 94–98)
SERVICE CMNT-IMP: ABNORMAL
SERVICE CMNT-IMP: NORMAL
SODIUM BLD-SCNC: 139 MMOL/L (ref 136–145)
SODIUM BLD-SCNC: 141 MMOL/L (ref 136–145)
SODIUM SERPL-SCNC: 136 MMOL/L (ref 136–145)
SODIUM SERPL-SCNC: 137 MMOL/L (ref 136–145)
SODIUM SERPL-SCNC: 138 MMOL/L (ref 136–145)
SPECIMEN SITE: ABNORMAL
SPECIMEN SITE: ABNORMAL
SPECIMEN TYPE: ABNORMAL
UNIT DIVISION: 0
UNIT DIVISION: 0
UNIT ISSUE DATE/TIME: NORMAL
UNIT ISSUE DATE/TIME: NORMAL
WBC # BLD AUTO: 11.9 K/UL (ref 4.3–11.1)
WBC # BLD AUTO: 7.1 K/UL (ref 4.3–11.1)
WBC # BLD AUTO: 9.2 K/UL (ref 4.3–11.1)

## 2025-04-15 PROCEDURE — 99232 SBSQ HOSP IP/OBS MODERATE 35: CPT | Performed by: INTERNAL MEDICINE

## 2025-04-15 PROCEDURE — 3600000008 HC SURGERY OHS BASE: Performed by: THORACIC SURGERY (CARDIOTHORACIC VASCULAR SURGERY)

## 2025-04-15 PROCEDURE — 6360000002 HC RX W HCPCS: Performed by: NURSE ANESTHETIST, CERTIFIED REGISTERED

## 2025-04-15 PROCEDURE — 37799 UNLISTED PX VASCULAR SURGERY: CPT

## 2025-04-15 PROCEDURE — 6360000002 HC RX W HCPCS

## 2025-04-15 PROCEDURE — 30233N1 TRANSFUSION OF NONAUTOLOGOUS RED BLOOD CELLS INTO PERIPHERAL VEIN, PERCUTANEOUS APPROACH: ICD-10-PCS | Performed by: THORACIC SURGERY (CARDIOTHORACIC VASCULAR SURGERY)

## 2025-04-15 PROCEDURE — 0WCC0ZZ EXTIRPATION OF MATTER FROM MEDIASTINUM, OPEN APPROACH: ICD-10-PCS | Performed by: THORACIC SURGERY (CARDIOTHORACIC VASCULAR SURGERY)

## 2025-04-15 PROCEDURE — 85610 PROTHROMBIN TIME: CPT

## 2025-04-15 PROCEDURE — 84132 ASSAY OF SERUM POTASSIUM: CPT

## 2025-04-15 PROCEDURE — 2500000003 HC RX 250 WO HCPCS: Performed by: THORACIC SURGERY (CARDIOTHORACIC VASCULAR SURGERY)

## 2025-04-15 PROCEDURE — 71045 X-RAY EXAM CHEST 1 VIEW: CPT

## 2025-04-15 PROCEDURE — 2720000010 HC SURG SUPPLY STERILE: Performed by: THORACIC SURGERY (CARDIOTHORACIC VASCULAR SURGERY)

## 2025-04-15 PROCEDURE — 2709999900 HC NON-CHARGEABLE SUPPLY: Performed by: THORACIC SURGERY (CARDIOTHORACIC VASCULAR SURGERY)

## 2025-04-15 PROCEDURE — P9012 CRYOPRECIPITATE EACH UNIT: HCPCS

## 2025-04-15 PROCEDURE — 2700000000 HC OXYGEN THERAPY PER DAY

## 2025-04-15 PROCEDURE — 6370000000 HC RX 637 (ALT 250 FOR IP): Performed by: PHYSICIAN ASSISTANT

## 2025-04-15 PROCEDURE — 82330 ASSAY OF CALCIUM: CPT

## 2025-04-15 PROCEDURE — 3700000001 HC ADD 15 MINUTES (ANESTHESIA): Performed by: THORACIC SURGERY (CARDIOTHORACIC VASCULAR SURGERY)

## 2025-04-15 PROCEDURE — 2580000003 HC RX 258: Performed by: EMERGENCY MEDICINE

## 2025-04-15 PROCEDURE — 80048 BASIC METABOLIC PNL TOTAL CA: CPT

## 2025-04-15 PROCEDURE — 82947 ASSAY GLUCOSE BLOOD QUANT: CPT

## 2025-04-15 PROCEDURE — P9035 PLATELET PHERES LEUKOREDUCED: HCPCS

## 2025-04-15 PROCEDURE — 3700000000 HC ANESTHESIA ATTENDED CARE: Performed by: THORACIC SURGERY (CARDIOTHORACIC VASCULAR SURGERY)

## 2025-04-15 PROCEDURE — 36430 TRANSFUSION BLD/BLD COMPNT: CPT

## 2025-04-15 PROCEDURE — 6360000002 HC RX W HCPCS: Performed by: THORACIC SURGERY (CARDIOTHORACIC VASCULAR SURGERY)

## 2025-04-15 PROCEDURE — 93010 ELECTROCARDIOGRAM REPORT: CPT | Performed by: INTERNAL MEDICINE

## 2025-04-15 PROCEDURE — 2580000003 HC RX 258: Performed by: PHYSICIAN ASSISTANT

## 2025-04-15 PROCEDURE — 2500000003 HC RX 250 WO HCPCS: Performed by: NURSE ANESTHETIST, CERTIFIED REGISTERED

## 2025-04-15 PROCEDURE — 6360000002 HC RX W HCPCS: Performed by: PHYSICIAN ASSISTANT

## 2025-04-15 PROCEDURE — 2580000003 HC RX 258: Performed by: THORACIC SURGERY (CARDIOTHORACIC VASCULAR SURGERY)

## 2025-04-15 PROCEDURE — 99233 SBSQ HOSP IP/OBS HIGH 50: CPT | Performed by: INTERNAL MEDICINE

## 2025-04-15 PROCEDURE — C1729 CATH, DRAINAGE: HCPCS

## 2025-04-15 PROCEDURE — 84295 ASSAY OF SERUM SODIUM: CPT

## 2025-04-15 PROCEDURE — 2580000003 HC RX 258: Performed by: NURSE ANESTHETIST, CERTIFIED REGISTERED

## 2025-04-15 PROCEDURE — 93005 ELECTROCARDIOGRAM TRACING: CPT | Performed by: PHYSICIAN ASSISTANT

## 2025-04-15 PROCEDURE — 2500000003 HC RX 250 WO HCPCS

## 2025-04-15 PROCEDURE — 85384 FIBRINOGEN ACTIVITY: CPT

## 2025-04-15 PROCEDURE — 2580000003 HC RX 258

## 2025-04-15 PROCEDURE — 2500000003 HC RX 250 WO HCPCS: Performed by: EMERGENCY MEDICINE

## 2025-04-15 PROCEDURE — 94761 N-INVAS EAR/PLS OXIMETRY MLT: CPT

## 2025-04-15 PROCEDURE — 85027 COMPLETE CBC AUTOMATED: CPT

## 2025-04-15 PROCEDURE — 82803 BLOOD GASES ANY COMBINATION: CPT

## 2025-04-15 PROCEDURE — 2100000001 HC CVICU R&B

## 2025-04-15 PROCEDURE — 3600000018 HC SURGERY OHS ADDTL 15MIN: Performed by: THORACIC SURGERY (CARDIOTHORACIC VASCULAR SURGERY)

## 2025-04-15 PROCEDURE — P9016 RBC LEUKOCYTES REDUCED: HCPCS

## 2025-04-15 PROCEDURE — 83735 ASSAY OF MAGNESIUM: CPT

## 2025-04-15 PROCEDURE — C1713 ANCHOR/SCREW BN/BN,TIS/BN: HCPCS | Performed by: THORACIC SURGERY (CARDIOTHORACIC VASCULAR SURGERY)

## 2025-04-15 PROCEDURE — 82962 GLUCOSE BLOOD TEST: CPT

## 2025-04-15 PROCEDURE — 30233M1 TRANSFUSION OF NONAUTOLOGOUS PLASMA CRYOPRECIPITATE INTO PERIPHERAL VEIN, PERCUTANEOUS APPROACH: ICD-10-PCS | Performed by: THORACIC SURGERY (CARDIOTHORACIC VASCULAR SURGERY)

## 2025-04-15 PROCEDURE — 85730 THROMBOPLASTIN TIME PARTIAL: CPT

## 2025-04-15 PROCEDURE — 2500000003 HC RX 250 WO HCPCS: Performed by: PHYSICIAN ASSISTANT

## 2025-04-15 RX ORDER — SODIUM CHLORIDE 9 MG/ML
INJECTION, SOLUTION INTRAVENOUS PRN
Status: DISCONTINUED | OUTPATIENT
Start: 2025-04-15 | End: 2025-04-15

## 2025-04-15 RX ORDER — PHENYLEPHRINE HYDROCHLORIDE 10 MG/ML
INJECTION INTRAVENOUS
Status: DISCONTINUED | OUTPATIENT
Start: 2025-04-15 | End: 2025-04-15 | Stop reason: SDUPTHER

## 2025-04-15 RX ORDER — EPHEDRINE SULFATE 5 MG/ML
INJECTION INTRAVENOUS
Status: DISCONTINUED | OUTPATIENT
Start: 2025-04-15 | End: 2025-04-15 | Stop reason: SDUPTHER

## 2025-04-15 RX ORDER — SODIUM CHLORIDE, SODIUM LACTATE, POTASSIUM CHLORIDE, CALCIUM CHLORIDE 600; 310; 30; 20 MG/100ML; MG/100ML; MG/100ML; MG/100ML
INJECTION, SOLUTION INTRAVENOUS
Status: DISCONTINUED | OUTPATIENT
Start: 2025-04-15 | End: 2025-04-15 | Stop reason: SDUPTHER

## 2025-04-15 RX ORDER — SUCCINYLCHOLINE CHLORIDE 20 MG/ML
INJECTION INTRAMUSCULAR; INTRAVENOUS
Status: DISCONTINUED | OUTPATIENT
Start: 2025-04-15 | End: 2025-04-15 | Stop reason: SDUPTHER

## 2025-04-15 RX ORDER — SODIUM CHLORIDE 9 MG/ML
INJECTION, SOLUTION INTRAVENOUS PRN
Status: DISCONTINUED | OUTPATIENT
Start: 2025-04-15 | End: 2025-04-16

## 2025-04-15 RX ORDER — ROCURONIUM BROMIDE 10 MG/ML
INJECTION, SOLUTION INTRAVENOUS
Status: DISCONTINUED | OUTPATIENT
Start: 2025-04-15 | End: 2025-04-15 | Stop reason: SDUPTHER

## 2025-04-15 RX ORDER — NICARDIPINE HYDROCHLORIDE 0.1 MG/ML
INJECTION INTRAVENOUS
Status: DISCONTINUED | OUTPATIENT
Start: 2025-04-15 | End: 2025-04-15 | Stop reason: SDUPTHER

## 2025-04-15 RX ORDER — LIDOCAINE HYDROCHLORIDE 20 MG/ML
INJECTION, SOLUTION EPIDURAL; INFILTRATION; INTRACAUDAL; PERINEURAL
Status: DISCONTINUED | OUTPATIENT
Start: 2025-04-15 | End: 2025-04-15 | Stop reason: SDUPTHER

## 2025-04-15 RX ORDER — FENTANYL CITRATE 50 UG/ML
INJECTION, SOLUTION INTRAMUSCULAR; INTRAVENOUS
Status: DISCONTINUED | OUTPATIENT
Start: 2025-04-15 | End: 2025-04-15 | Stop reason: SDUPTHER

## 2025-04-15 RX ORDER — ETOMIDATE 2 MG/ML
INJECTION INTRAVENOUS
Status: DISCONTINUED | OUTPATIENT
Start: 2025-04-15 | End: 2025-04-15 | Stop reason: SDUPTHER

## 2025-04-15 RX ORDER — CEFAZOLIN SODIUM 1 G/3ML
INJECTION, POWDER, FOR SOLUTION INTRAMUSCULAR; INTRAVENOUS
Status: DISCONTINUED | OUTPATIENT
Start: 2025-04-15 | End: 2025-04-15 | Stop reason: SDUPTHER

## 2025-04-15 RX ORDER — NITROGLYCERIN 20 MG/100ML
INJECTION INTRAVENOUS
Status: DISCONTINUED | OUTPATIENT
Start: 2025-04-15 | End: 2025-04-15 | Stop reason: SDUPTHER

## 2025-04-15 RX ORDER — MIDAZOLAM HYDROCHLORIDE 1 MG/ML
INJECTION, SOLUTION INTRAMUSCULAR; INTRAVENOUS
Status: DISCONTINUED | OUTPATIENT
Start: 2025-04-15 | End: 2025-04-15 | Stop reason: SDUPTHER

## 2025-04-15 RX ADMIN — Medication 80 MG: at 13:10

## 2025-04-15 RX ADMIN — PHENYLEPHRINE HYDROCHLORIDE 200 MCG: 10 INJECTION INTRAVENOUS at 13:20

## 2025-04-15 RX ADMIN — NICARDIPINE HYDROCHLORIDE 0.2 MG: 0.1 INJECTION INTRAVENOUS at 14:40

## 2025-04-15 RX ADMIN — PHENYLEPHRINE HYDROCHLORIDE 100 MCG: 10 INJECTION INTRAVENOUS at 13:14

## 2025-04-15 RX ADMIN — PHENYLEPHRINE HYDROCHLORIDE 200 MCG: 10 INJECTION INTRAVENOUS at 13:26

## 2025-04-15 RX ADMIN — NICARDIPINE HYDROCHLORIDE 0.2 MG: 0.1 INJECTION INTRAVENOUS at 14:32

## 2025-04-15 RX ADMIN — POTASSIUM CHLORIDE 20 MEQ: 29.8 INJECTION, SOLUTION INTRAVENOUS at 04:35

## 2025-04-15 RX ADMIN — LIDOCAINE HYDROCHLORIDE 50 MG: 20 INJECTION, SOLUTION EPIDURAL; INFILTRATION; INTRACAUDAL; PERINEURAL at 13:10

## 2025-04-15 RX ADMIN — SODIUM CHLORIDE, PRESERVATIVE FREE 30 ML: 5 INJECTION INTRAVENOUS at 21:00

## 2025-04-15 RX ADMIN — PHENYLEPHRINE HYDROCHLORIDE 200 MCG: 10 INJECTION INTRAVENOUS at 13:34

## 2025-04-15 RX ADMIN — MORPHINE SULFATE 4 MG: 4 INJECTION INTRAVENOUS at 14:43

## 2025-04-15 RX ADMIN — ONDANSETRON 4 MG: 2 INJECTION, SOLUTION INTRAMUSCULAR; INTRAVENOUS at 22:07

## 2025-04-15 RX ADMIN — EPHEDRINE SULFATE 10 MG: 5 INJECTION INTRAVENOUS at 13:34

## 2025-04-15 RX ADMIN — CEFAZOLIN 2000 MG: 10 INJECTION, POWDER, FOR SOLUTION INTRAVENOUS at 03:40

## 2025-04-15 RX ADMIN — FAMOTIDINE 20 MG: 20 TABLET, FILM COATED ORAL at 22:02

## 2025-04-15 RX ADMIN — MAGNESIUM SULFATE HEPTAHYDRATE 1000 MG: 1 INJECTION, SOLUTION INTRAVENOUS at 18:06

## 2025-04-15 RX ADMIN — EPHEDRINE SULFATE 5 MG: 5 INJECTION INTRAVENOUS at 13:30

## 2025-04-15 RX ADMIN — FAMOTIDINE 20 MG: 20 TABLET, FILM COATED ORAL at 09:12

## 2025-04-15 RX ADMIN — NITROGLYCERIN 50 MCG: 20 INJECTION INTRAVENOUS at 14:27

## 2025-04-15 RX ADMIN — OXYCODONE HYDROCHLORIDE AND ACETAMINOPHEN 1 TABLET: 5; 325 TABLET ORAL at 11:35

## 2025-04-15 RX ADMIN — FENTANYL CITRATE 100 MCG: 50 INJECTION, SOLUTION INTRAMUSCULAR; INTRAVENOUS at 13:36

## 2025-04-15 RX ADMIN — MIDAZOLAM 2 MG: 1 INJECTION INTRAMUSCULAR; INTRAVENOUS at 13:05

## 2025-04-15 RX ADMIN — OXYCODONE HYDROCHLORIDE AND ACETAMINOPHEN 1 TABLET: 5; 325 TABLET ORAL at 05:45

## 2025-04-15 RX ADMIN — CEFAZOLIN 2 G: 1 INJECTION, POWDER, FOR SOLUTION INTRAMUSCULAR; INTRAVENOUS at 13:25

## 2025-04-15 RX ADMIN — SODIUM CHLORIDE 5 MG/HR: 0.9 INJECTION, SOLUTION INTRAVENOUS at 15:00

## 2025-04-15 RX ADMIN — SODIUM BICARBONATE: 84 INJECTION, SOLUTION INTRAVENOUS at 00:23

## 2025-04-15 RX ADMIN — NITROGLYCERIN 80 MCG/MIN: 20 INJECTION INTRAVENOUS at 14:26

## 2025-04-15 RX ADMIN — OXYCODONE HYDROCHLORIDE AND ACETAMINOPHEN 1 TABLET: 5; 325 TABLET ORAL at 20:40

## 2025-04-15 RX ADMIN — TUBERCULIN PURIFIED PROTEIN DERIVATIVE 5 UNITS: 5 INJECTION INTRADERMAL at 03:39

## 2025-04-15 RX ADMIN — ONDANSETRON 4 MG: 2 INJECTION, SOLUTION INTRAMUSCULAR; INTRAVENOUS at 01:30

## 2025-04-15 RX ADMIN — SODIUM CHLORIDE, SODIUM LACTATE, POTASSIUM CHLORIDE, AND CALCIUM CHLORIDE: 600; 310; 30; 20 INJECTION, SOLUTION INTRAVENOUS at 12:56

## 2025-04-15 RX ADMIN — ROCURONIUM BROMIDE 5 MG: 10 INJECTION, SOLUTION INTRAVENOUS at 13:10

## 2025-04-15 RX ADMIN — NITROGLYCERIN 50 MCG: 20 INJECTION INTRAVENOUS at 14:30

## 2025-04-15 RX ADMIN — ACETAMINOPHEN 650 MG: 325 TABLET ORAL at 18:15

## 2025-04-15 RX ADMIN — POTASSIUM CHLORIDE 20 MEQ: 29.8 INJECTION, SOLUTION INTRAVENOUS at 18:10

## 2025-04-15 RX ADMIN — SUGAMMADEX 200 MG: 100 INJECTION, SOLUTION INTRAVENOUS at 14:09

## 2025-04-15 RX ADMIN — PHENYLEPHRINE HYDROCHLORIDE 200 MCG: 10 INJECTION INTRAVENOUS at 13:31

## 2025-04-15 RX ADMIN — DEXMEDETOMIDINE 0.3 MCG/KG/HR: 100 INJECTION, SOLUTION INTRAVENOUS at 15:05

## 2025-04-15 RX ADMIN — AMIODARONE HYDROCHLORIDE 200 MG: 200 TABLET ORAL at 09:12

## 2025-04-15 RX ADMIN — ROCURONIUM BROMIDE 45 MG: 10 INJECTION, SOLUTION INTRAVENOUS at 13:25

## 2025-04-15 RX ADMIN — ETOMIDATE 12 MG: 2 INJECTION, SOLUTION INTRAVENOUS at 13:10

## 2025-04-15 RX ADMIN — ATORVASTATIN CALCIUM 80 MG: 80 TABLET, FILM COATED ORAL at 22:02

## 2025-04-15 RX ADMIN — ONDANSETRON 4 MG: 2 INJECTION, SOLUTION INTRAMUSCULAR; INTRAVENOUS at 09:21

## 2025-04-15 RX ADMIN — FENTANYL CITRATE 100 MCG: 50 INJECTION, SOLUTION INTRAMUSCULAR; INTRAVENOUS at 13:10

## 2025-04-15 RX ADMIN — OXYCODONE HYDROCHLORIDE AND ACETAMINOPHEN 1 TABLET: 5; 325 TABLET ORAL at 01:30

## 2025-04-15 RX ADMIN — PHENYLEPHRINE HYDROCHLORIDE 100 MCG: 10 INJECTION INTRAVENOUS at 14:05

## 2025-04-15 RX ADMIN — ONDANSETRON 4 MG: 2 INJECTION, SOLUTION INTRAMUSCULAR; INTRAVENOUS at 17:51

## 2025-04-15 RX ADMIN — PHENYLEPHRINE HYDROCHLORIDE 100 MCG: 10 INJECTION INTRAVENOUS at 13:15

## 2025-04-15 RX ADMIN — Medication 1 AMPULE: at 22:00

## 2025-04-15 RX ADMIN — Medication 1 AMPULE: at 09:12

## 2025-04-15 RX ADMIN — SODIUM CHLORIDE, PRESERVATIVE FREE 10 ML: 5 INJECTION INTRAVENOUS at 08:42

## 2025-04-15 RX ADMIN — SODIUM CHLORIDE: 9 INJECTION, SOLUTION INTRAVENOUS at 16:15

## 2025-04-15 RX ADMIN — NITROGLYCERIN 25 MCG: 20 INJECTION INTRAVENOUS at 14:19

## 2025-04-15 RX ADMIN — NICARDIPINE HYDROCHLORIDE 0.2 MG: 0.1 INJECTION INTRAVENOUS at 14:36

## 2025-04-15 RX ADMIN — ACETAMINOPHEN 650 MG: 325 TABLET ORAL at 00:12

## 2025-04-15 ASSESSMENT — PAIN SCALES - GENERAL
PAINLEVEL_OUTOF10: 6
PAINLEVEL_OUTOF10: 4
PAINLEVEL_OUTOF10: 6
PAINLEVEL_OUTOF10: 2
PAINLEVEL_OUTOF10: 10
PAINLEVEL_OUTOF10: 6
PAINLEVEL_OUTOF10: 6
PAINLEVEL_OUTOF10: 0

## 2025-04-15 ASSESSMENT — PAIN DESCRIPTION - PAIN TYPE
TYPE: SURGICAL PAIN
TYPE: SURGICAL PAIN

## 2025-04-15 ASSESSMENT — PAIN DESCRIPTION - ONSET
ONSET: GRADUAL
ONSET: ON-GOING

## 2025-04-15 ASSESSMENT — PAIN DESCRIPTION - LOCATION
LOCATION: CHEST
LOCATION: INCISION;CHEST
LOCATION: INCISION
LOCATION: CHEST
LOCATION: INCISION;CHEST
LOCATION: INCISION;CHEST

## 2025-04-15 ASSESSMENT — PAIN SCALES - WONG BAKER: WONGBAKER_NUMERICALRESPONSE: NO HURT

## 2025-04-15 ASSESSMENT — PAIN DESCRIPTION - ORIENTATION
ORIENTATION: ANTERIOR;MID
ORIENTATION: ANTERIOR
ORIENTATION: ANTERIOR;MID

## 2025-04-15 ASSESSMENT — PAIN DESCRIPTION - DESCRIPTORS
DESCRIPTORS: ACHING
DESCRIPTORS: ACHING;SORE
DESCRIPTORS: ACHING;SORE
DESCRIPTORS: ACHING;DISCOMFORT
DESCRIPTORS: ACHING;SORE
DESCRIPTORS: ACHING;SORE

## 2025-04-15 ASSESSMENT — PAIN - FUNCTIONAL ASSESSMENT
PAIN_FUNCTIONAL_ASSESSMENT: PREVENTS OR INTERFERES SOME ACTIVE ACTIVITIES AND ADLS
PAIN_FUNCTIONAL_ASSESSMENT: ACTIVITIES ARE NOT PREVENTED

## 2025-04-15 ASSESSMENT — PAIN DESCRIPTION - FREQUENCY
FREQUENCY: CONTINUOUS
FREQUENCY: INTERMITTENT

## 2025-04-15 NOTE — INTERDISCIPLINARY ROUNDS
Multi-D Rounds/Checklist (leapfrog):  Lines: can any be removed?: None  per protocol  Chest Tube Left Pleural 1 (Active)       Chest Tube  Mediastinal 2 (Active)       Urinary Catheter 04/14/25 2 Way;Hobson-Temperature (Active)     Introducer 04/14/25 (Active)       Arterial Line 04/14/25 Right Radial (Active)       CVC  04/14/25 Right (Active)     DVT Prophylaxis: Ordered  Vent: N/A  Nutrition Ordered/appropriate: Ordered  Can antibiotics or other drugs be stopped? N/A Yes/No  MRSA swab:   Inpat Anti-Infectives (From admission, onward)      None          Consults needed: None  A: Is pain control adequate? (has PRNs? Stop drip?) Yes  B: Sedation break and SBT? Yes  C: Is sedation choice appropriate? Yes  D: Delirium/CAM-ICU? No  E: Mobility goals/appropriateness? Yes  F: Family update and plan? parent is surrogate decision maker and is being updated daily by primary attending and nursing staff.    Nataliya Echeverria, APRN - CNP

## 2025-04-15 NOTE — BRIEF OP NOTE
Brief Postoperative Note      Patient: Deb Quijano  YOB: 1965  MRN: [id number]    Date of Procedure: 4/15/2025    Pre-Op Diagnosis Codes:      * Cardiac tamponade [I31.4]    Post-Op Diagnosis: Same       Procedure(s):  CARDIAC RE-ENTRY WITH EVACUATION OF HEMATOMA    Surgeon(s):  Nba Garcia MD    Assistant:  Surgical Assistant: Paty Coombs    Anesthesia: General    Estimated Blood Loss (mL): Minimal    Complications: None    Specimens:   * No specimens in log *    Implants:  * No implants in log *      Drains:   Chest Tube Left Pleural 1 (Active)   Chest Tube Airleak No 04/15/25 0800   Status Continuous Suction 04/15/25 0800   Suction -20 cm H2O 04/15/25 0800   Y Connector Used Yes 04/15/25 0800   Drainage Description Sanguinous 04/15/25 0800   Dressing Status Clean, dry & intact;New drainage noted 04/15/25 0800   Chest Tube Dressing Petroleum Jelly 04/15/25 0800   Site Assessment Leaking 04/15/25 0800   Surrounding Skin Intact 04/15/25 0800   Output (ml) 90 ml 04/15/25 1220       Chest Tube  Mediastinal 2 (Active)   Chest Tube Airleak Yes 04/15/25 0800   Status Continuous Suction 04/15/25 0800   Suction -20 cm H2O 04/15/25 0800   Y Connector Used Yes 04/15/25 0800   Drainage Description Sanguinous 04/15/25 0800   Dressing Status Clean, dry & intact;New drainage noted 04/15/25 0800   Chest Tube Dressing Petroleum Jelly 04/15/25 0800   Site Assessment Clean, dry & intact 04/15/25 0800   Surrounding Skin Intact 04/15/25 0800       Urinary Catheter 04/14/25 2 Way;Hobson-Temperature (Active)   Catheter Indications Perioperative use for selected surgical procedures;Need for fluid volume management of the critically ill patient in a critical care setting 04/15/25 0800   Site Assessment No urethral drainage 04/15/25 0800   Urine Color Yellow 04/15/25 0800   Urine Appearance Clear 04/15/25 0800   Collection Container Standard 04/15/25 0800   Securement Method Tape 04/15/25 0800   Catheter Care

## 2025-04-15 NOTE — PERIOP NOTE
TRANSFER - OUT REPORT:    Verbal report given to FELICITY BAEZ on Deb Quijano  being transferred to CVICU for routine progression of patient care       Report consisted of patient's Situation, Background, Assessment and   Recommendations(SBAR).     Information from the following report(s) Nurse Handoff Report and Surgery Report was reviewed with the receiving nurse.           Lines:   CVC  04/14/25 Right (Active)   Central Line Being Utilized Yes 04/15/25 0800   Criteria for Appropriate Use Hemodynamically unstable, requiring monitoring lines, vasopressors, or volume resuscitation 04/15/25 0800   Site Assessment Clean, dry & intact 04/15/25 0800   Phlebitis Assessment No symptoms 04/15/25 0800   Infiltration Assessment 0 04/15/25 0800   Color/Movement/Sensation Capillary refill less than 3 sec 04/15/25 0800   Proximal Lumen Color/Status White;Heparin locked;Brisk blood return;Infusing 04/15/25 0800   Distal Lumen Color/Status Brown;Flushed;Brisk blood return;Infusing 04/15/25 0800   Line Care Connections checked and tightened;Ports disinfected;Cap changed 04/15/25 0800   Alcohol Cap Used Yes 04/15/25 0800   Date of Last Dressing Change 04/14/25 04/15/25 0800   Dressing Type Transparent w/CHG gel 04/15/25 0800   Dressing Status Clean, dry & intact 04/15/25 0800   Dressing Intervention Dressing changed;New 04/14/25 1500       Peripheral IV Right Forearm (Active)   Site Assessment Clean, dry & intact 04/15/25 0800   Line Status Capped;Flushed;Normal saline locked 04/15/25 0300   Line Care Connections checked and tightened;Ports disinfected;Cap changed 04/15/25 0800   Phlebitis Assessment No symptoms 04/15/25 0800   Infiltration Assessment 0 04/15/25 0800   Alcohol Cap Used Yes 04/15/25 0800   Dressing Status Clean, dry & intact 04/15/25 0800   Dressing Type Transparent 04/15/25 0800       Peripheral IV 04/13/25 Posterior;Right Forearm (Active)   Site Assessment Clean, dry & intact 04/15/25 0800   Line Status

## 2025-04-15 NOTE — ANESTHESIA PROCEDURE NOTES
Arterial Line:    An arterial line was placed using ultrasound guidance, in the OR for the following indication(s): continuous blood pressure monitoring and blood sampling needed.    A 20 gauge (size) (length), Arrow (type) catheter was placed, Seldinger technique used, into the left radial artery, secured by tape and Tegaderm.    Events:  patient tolerated procedure well with no complications and EBL < 5mL.    Additional notes:  Potential access site was examined with ultrasound and the acceptable patent access site was selected (site noted above). The needle path and vessel access were visulalized in real time using ultrasonography, an image of wire in vessel was recorded for permanent record.4/15/2025 1:13 PM4/15/2025 1:16 PM  Anesthesiologist: MICK Cabrera MD  Performed: Anesthesiologist   Preanesthetic Checklist  Completed: patient identified, IV checked, site marked, risks and benefits discussed, surgical/procedural consents, equipment checked, pre-op evaluation, timeout performed, anesthesia consent given, oxygen available and monitors applied/VS acknowledged

## 2025-04-15 NOTE — ANESTHESIA POSTPROCEDURE EVALUATION
Department of Anesthesiology  Postprocedure Note    Patient: Deb Quijano  MRN: [id number]  YOB: 1965  Date of evaluation: 4/15/2025    Procedure Summary       Date: 04/15/25 Room / Location:  MAIN OR 05 /  MAIN OR    Anesthesia Start: 1253 Anesthesia Stop: 1445    Procedure: CARDIAC RE-ENTRY WITH EVACUATION OF HEMATOMA (Chest) Diagnosis:       Cardiac tamponade      (Cardiac tamponade [I31.4])    Providers: Nba Garcia MD Responsible Provider: MICK Cabrera MD    Anesthesia Type: General ASA Status: 4            Anesthesia Type: General    Sandro Phase I: Sandro Score: 8    Sandro Phase II:      Anesthesia Post Evaluation    Patient location during evaluation: ICU  Patient participation: complete - patient participated  Level of consciousness: awake and alert  Airway patency: patent  Nausea & Vomiting: no nausea and no vomiting  Cardiovascular status: hemodynamically stable  Respiratory status: acceptable  Hydration status: euvolemic  Comments: Blood pressure (!) 179/67, pulse 84, temperature 99.5 °F (37.5 °C), temperature source Bladder, resp. rate 20, height 1.473 m (4' 10\"), weight 57.3 kg (126 lb 6.4 oz), SpO2 93%.    No apparent anesthetic complications.  Multimodal analgesia pain management approach  Pain management: adequate    No notable events documented.

## 2025-04-15 NOTE — ANESTHESIA PRE PROCEDURE
Department of Anesthesiology  Preprocedure Note       Name:  Deb Quijano   Age:  59 y.o.  :  1965                                          MRN:  [id number]         Date:  4/15/2025      Surgeon: Surgeon(s):  Nba Garcia MD    Procedure: Procedure(s):  CARDIAC RE-ENTRY    Medications prior to admission:   Prior to Admission medications    Not on File       Current medications:    Current Facility-Administered Medications   Medication Dose Route Frequency Provider Last Rate Last Admin   • tuberculin injection 5 Units  5 Units IntraDERmal Once Nba Garcia MD   5 Units at 04/15/25 0339   • 0.9 % sodium chloride infusion   IntraVENous PRN Vazquez Go MD       • insulin regular (HumuLIN R;NovoLIN R) 100 Units in sodium chloride 0.9 % 100 mL infusion  0.1-50 Units/hr IntraVENous Continuous Nba Garcia MD 0.9 mL/hr at 04/15/25 1220 0.9 Units/hr at 04/15/25 1220   • 0.9 % sodium chloride infusion   IntraVENous PRN Nba Garcia MD       • naloxone 0.4 mg in 10 mL sodium chloride syringe   IntraVENous PRN Iván Hernandez MD       • fentaNYL (SUBLIMAZE) injection 50 mcg  50 mcg IntraVENous Q5 Min PRN Iván Hernandez MD       • ipratropium 0.5 mg-albuterol 2.5 mg (DUONEB) nebulizer solution 1 Dose  1 Dose Inhalation Once PRN Iván Hernandez MD       • dextrose 5 % and 0.45 % NaCl with KCl 20 mEq infusion   IntraVENous Continuous Paty Tavares PA       • sodium chloride flush 0.9 % injection 5-40 mL  5-40 mL IntraVENous 2 times per day Paty Tavares PA   10 mL at 04/15/25 0842   • sodium chloride flush 0.9 % injection 5-40 mL  5-40 mL IntraVENous PRN Paty Tavares PA       • 0.9 % sodium chloride infusion   IntraVENous PRN Paty Tavares PA 25 mL/hr at 25 1827 Rate Verify at 25 1827   • ondansetron (ZOFRAN) injection 4 mg  4 mg IntraVENous Q4H PRN Paty Tavares PA   4 mg at 04/15/25 0921   • aspirin EC tablet 81 mg  81 mg Oral

## 2025-04-15 NOTE — PLAN OF CARE
Problem: Discharge Planning  Goal: Discharge to home or other facility with appropriate resources  Outcome: Progressing     Problem: Safety - Adult  Goal: Free from fall injury  4/15/2025 1039 by Lorenzo Morillo RN  Outcome: Progressing  Flowsheets (Taken 4/15/2025 0800)  Free From Fall Injury:   Based on caregiver fall risk screen, instruct family/caregiver to ask for assistance with transferring infant if caregiver noted to have fall risk factors   Instruct family/caregiver on patient safety  4/15/2025 0100 by Carly Palmer RN  Outcome: Progressing  Flowsheets (Taken 4/14/2025 1930 by Christine Olmos RN)  Free From Fall Injury: Instruct family/caregiver on patient safety     Problem: Pain  Goal: Verbalizes/displays adequate comfort level or baseline comfort level  4/15/2025 1039 by Lorenzo Morillo RN  Outcome: Progressing  Flowsheets (Taken 4/15/2025 0800)  Verbalizes/displays adequate comfort level or baseline comfort level:   Encourage patient to monitor pain and request assistance   Assess pain using appropriate pain scale   Administer analgesics based on type and severity of pain and evaluate response   Implement non-pharmacological measures as appropriate and evaluate response  4/15/2025 0100 by Carly Palmer RN  Outcome: Progressing     Problem: Skin/Tissue Integrity  Goal: Skin integrity remains intact  Description: 1.  Monitor for areas of redness and/or skin breakdown2.  Assess vascular access sites hourly3.  Every 4-6 hours minimum:  Change oxygen saturation probe site4.  Every 4-6 hours:  If on nasal continuous positive airway pressure, respiratory therapy assess nares and determine need for appliance change or resting period  Outcome: Progressing  Flowsheets (Taken 4/15/2025 0800)  Skin Integrity Remains Intact:   Monitor for areas of redness and/or skin breakdown   Every 4-6 hours minimum:  Change oxygen saturation probe site   Every 4-6 hours:  If on nasal continuous positive airway

## 2025-04-15 NOTE — OP NOTE
[address]  [address]                            OPERATIVE REPORT      PATIENT NAME: APOLLO DAI                     : 1965  MED REC NO: [id number]                       ROOM: [facility]  ACCOUNT NO: [id number]                       ADMIT DATE: 04/10/2025  PROVIDER: Nba Garcia MD    DATE OF SERVICE:  04/15/2025    PREOPERATIVE DIAGNOSES:  Postop bleed.    POSTOPERATIVE DIAGNOSES:  Postop bleed.    PROCEDURES PERFORMED:  Cardiac reentry with washout of left pleural clot.    SURGEON:  Nba Garcia MD    ASSISTANT:      ANESTHESIA:  General endotracheal with WILLARD.    ESTIMATED BLOOD LOSS:  Minimal.    SPECIMENS REMOVED:      INTRAOPERATIVE FINDINGS:       COMPLICATIONS:  None.    All instrument and sponge counts were correct at the end of the case.    IMPLANTS:      INDICATIONS:  The patient is well known to me, status post 3-vessel CABG by me, who had developed a large shadow on her chest x-ray consistent with clot under her left chest.    DESCRIPTION OF PROCEDURE:  After informed consent was obtained, the patient was brought to the operative suite, placed in supine position.  General anesthesia was induced without difficulty.  She was prepped and draped in usual sterile fashion.  The previous sternotomy was then reentered.  The sternal wires were clipped and removed.  Cecil retractor was placed.  There was noted to be just a minimal amount of clot within the mediastinum.  However, looking at her left chest, there was noted to be significant amount of old organized clot.  This was meticulously removed while doing so.  We found an arterial pump on the mammary bed, which was cauterized.  Again, there was large amount of clot.  This was all removed.  The left chest was copiously irrigated with warm saline.  Chest tubes were cleared of clot.  The heart then was thoroughly investigated.  All distal anastomoses were hemostatic.  All

## 2025-04-15 NOTE — ANESTHESIA PROCEDURE NOTES
Airway  Date/Time: 4/15/2025 1:11 PM  Urgency: elective    Airway not difficult    General Information and Staff    Patient location during procedure: OR  Resident/CRNA: Mady Lee APRN - CRNA  Performed: resident/CRNA   Performed by: Mady Lee APRN - CRNA  Authorized by: MICK Cabrera MD      Indications and Patient Condition  Indications for airway management: anesthesia  Spontaneous Ventilation: absent  Sedation level: deep  Preoxygenated: yes  Patient position: sniffing  MILS not maintained throughout  Mask difficulty assessment: not attempted    Final Airway Details  Final airway type: endotracheal airway      Successful airway: ETT  Cuffed: yes   Successful intubation technique: video laryngoscopy  Facilitating devices/methods: intubating stylet  Endotracheal tube insertion site: oral  Blade type: lopro.  Blade size: #3  ETT size (mm): 7.0  Cormack-Lehane Classification: grade I - full view of glottis  Placement verified by: chest auscultation and capnometry   Measured from: lips  Number of attempts at approach: 1  Number of other approaches attempted: 0    no

## 2025-04-16 ENCOUNTER — APPOINTMENT (OUTPATIENT)
Dept: GENERAL RADIOLOGY | Age: 60
DRG: 234 | End: 2025-04-16
Attending: INTERNAL MEDICINE
Payer: COMMERCIAL

## 2025-04-16 LAB
ABO + RH BLD: NORMAL
ANION GAP SERPL CALC-SCNC: 5 MMOL/L (ref 7–16)
BLD PROD TYP BPU: NORMAL
BLOOD BANK BLOOD PRODUCT EXPIRATION DATE: NORMAL
BLOOD BANK CMNT PATIENT-IMP: NORMAL
BLOOD BANK CMNT PATIENT-IMP: NORMAL
BLOOD BANK DISPENSE STATUS: NORMAL
BLOOD BANK ISBT PRODUCT BLOOD TYPE: 5100
BLOOD BANK ISBT PRODUCT BLOOD TYPE: 600
BLOOD BANK ISBT PRODUCT BLOOD TYPE: 6200
BLOOD BANK PRODUCT CODE: NORMAL
BLOOD BANK UNIT TYPE AND RH: NORMAL
BLOOD GROUP ANTIBODIES SERPL: NORMAL
BPU ID: NORMAL
BUN SERPL-MCNC: 8 MG/DL (ref 8–23)
CALCIUM SERPL-MCNC: 7.7 MG/DL (ref 8.8–10.2)
CHLORIDE SERPL-SCNC: 107 MMOL/L (ref 98–107)
CO2 SERPL-SCNC: 22 MMOL/L (ref 20–29)
CREAT SERPL-MCNC: 0.54 MG/DL (ref 0.6–1.1)
CROSSMATCH RESULT: NORMAL
EKG ATRIAL RATE: 78 BPM
EKG DIAGNOSIS: NORMAL
EKG P AXIS: 34 DEGREES
EKG P-R INTERVAL: 128 MS
EKG Q-T INTERVAL: 378 MS
EKG QRS DURATION: 58 MS
EKG QTC CALCULATION (BAZETT): 430 MS
EKG R AXIS: 13 DEGREES
EKG T AXIS: 29 DEGREES
EKG VENTRICULAR RATE: 78 BPM
ERYTHROCYTE [DISTWIDTH] IN BLOOD BY AUTOMATED COUNT: 13.5 % (ref 11.9–14.6)
GLUCOSE BLD STRIP.AUTO-MCNC: 109 MG/DL (ref 65–100)
GLUCOSE BLD STRIP.AUTO-MCNC: 119 MG/DL (ref 65–100)
GLUCOSE BLD STRIP.AUTO-MCNC: 129 MG/DL (ref 65–100)
GLUCOSE BLD STRIP.AUTO-MCNC: 138 MG/DL (ref 65–100)
GLUCOSE BLD STRIP.AUTO-MCNC: 146 MG/DL (ref 65–100)
GLUCOSE BLD STRIP.AUTO-MCNC: 148 MG/DL (ref 65–100)
GLUCOSE BLD STRIP.AUTO-MCNC: 152 MG/DL (ref 65–100)
GLUCOSE BLD STRIP.AUTO-MCNC: 201 MG/DL (ref 65–100)
GLUCOSE SERPL-MCNC: 101 MG/DL (ref 70–99)
HCT VFR BLD AUTO: 30.4 % (ref 35.8–46.3)
HGB BLD-MCNC: 10.2 G/DL (ref 11.7–15.4)
MAGNESIUM SERPL-MCNC: 2.2 MG/DL (ref 1.8–2.4)
MCH RBC QN AUTO: 31.4 PG (ref 26.1–32.9)
MCHC RBC AUTO-ENTMCNC: 33.6 G/DL (ref 31.4–35)
MCV RBC AUTO: 93.5 FL (ref 82–102)
MM INDURATION, POC: 0 MM (ref 0–5)
NRBC # BLD: 0 K/UL (ref 0–0.2)
PLATELET # BLD AUTO: 107 K/UL (ref 150–450)
PMV BLD AUTO: 10.9 FL (ref 9.4–12.3)
POTASSIUM SERPL-SCNC: 4.4 MMOL/L (ref 3.5–5.1)
PPD, POC: NEGATIVE
RBC # BLD AUTO: 3.25 M/UL (ref 4.05–5.2)
SERVICE CMNT-IMP: ABNORMAL
SODIUM SERPL-SCNC: 134 MMOL/L (ref 136–145)
SPECIMEN EXP DATE BLD: NORMAL
UNIT DIVISION: 0
UNIT ISSUE DATE/TIME: NORMAL
WBC # BLD AUTO: 13.3 K/UL (ref 4.3–11.1)

## 2025-04-16 PROCEDURE — 99232 SBSQ HOSP IP/OBS MODERATE 35: CPT | Performed by: INTERNAL MEDICINE

## 2025-04-16 PROCEDURE — 85027 COMPLETE CBC AUTOMATED: CPT

## 2025-04-16 PROCEDURE — 2700000000 HC OXYGEN THERAPY PER DAY

## 2025-04-16 PROCEDURE — 97530 THERAPEUTIC ACTIVITIES: CPT

## 2025-04-16 PROCEDURE — 6360000002 HC RX W HCPCS: Performed by: PHYSICIAN ASSISTANT

## 2025-04-16 PROCEDURE — 83735 ASSAY OF MAGNESIUM: CPT

## 2025-04-16 PROCEDURE — 6370000000 HC RX 637 (ALT 250 FOR IP): Performed by: PHYSICIAN ASSISTANT

## 2025-04-16 PROCEDURE — 36592 COLLECT BLOOD FROM PICC: CPT

## 2025-04-16 PROCEDURE — 80048 BASIC METABOLIC PNL TOTAL CA: CPT

## 2025-04-16 PROCEDURE — 93005 ELECTROCARDIOGRAM TRACING: CPT | Performed by: PHYSICIAN ASSISTANT

## 2025-04-16 PROCEDURE — 2140000001 HC CVICU INTERMEDIATE R&B

## 2025-04-16 PROCEDURE — 82962 GLUCOSE BLOOD TEST: CPT

## 2025-04-16 PROCEDURE — 71045 X-RAY EXAM CHEST 1 VIEW: CPT

## 2025-04-16 PROCEDURE — 93010 ELECTROCARDIOGRAM REPORT: CPT | Performed by: INTERNAL MEDICINE

## 2025-04-16 PROCEDURE — 2500000003 HC RX 250 WO HCPCS: Performed by: PHYSICIAN ASSISTANT

## 2025-04-16 PROCEDURE — 97162 PT EVAL MOD COMPLEX 30 MIN: CPT

## 2025-04-16 PROCEDURE — 6360000002 HC RX W HCPCS: Performed by: THORACIC SURGERY (CARDIOTHORACIC VASCULAR SURGERY)

## 2025-04-16 RX ORDER — ONDANSETRON 4 MG/1
4 TABLET, ORALLY DISINTEGRATING ORAL EVERY 8 HOURS PRN
Status: DISCONTINUED | OUTPATIENT
Start: 2025-04-16 | End: 2025-04-21 | Stop reason: HOSPADM

## 2025-04-16 RX ORDER — SODIUM CHLORIDE 0.9 % (FLUSH) 0.9 %
5-40 SYRINGE (ML) INJECTION PRN
Status: DISCONTINUED | OUTPATIENT
Start: 2025-04-16 | End: 2025-04-21 | Stop reason: HOSPADM

## 2025-04-16 RX ORDER — TRAMADOL HYDROCHLORIDE 50 MG/1
50 TABLET ORAL EVERY 6 HOURS PRN
Status: DISCONTINUED | OUTPATIENT
Start: 2025-04-16 | End: 2025-04-16

## 2025-04-16 RX ORDER — INSULIN LISPRO 100 [IU]/ML
0-12 INJECTION, SOLUTION INTRAVENOUS; SUBCUTANEOUS
Status: DISCONTINUED | OUTPATIENT
Start: 2025-04-16 | End: 2025-04-21 | Stop reason: HOSPADM

## 2025-04-16 RX ORDER — SCOPOLAMINE 1 MG/3D
1 PATCH, EXTENDED RELEASE TRANSDERMAL
Status: DISCONTINUED | OUTPATIENT
Start: 2025-04-16 | End: 2025-04-21 | Stop reason: HOSPADM

## 2025-04-16 RX ORDER — TRAMADOL HYDROCHLORIDE 50 MG/1
100 TABLET ORAL EVERY 6 HOURS PRN
Status: DISCONTINUED | OUTPATIENT
Start: 2025-04-16 | End: 2025-04-21 | Stop reason: HOSPADM

## 2025-04-16 RX ORDER — DEXTROSE MONOHYDRATE 100 MG/ML
INJECTION, SOLUTION INTRAVENOUS CONTINUOUS PRN
Status: DISCONTINUED | OUTPATIENT
Start: 2025-04-16 | End: 2025-04-21 | Stop reason: HOSPADM

## 2025-04-16 RX ORDER — SODIUM CHLORIDE 0.9 % (FLUSH) 0.9 %
5-40 SYRINGE (ML) INJECTION EVERY 12 HOURS SCHEDULED
Status: DISCONTINUED | OUTPATIENT
Start: 2025-04-16 | End: 2025-04-21 | Stop reason: HOSPADM

## 2025-04-16 RX ORDER — POTASSIUM CHLORIDE 750 MG/1
10 TABLET, EXTENDED RELEASE ORAL DAILY
Status: COMPLETED | OUTPATIENT
Start: 2025-04-17 | End: 2025-04-19

## 2025-04-16 RX ORDER — POLYETHYLENE GLYCOL 3350 17 G/17G
17 POWDER, FOR SOLUTION ORAL DAILY
Status: DISCONTINUED | OUTPATIENT
Start: 2025-04-16 | End: 2025-04-21 | Stop reason: HOSPADM

## 2025-04-16 RX ORDER — TRAMADOL HYDROCHLORIDE 50 MG/1
50 TABLET ORAL EVERY 6 HOURS PRN
Status: DISCONTINUED | OUTPATIENT
Start: 2025-04-16 | End: 2025-04-21 | Stop reason: HOSPADM

## 2025-04-16 RX ORDER — FUROSEMIDE 10 MG/ML
20 INJECTION INTRAMUSCULAR; INTRAVENOUS ONCE
Status: COMPLETED | OUTPATIENT
Start: 2025-04-16 | End: 2025-04-16

## 2025-04-16 RX ORDER — LANOLIN ALCOHOL/MO/W.PET/CERES
400 CREAM (GRAM) TOPICAL 4 TIMES DAILY PRN
Status: DISCONTINUED | OUTPATIENT
Start: 2025-04-16 | End: 2025-04-21 | Stop reason: HOSPADM

## 2025-04-16 RX ORDER — FUROSEMIDE 40 MG/1
40 TABLET ORAL DAILY
Status: COMPLETED | OUTPATIENT
Start: 2025-04-17 | End: 2025-04-19

## 2025-04-16 RX ORDER — POTASSIUM CHLORIDE 1500 MG/1
20 TABLET, EXTENDED RELEASE ORAL 2 TIMES DAILY PRN
Status: DISCONTINUED | OUTPATIENT
Start: 2025-04-16 | End: 2025-04-21 | Stop reason: HOSPADM

## 2025-04-16 RX ORDER — LANOLIN ALCOHOL/MO/W.PET/CERES
400 CREAM (GRAM) TOPICAL 3 TIMES DAILY PRN
Status: DISCONTINUED | OUTPATIENT
Start: 2025-04-16 | End: 2025-04-21 | Stop reason: HOSPADM

## 2025-04-16 RX ORDER — INSULIN LISPRO 100 [IU]/ML
0-6 INJECTION, SOLUTION INTRAVENOUS; SUBCUTANEOUS NIGHTLY
Status: DISCONTINUED | OUTPATIENT
Start: 2025-04-16 | End: 2025-04-21 | Stop reason: HOSPADM

## 2025-04-16 RX ORDER — FAMOTIDINE 20 MG/1
20 TABLET, FILM COATED ORAL 2 TIMES DAILY
Status: DISCONTINUED | OUTPATIENT
Start: 2025-04-16 | End: 2025-04-21 | Stop reason: HOSPADM

## 2025-04-16 RX ORDER — ONDANSETRON 2 MG/ML
4 INJECTION INTRAMUSCULAR; INTRAVENOUS EVERY 6 HOURS PRN
Status: DISCONTINUED | OUTPATIENT
Start: 2025-04-16 | End: 2025-04-21 | Stop reason: HOSPADM

## 2025-04-16 RX ORDER — POTASSIUM CHLORIDE 1500 MG/1
40 TABLET, EXTENDED RELEASE ORAL 2 TIMES DAILY PRN
Status: DISCONTINUED | OUTPATIENT
Start: 2025-04-16 | End: 2025-04-21 | Stop reason: HOSPADM

## 2025-04-16 RX ORDER — SENNA AND DOCUSATE SODIUM 50; 8.6 MG/1; MG/1
1 TABLET, FILM COATED ORAL 2 TIMES DAILY
Status: DISCONTINUED | OUTPATIENT
Start: 2025-04-16 | End: 2025-04-21 | Stop reason: HOSPADM

## 2025-04-16 RX ADMIN — TRAMADOL HYDROCHLORIDE 100 MG: 50 TABLET, COATED ORAL at 16:52

## 2025-04-16 RX ADMIN — Medication 1 AMPULE: at 08:48

## 2025-04-16 RX ADMIN — INSULIN LISPRO 2 UNITS: 100 INJECTION, SOLUTION INTRAVENOUS; SUBCUTANEOUS at 16:53

## 2025-04-16 RX ADMIN — ACETAMINOPHEN 650 MG: 325 TABLET ORAL at 00:05

## 2025-04-16 RX ADMIN — ONDANSETRON 4 MG: 2 INJECTION, SOLUTION INTRAMUSCULAR; INTRAVENOUS at 21:09

## 2025-04-16 RX ADMIN — SODIUM CHLORIDE, PRESERVATIVE FREE 10 ML: 5 INJECTION INTRAVENOUS at 21:11

## 2025-04-16 RX ADMIN — ACETAMINOPHEN 650 MG: 325 TABLET ORAL at 23:35

## 2025-04-16 RX ADMIN — FUROSEMIDE 20 MG: 10 INJECTION, SOLUTION INTRAMUSCULAR; INTRAVENOUS at 07:57

## 2025-04-16 RX ADMIN — SODIUM CHLORIDE, PRESERVATIVE FREE 10 ML: 5 INJECTION INTRAVENOUS at 08:50

## 2025-04-16 RX ADMIN — ONDANSETRON 4 MG: 2 INJECTION, SOLUTION INTRAMUSCULAR; INTRAVENOUS at 03:22

## 2025-04-16 RX ADMIN — FAMOTIDINE 20 MG: 20 TABLET, FILM COATED ORAL at 21:09

## 2025-04-16 RX ADMIN — Medication 1 AMPULE: at 21:09

## 2025-04-16 RX ADMIN — AMIODARONE HYDROCHLORIDE 200 MG: 200 TABLET ORAL at 08:48

## 2025-04-16 RX ADMIN — ONDANSETRON 4 MG: 2 INJECTION, SOLUTION INTRAMUSCULAR; INTRAVENOUS at 10:35

## 2025-04-16 RX ADMIN — ATORVASTATIN CALCIUM 80 MG: 80 TABLET, FILM COATED ORAL at 21:09

## 2025-04-16 RX ADMIN — ACETAMINOPHEN 650 MG: 325 TABLET ORAL at 16:53

## 2025-04-16 RX ADMIN — AMIODARONE HYDROCHLORIDE 200 MG: 200 TABLET ORAL at 21:09

## 2025-04-16 RX ADMIN — KETOROLAC TROMETHAMINE 15 MG: 15 INJECTION, SOLUTION INTRAMUSCULAR; INTRAVENOUS at 10:39

## 2025-04-16 RX ADMIN — METOPROLOL TARTRATE 12.5 MG: 25 TABLET, FILM COATED ORAL at 08:48

## 2025-04-16 RX ADMIN — FAMOTIDINE 20 MG: 20 TABLET, FILM COATED ORAL at 08:48

## 2025-04-16 RX ADMIN — ACETAMINOPHEN 650 MG: 325 TABLET ORAL at 08:47

## 2025-04-16 RX ADMIN — INSULIN LISPRO 2 UNITS: 100 INJECTION, SOLUTION INTRAVENOUS; SUBCUTANEOUS at 21:09

## 2025-04-16 RX ADMIN — METOPROLOL TARTRATE 12.5 MG: 25 TABLET, FILM COATED ORAL at 21:10

## 2025-04-16 RX ADMIN — ASPIRIN 81 MG: 81 TABLET ORAL at 08:47

## 2025-04-16 RX ADMIN — DOCUSATE SODIUM 50 MG AND SENNOSIDES 8.6 MG 1 TABLET: 8.6; 5 TABLET, FILM COATED ORAL at 21:09

## 2025-04-16 ASSESSMENT — PAIN SCALES - GENERAL
PAINLEVEL_OUTOF10: 3
PAINLEVEL_OUTOF10: 6
PAINLEVEL_OUTOF10: 7
PAINLEVEL_OUTOF10: 2
PAINLEVEL_OUTOF10: 4
PAINLEVEL_OUTOF10: 2
PAINLEVEL_OUTOF10: 1
PAINLEVEL_OUTOF10: 1
PAINLEVEL_OUTOF10: 2
PAINLEVEL_OUTOF10: 3

## 2025-04-16 ASSESSMENT — PAIN DESCRIPTION - PAIN TYPE
TYPE: SURGICAL PAIN

## 2025-04-16 ASSESSMENT — PAIN DESCRIPTION - LOCATION
LOCATION: INCISION
LOCATION: CHEST;INCISION
LOCATION: INCISION;CHEST
LOCATION: CHEST;INCISION
LOCATION: CHEST

## 2025-04-16 ASSESSMENT — PAIN DESCRIPTION - FREQUENCY
FREQUENCY: INTERMITTENT
FREQUENCY: INTERMITTENT
FREQUENCY: CONTINUOUS
FREQUENCY: CONTINUOUS

## 2025-04-16 ASSESSMENT — PAIN DESCRIPTION - ONSET
ONSET: ON-GOING
ONSET: GRADUAL

## 2025-04-16 ASSESSMENT — PAIN DESCRIPTION - DESCRIPTORS
DESCRIPTORS: ACHING

## 2025-04-16 ASSESSMENT — PAIN - FUNCTIONAL ASSESSMENT
PAIN_FUNCTIONAL_ASSESSMENT: ACTIVITIES ARE NOT PREVENTED

## 2025-04-16 ASSESSMENT — PAIN DESCRIPTION - ORIENTATION
ORIENTATION: ANTERIOR
ORIENTATION: ANTERIOR;MID
ORIENTATION: ANTERIOR

## 2025-04-16 NOTE — PLAN OF CARE
Problem: Discharge Planning  Goal: Discharge to home or other facility with appropriate resources  4/16/2025 1515 by Justin Lundberg RN  Outcome: Progressing  4/16/2025 1011 by Lorenzo Morillo RN  Outcome: Progressing  Flowsheets (Taken 4/16/2025 0714)  Discharge to home or other facility with appropriate resources:   Identify barriers to discharge with patient and caregiver   Arrange for needed discharge resources and transportation as appropriate   Identify discharge learning needs (meds, wound care, etc)     Problem: Safety - Adult  Goal: Free from fall injury  4/16/2025 1515 by Justin Lundberg RN  Outcome: Progressing  4/16/2025 1011 by Lorenzo Morillo RN  Outcome: Progressing  Flowsheets (Taken 4/16/2025 0714)  Free From Fall Injury:   Instruct family/caregiver on patient safety   Based on caregiver fall risk screen, instruct family/caregiver to ask for assistance with transferring infant if caregiver noted to have fall risk factors     Problem: Pain  Goal: Verbalizes/displays adequate comfort level or baseline comfort level  4/16/2025 1515 by Justin Lundberg RN  Outcome: Progressing  Flowsheets (Taken 4/16/2025 1330)  Verbalizes/displays adequate comfort level or baseline comfort level: Encourage patient to monitor pain and request assistance  4/16/2025 1011 by Lorenzo Morillo RN  Outcome: Progressing  Flowsheets (Taken 4/16/2025 0714)  Verbalizes/displays adequate comfort level or baseline comfort level:   Encourage patient to monitor pain and request assistance   Assess pain using appropriate pain scale   Administer analgesics based on type and severity of pain and evaluate response   Implement non-pharmacological measures as appropriate and evaluate response     Problem: Skin/Tissue Integrity  Goal: Skin integrity remains intact  Description: 1.  Monitor for areas of redness and/or skin breakdown2.  Assess vascular access sites hourly3.  Every 4-6 hours minimum:  Change oxygen saturation probe site4.

## 2025-04-16 NOTE — PLAN OF CARE
Problem: Discharge Planning  Goal: Discharge to home or other facility with appropriate resources  Outcome: Progressing  Flowsheets (Taken 4/16/2025 0714)  Discharge to home or other facility with appropriate resources:   Identify barriers to discharge with patient and caregiver   Arrange for needed discharge resources and transportation as appropriate   Identify discharge learning needs (meds, wound care, etc)     Problem: Safety - Adult  Goal: Free from fall injury  Outcome: Progressing  Flowsheets (Taken 4/16/2025 0714)  Free From Fall Injury:   Instruct family/caregiver on patient safety   Based on caregiver fall risk screen, instruct family/caregiver to ask for assistance with transferring infant if caregiver noted to have fall risk factors     Problem: Pain  Goal: Verbalizes/displays adequate comfort level or baseline comfort level  Outcome: Progressing  Flowsheets (Taken 4/16/2025 0714)  Verbalizes/displays adequate comfort level or baseline comfort level:   Encourage patient to monitor pain and request assistance   Assess pain using appropriate pain scale   Administer analgesics based on type and severity of pain and evaluate response   Implement non-pharmacological measures as appropriate and evaluate response     Problem: Skin/Tissue Integrity  Goal: Skin integrity remains intact  Description: 1.  Monitor for areas of redness and/or skin breakdown2.  Assess vascular access sites hourly3.  Every 4-6 hours minimum:  Change oxygen saturation probe site4.  Every 4-6 hours:  If on nasal continuous positive airway pressure, respiratory therapy assess nares and determine need for appliance change or resting period  Outcome: Progressing  Flowsheets (Taken 4/16/2025 0714)  Skin Integrity Remains Intact:   Assess vascular access sites hourly   Every 4-6 hours:  If on nasal continuous positive airway pressure, assess nares and determine need for appliance change or resting period   Every 4-6 hours minimum:  Change

## 2025-04-16 NOTE — INTERDISCIPLINARY ROUNDS
Multi-D Rounds/Checklist (leapfrog):  Lines: can any be removed?: None  per protocol  Chest Tube Left Pleural 1 (Active)       Chest Tube  Mediastinal 2 (Active)       Urinary Catheter 04/14/25 2 Way;Hobson-Temperature (Active)     Introducer 04/14/25 (Active)       CVC  04/14/25 Right (Active)     DVT Prophylaxis: Ordered  Vent: N/A  Nutrition Ordered/appropriate: Ordered  Can antibiotics or other drugs be stopped? N/A Yes/No  MRSA swab:   Inpat Anti-Infectives (From admission, onward)      None          Consults needed: None  A: Is pain control adequate? (has PRNs? Stop drip?) Yes  B: Sedation break and SBT? N/A  C: Is sedation choice appropriate? N/A  D: Delirium/CAM-ICU? No  E: Mobility goals/appropriateness? Yes  F: Family update and plan? parent is surrogate decision maker and is being updated daily by primary attending and nursing staff.    Nataliya Echeverria, APRN - CNP

## 2025-04-17 ENCOUNTER — APPOINTMENT (OUTPATIENT)
Dept: GENERAL RADIOLOGY | Age: 60
DRG: 234 | End: 2025-04-17
Attending: INTERNAL MEDICINE
Payer: COMMERCIAL

## 2025-04-17 PROBLEM — E55.9 VITAMIN D DEFICIENCY: Status: ACTIVE | Noted: 2018-05-07

## 2025-04-17 PROBLEM — E78.2 MIXED HYPERLIPIDEMIA: Status: ACTIVE | Noted: 2025-04-17

## 2025-04-17 PROBLEM — J90 PLEURAL EFFUSION: Status: ACTIVE | Noted: 2025-04-17

## 2025-04-17 PROBLEM — E11.59 TYPE 2 DIABETES MELLITUS WITH CIRCULATORY DISORDER, WITHOUT LONG-TERM CURRENT USE OF INSULIN (HCC): Status: ACTIVE | Noted: 2025-04-10

## 2025-04-17 LAB
ANION GAP SERPL CALC-SCNC: 6 MMOL/L (ref 7–16)
BUN SERPL-MCNC: 15 MG/DL (ref 8–23)
CALCIUM SERPL-MCNC: 8.1 MG/DL (ref 8.8–10.2)
CHLORIDE SERPL-SCNC: 99 MMOL/L (ref 98–107)
CO2 SERPL-SCNC: 25 MMOL/L (ref 20–29)
CREAT SERPL-MCNC: 0.6 MG/DL (ref 0.6–1.1)
ERYTHROCYTE [DISTWIDTH] IN BLOOD BY AUTOMATED COUNT: 13.7 % (ref 11.9–14.6)
GLUCOSE BLD STRIP.AUTO-MCNC: 131 MG/DL (ref 65–100)
GLUCOSE BLD STRIP.AUTO-MCNC: 138 MG/DL (ref 65–100)
GLUCOSE BLD STRIP.AUTO-MCNC: 138 MG/DL (ref 65–100)
GLUCOSE BLD STRIP.AUTO-MCNC: 158 MG/DL (ref 65–100)
GLUCOSE SERPL-MCNC: 127 MG/DL (ref 70–99)
HCT VFR BLD AUTO: 30.3 % (ref 35.8–46.3)
HGB BLD-MCNC: 10.2 G/DL (ref 11.7–15.4)
MAGNESIUM SERPL-MCNC: 2.2 MG/DL (ref 1.8–2.4)
MCH RBC QN AUTO: 32.1 PG (ref 26.1–32.9)
MCHC RBC AUTO-ENTMCNC: 33.7 G/DL (ref 31.4–35)
MCV RBC AUTO: 95.3 FL (ref 82–102)
MM INDURATION, POC: 0 MM (ref 0–5)
NRBC # BLD: 0 K/UL (ref 0–0.2)
PLATELET # BLD AUTO: 136 K/UL (ref 150–450)
PMV BLD AUTO: 10.8 FL (ref 9.4–12.3)
POTASSIUM SERPL-SCNC: 4.4 MMOL/L (ref 3.5–5.1)
PPD, POC: NEGATIVE
RBC # BLD AUTO: 3.18 M/UL (ref 4.05–5.2)
SERVICE CMNT-IMP: ABNORMAL
SODIUM SERPL-SCNC: 129 MMOL/L (ref 136–145)
WBC # BLD AUTO: 12.8 K/UL (ref 4.3–11.1)

## 2025-04-17 PROCEDURE — 99232 SBSQ HOSP IP/OBS MODERATE 35: CPT | Performed by: INTERNAL MEDICINE

## 2025-04-17 PROCEDURE — 99024 POSTOP FOLLOW-UP VISIT: CPT | Performed by: PHYSICIAN ASSISTANT

## 2025-04-17 PROCEDURE — 76937 US GUIDE VASCULAR ACCESS: CPT

## 2025-04-17 PROCEDURE — 71046 X-RAY EXAM CHEST 2 VIEWS: CPT

## 2025-04-17 PROCEDURE — 36415 COLL VENOUS BLD VENIPUNCTURE: CPT

## 2025-04-17 PROCEDURE — 80048 BASIC METABOLIC PNL TOTAL CA: CPT

## 2025-04-17 PROCEDURE — 83735 ASSAY OF MAGNESIUM: CPT

## 2025-04-17 PROCEDURE — 2500000003 HC RX 250 WO HCPCS: Performed by: PHYSICIAN ASSISTANT

## 2025-04-17 PROCEDURE — 97110 THERAPEUTIC EXERCISES: CPT

## 2025-04-17 PROCEDURE — 6360000002 HC RX W HCPCS: Performed by: PHYSICIAN ASSISTANT

## 2025-04-17 PROCEDURE — 2140000001 HC CVICU INTERMEDIATE R&B

## 2025-04-17 PROCEDURE — 97530 THERAPEUTIC ACTIVITIES: CPT

## 2025-04-17 PROCEDURE — 82962 GLUCOSE BLOOD TEST: CPT

## 2025-04-17 PROCEDURE — 6370000000 HC RX 637 (ALT 250 FOR IP): Performed by: PHYSICIAN ASSISTANT

## 2025-04-17 PROCEDURE — 85027 COMPLETE CBC AUTOMATED: CPT

## 2025-04-17 RX ADMIN — POLYETHYLENE GLYCOL 3350 17 G: 17 POWDER, FOR SOLUTION ORAL at 09:28

## 2025-04-17 RX ADMIN — AMIODARONE HYDROCHLORIDE 200 MG: 200 TABLET ORAL at 09:29

## 2025-04-17 RX ADMIN — POTASSIUM CHLORIDE 10 MEQ: 750 TABLET, EXTENDED RELEASE ORAL at 09:29

## 2025-04-17 RX ADMIN — ONDANSETRON 4 MG: 2 INJECTION, SOLUTION INTRAMUSCULAR; INTRAVENOUS at 09:31

## 2025-04-17 RX ADMIN — ASPIRIN 81 MG: 81 TABLET ORAL at 09:28

## 2025-04-17 RX ADMIN — INSULIN LISPRO 2 UNITS: 100 INJECTION, SOLUTION INTRAVENOUS; SUBCUTANEOUS at 11:42

## 2025-04-17 RX ADMIN — SODIUM CHLORIDE, PRESERVATIVE FREE 10 ML: 5 INJECTION INTRAVENOUS at 09:31

## 2025-04-17 RX ADMIN — ATORVASTATIN CALCIUM 80 MG: 80 TABLET, FILM COATED ORAL at 20:16

## 2025-04-17 RX ADMIN — TRAMADOL HYDROCHLORIDE 50 MG: 50 TABLET, COATED ORAL at 12:31

## 2025-04-17 RX ADMIN — DOCUSATE SODIUM 50 MG AND SENNOSIDES 8.6 MG 1 TABLET: 8.6; 5 TABLET, FILM COATED ORAL at 20:15

## 2025-04-17 RX ADMIN — METOPROLOL TARTRATE 12.5 MG: 25 TABLET, FILM COATED ORAL at 20:15

## 2025-04-17 RX ADMIN — FUROSEMIDE 40 MG: 40 TABLET ORAL at 09:29

## 2025-04-17 RX ADMIN — METOPROLOL TARTRATE 12.5 MG: 25 TABLET, FILM COATED ORAL at 09:29

## 2025-04-17 RX ADMIN — ACETAMINOPHEN 650 MG: 325 TABLET ORAL at 11:41

## 2025-04-17 RX ADMIN — TRAMADOL HYDROCHLORIDE 50 MG: 50 TABLET, COATED ORAL at 18:45

## 2025-04-17 RX ADMIN — AMIODARONE HYDROCHLORIDE 200 MG: 200 TABLET ORAL at 20:16

## 2025-04-17 RX ADMIN — DOCUSATE SODIUM 50 MG AND SENNOSIDES 8.6 MG 1 TABLET: 8.6; 5 TABLET, FILM COATED ORAL at 09:28

## 2025-04-17 RX ADMIN — ACETAMINOPHEN 650 MG: 325 TABLET ORAL at 23:57

## 2025-04-17 RX ADMIN — ACETAMINOPHEN 650 MG: 325 TABLET ORAL at 17:59

## 2025-04-17 RX ADMIN — FAMOTIDINE 20 MG: 20 TABLET, FILM COATED ORAL at 09:29

## 2025-04-17 RX ADMIN — FAMOTIDINE 20 MG: 20 TABLET, FILM COATED ORAL at 20:17

## 2025-04-17 RX ADMIN — Medication 1 AMPULE: at 20:12

## 2025-04-17 RX ADMIN — Medication 1 AMPULE: at 09:42

## 2025-04-17 RX ADMIN — SODIUM CHLORIDE, PRESERVATIVE FREE 10 ML: 5 INJECTION INTRAVENOUS at 19:40

## 2025-04-17 RX ADMIN — ONDANSETRON 4 MG: 2 INJECTION, SOLUTION INTRAMUSCULAR; INTRAVENOUS at 03:23

## 2025-04-17 RX ADMIN — TRAMADOL HYDROCHLORIDE 50 MG: 50 TABLET, COATED ORAL at 09:31

## 2025-04-17 ASSESSMENT — PAIN - FUNCTIONAL ASSESSMENT
PAIN_FUNCTIONAL_ASSESSMENT: ACTIVITIES ARE NOT PREVENTED

## 2025-04-17 ASSESSMENT — PAIN DESCRIPTION - ONSET
ONSET: GRADUAL
ONSET: ON-GOING
ONSET: ON-GOING

## 2025-04-17 ASSESSMENT — PAIN SCALES - GENERAL
PAINLEVEL_OUTOF10: 0
PAINLEVEL_OUTOF10: 0
PAINLEVEL_OUTOF10: 5
PAINLEVEL_OUTOF10: 4
PAINLEVEL_OUTOF10: 0
PAINLEVEL_OUTOF10: 6
PAINLEVEL_OUTOF10: 3
PAINLEVEL_OUTOF10: 0
PAINLEVEL_OUTOF10: 0

## 2025-04-17 ASSESSMENT — PAIN DESCRIPTION - DESCRIPTORS
DESCRIPTORS: ACHING

## 2025-04-17 ASSESSMENT — PAIN DESCRIPTION - FREQUENCY
FREQUENCY: CONTINUOUS
FREQUENCY: CONTINUOUS
FREQUENCY: INTERMITTENT

## 2025-04-17 ASSESSMENT — PAIN DESCRIPTION - PAIN TYPE
TYPE: SURGICAL PAIN
TYPE: SURGICAL PAIN

## 2025-04-17 ASSESSMENT — PAIN DESCRIPTION - ORIENTATION
ORIENTATION: ANTERIOR

## 2025-04-17 ASSESSMENT — PAIN DESCRIPTION - LOCATION
LOCATION: CHEST;INCISION
LOCATION: CHEST
LOCATION: CHEST

## 2025-04-17 NOTE — PLAN OF CARE
Problem: Discharge Planning  Goal: Discharge to home or other facility with appropriate resources  4/17/2025 1052 by Justin Lundberg RN  Outcome: Progressing  Flowsheets (Taken 4/17/2025 0830)  Discharge to home or other facility with appropriate resources: Identify barriers to discharge with patient and caregiver  4/16/2025 2151 by Allyson Keen RN  Outcome: Progressing  Flowsheets (Taken 4/16/2025 2110)  Discharge to home or other facility with appropriate resources:   Identify barriers to discharge with patient and caregiver   Arrange for needed discharge resources and transportation as appropriate     Problem: Safety - Adult  Goal: Free from fall injury  4/17/2025 1052 by Justin Lundberg RN  Outcome: Progressing  Flowsheets (Taken 4/17/2025 0830)  Free From Fall Injury: Instruct family/caregiver on patient safety  4/16/2025 2151 by Allyson Keen RN  Outcome: Progressing  Flowsheets (Taken 4/16/2025 2110)  Free From Fall Injury: Instruct family/caregiver on patient safety     Problem: Pain  Goal: Verbalizes/displays adequate comfort level or baseline comfort level  4/17/2025 1052 by Justin Lundberg RN  Outcome: Progressing  Flowsheets (Taken 4/17/2025 0830)  Verbalizes/displays adequate comfort level or baseline comfort level: Encourage patient to monitor pain and request assistance  4/16/2025 2151 by Allyson Keen RN  Outcome: Progressing  Flowsheets  Taken 4/16/2025 2054 by Allyson Keen RN  Verbalizes/displays adequate comfort level or baseline comfort level:   Encourage patient to monitor pain and request assistance   Assess pain using appropriate pain scale  Taken 4/16/2025 1652 by Justin Lundberg RN  Verbalizes/displays adequate comfort level or baseline comfort level: Encourage patient to monitor pain and request assistance     Problem: Skin/Tissue Integrity  Goal: Skin integrity remains intact  Description: 1.  Monitor for areas of redness and/or skin breakdown2.  Assess vascular access

## 2025-04-17 NOTE — CARE COORDINATION
CM spoke to patient at bedside to inform her PT is recommending home health care. Patient stated that she has concerns her mother will not be able to take care of her at home and she wants to go to SNF for STR.     CM gave patient choice list for home health and SNF for review.     CM sent referral to [facility] as requested by patient. CM informed patient she may not have insurance benefit for STR.

## 2025-04-18 ENCOUNTER — APPOINTMENT (OUTPATIENT)
Dept: GENERAL RADIOLOGY | Age: 60
DRG: 234 | End: 2025-04-18
Attending: INTERNAL MEDICINE
Payer: COMMERCIAL

## 2025-04-18 LAB
GLUCOSE BLD STRIP.AUTO-MCNC: 116 MG/DL (ref 65–100)
GLUCOSE BLD STRIP.AUTO-MCNC: 117 MG/DL (ref 65–100)
GLUCOSE BLD STRIP.AUTO-MCNC: 148 MG/DL (ref 65–100)
GLUCOSE BLD STRIP.AUTO-MCNC: 151 MG/DL (ref 65–100)
MAGNESIUM SERPL-MCNC: 2 MG/DL (ref 1.8–2.4)
MM INDURATION, POC: 0 MM (ref 0–5)
POTASSIUM SERPL-SCNC: 4.1 MMOL/L (ref 3.5–5.1)
PPD, POC: NEGATIVE
SERVICE CMNT-IMP: ABNORMAL

## 2025-04-18 PROCEDURE — 2140000001 HC CVICU INTERMEDIATE R&B

## 2025-04-18 PROCEDURE — 6370000000 HC RX 637 (ALT 250 FOR IP): Performed by: PHYSICIAN ASSISTANT

## 2025-04-18 PROCEDURE — 36415 COLL VENOUS BLD VENIPUNCTURE: CPT

## 2025-04-18 PROCEDURE — 82962 GLUCOSE BLOOD TEST: CPT

## 2025-04-18 PROCEDURE — 97110 THERAPEUTIC EXERCISES: CPT

## 2025-04-18 PROCEDURE — 99232 SBSQ HOSP IP/OBS MODERATE 35: CPT | Performed by: INTERNAL MEDICINE

## 2025-04-18 PROCEDURE — 97530 THERAPEUTIC ACTIVITIES: CPT

## 2025-04-18 PROCEDURE — 84132 ASSAY OF SERUM POTASSIUM: CPT

## 2025-04-18 PROCEDURE — 2500000003 HC RX 250 WO HCPCS: Performed by: PHYSICIAN ASSISTANT

## 2025-04-18 PROCEDURE — 83735 ASSAY OF MAGNESIUM: CPT

## 2025-04-18 PROCEDURE — 6360000002 HC RX W HCPCS: Performed by: PHYSICIAN ASSISTANT

## 2025-04-18 PROCEDURE — 71045 X-RAY EXAM CHEST 1 VIEW: CPT

## 2025-04-18 RX ORDER — MENTHOL/CAMPHOR/ALLANTOIN/PHE 0.6-0.5-1%
OINTMENT(EA) TOPICAL PRN
Status: DISCONTINUED | OUTPATIENT
Start: 2025-04-18 | End: 2025-04-21 | Stop reason: HOSPADM

## 2025-04-18 RX ADMIN — ACETAMINOPHEN 650 MG: 325 TABLET ORAL at 13:02

## 2025-04-18 RX ADMIN — Medication 1 AMPULE: at 21:00

## 2025-04-18 RX ADMIN — POTASSIUM CHLORIDE 10 MEQ: 750 TABLET, EXTENDED RELEASE ORAL at 09:07

## 2025-04-18 RX ADMIN — TRAMADOL HYDROCHLORIDE 50 MG: 50 TABLET, COATED ORAL at 15:04

## 2025-04-18 RX ADMIN — ASPIRIN 81 MG: 81 TABLET ORAL at 09:07

## 2025-04-18 RX ADMIN — FAMOTIDINE 20 MG: 20 TABLET, FILM COATED ORAL at 20:56

## 2025-04-18 RX ADMIN — ACETAMINOPHEN 650 MG: 325 TABLET ORAL at 23:25

## 2025-04-18 RX ADMIN — ONDANSETRON 4 MG: 2 INJECTION, SOLUTION INTRAMUSCULAR; INTRAVENOUS at 03:27

## 2025-04-18 RX ADMIN — AMIODARONE HYDROCHLORIDE 200 MG: 200 TABLET ORAL at 20:56

## 2025-04-18 RX ADMIN — INSULIN LISPRO 2 UNITS: 100 INJECTION, SOLUTION INTRAVENOUS; SUBCUTANEOUS at 16:54

## 2025-04-18 RX ADMIN — INSULIN LISPRO 2 UNITS: 100 INJECTION, SOLUTION INTRAVENOUS; SUBCUTANEOUS at 08:00

## 2025-04-18 RX ADMIN — FAMOTIDINE 20 MG: 20 TABLET, FILM COATED ORAL at 09:06

## 2025-04-18 RX ADMIN — ACETAMINOPHEN 650 MG: 325 TABLET ORAL at 16:53

## 2025-04-18 RX ADMIN — POLYETHYLENE GLYCOL 3350 17 G: 17 POWDER, FOR SOLUTION ORAL at 09:05

## 2025-04-18 RX ADMIN — ATORVASTATIN CALCIUM 80 MG: 80 TABLET, FILM COATED ORAL at 20:56

## 2025-04-18 RX ADMIN — METOPROLOL TARTRATE 12.5 MG: 25 TABLET, FILM COATED ORAL at 20:56

## 2025-04-18 RX ADMIN — METOPROLOL TARTRATE 12.5 MG: 25 TABLET, FILM COATED ORAL at 09:08

## 2025-04-18 RX ADMIN — TRAMADOL HYDROCHLORIDE 50 MG: 50 TABLET, COATED ORAL at 01:09

## 2025-04-18 RX ADMIN — TRAMADOL HYDROCHLORIDE 100 MG: 50 TABLET, COATED ORAL at 23:25

## 2025-04-18 RX ADMIN — TRAMADOL HYDROCHLORIDE 50 MG: 50 TABLET, COATED ORAL at 09:06

## 2025-04-18 RX ADMIN — SODIUM CHLORIDE, PRESERVATIVE FREE 10 ML: 5 INJECTION INTRAVENOUS at 09:08

## 2025-04-18 RX ADMIN — DOCUSATE SODIUM 50 MG AND SENNOSIDES 8.6 MG 1 TABLET: 8.6; 5 TABLET, FILM COATED ORAL at 20:56

## 2025-04-18 RX ADMIN — DOCUSATE SODIUM 50 MG AND SENNOSIDES 8.6 MG 1 TABLET: 8.6; 5 TABLET, FILM COATED ORAL at 09:05

## 2025-04-18 RX ADMIN — AMIODARONE HYDROCHLORIDE 200 MG: 200 TABLET ORAL at 09:05

## 2025-04-18 RX ADMIN — FUROSEMIDE 40 MG: 40 TABLET ORAL at 09:07

## 2025-04-18 RX ADMIN — ONDANSETRON 4 MG: 4 TABLET, ORALLY DISINTEGRATING ORAL at 09:05

## 2025-04-18 RX ADMIN — SODIUM CHLORIDE, PRESERVATIVE FREE 10 ML: 5 INJECTION INTRAVENOUS at 21:00

## 2025-04-18 RX ADMIN — Medication 1 AMPULE: at 09:07

## 2025-04-18 RX ADMIN — ACETAMINOPHEN 650 MG: 325 TABLET ORAL at 05:22

## 2025-04-18 ASSESSMENT — PAIN - FUNCTIONAL ASSESSMENT
PAIN_FUNCTIONAL_ASSESSMENT: PREVENTS OR INTERFERES WITH ALL ACTIVE AND SOME PASSIVE ACTIVITIES
PAIN_FUNCTIONAL_ASSESSMENT: ACTIVITIES ARE NOT PREVENTED

## 2025-04-18 ASSESSMENT — PAIN SCALES - GENERAL
PAINLEVEL_OUTOF10: 0
PAINLEVEL_OUTOF10: 5
PAINLEVEL_OUTOF10: 6
PAINLEVEL_OUTOF10: 0
PAINLEVEL_OUTOF10: 8
PAINLEVEL_OUTOF10: 0
PAINLEVEL_OUTOF10: 0
PAINLEVEL_OUTOF10: 4
PAINLEVEL_OUTOF10: 0
PAINLEVEL_OUTOF10: 0
PAINLEVEL_OUTOF10: 2
PAINLEVEL_OUTOF10: 0
PAINLEVEL_OUTOF10: 0
PAINLEVEL_OUTOF10: 5
PAINLEVEL_OUTOF10: 0
PAINLEVEL_OUTOF10: 0

## 2025-04-18 ASSESSMENT — PAIN DESCRIPTION - LOCATION
LOCATION: CHEST
LOCATION: CHEST;INCISION
LOCATION: CHEST
LOCATION: INCISION
LOCATION: CHEST

## 2025-04-18 ASSESSMENT — PAIN DESCRIPTION - FREQUENCY
FREQUENCY: INTERMITTENT
FREQUENCY: CONTINUOUS

## 2025-04-18 ASSESSMENT — PAIN DESCRIPTION - ORIENTATION
ORIENTATION: ANTERIOR;MID
ORIENTATION: ANTERIOR;MID
ORIENTATION: MID
ORIENTATION: ANTERIOR
ORIENTATION: ANTERIOR

## 2025-04-18 ASSESSMENT — PAIN DESCRIPTION - PAIN TYPE
TYPE: SURGICAL PAIN

## 2025-04-18 ASSESSMENT — PAIN DESCRIPTION - ONSET
ONSET: ON-GOING
ONSET: ON-GOING
ONSET: GRADUAL

## 2025-04-18 ASSESSMENT — PAIN DESCRIPTION - DESCRIPTORS
DESCRIPTORS: ACHING
DESCRIPTORS: ACHING
DESCRIPTORS: ACHING;DISCOMFORT
DESCRIPTORS: ACHING
DESCRIPTORS: ACHING

## 2025-04-18 NOTE — PLAN OF CARE
Problem: Discharge Planning  Goal: Discharge to home or other facility with appropriate resources  Outcome: Progressing  Flowsheets (Taken 4/18/2025 0845)  Discharge to home or other facility with appropriate resources: Identify barriers to discharge with patient and caregiver     Problem: Safety - Adult  Goal: Free from fall injury  Outcome: Progressing  Flowsheets (Taken 4/18/2025 0900)  Free From Fall Injury: Instruct family/caregiver on patient safety     Problem: Pain  Goal: Verbalizes/displays adequate comfort level or baseline comfort level  Outcome: Progressing  Flowsheets (Taken 4/18/2025 0352 by Vin Uriarte, RN)  Verbalizes/displays adequate comfort level or baseline comfort level:   Encourage patient to monitor pain and request assistance   Assess pain using appropriate pain scale   Implement non-pharmacological measures as appropriate and evaluate response   Consider cultural and social influences on pain and pain management   Notify Licensed Independent Practitioner if interventions unsuccessful or patient reports new pain   Administer analgesics based on type and severity of pain and evaluate response     Problem: Skin/Tissue Integrity  Goal: Skin integrity remains intact  Description: 1.  Monitor for areas of redness and/or skin breakdown2.  Assess vascular access sites hourly3.  Every 4-6 hours minimum:  Change oxygen saturation probe site4.  Every 4-6 hours:  If on nasal continuous positive airway pressure, respiratory therapy assess nares and determine need for appliance change or resting period  Outcome: Progressing  Flowsheets  Taken 4/18/2025 0900  Skin Integrity Remains Intact: Monitor for areas of redness and/or skin breakdown  Taken 4/18/2025 0845  Skin Integrity Remains Intact: Monitor for areas of redness and/or skin breakdown     Problem: Chronic Conditions and Co-morbidities  Goal: Patient's chronic conditions and co-morbidity symptoms are monitored and maintained or

## 2025-04-19 ENCOUNTER — APPOINTMENT (OUTPATIENT)
Dept: GENERAL RADIOLOGY | Age: 60
DRG: 234 | End: 2025-04-19
Attending: INTERNAL MEDICINE
Payer: COMMERCIAL

## 2025-04-19 LAB
ANION GAP SERPL CALC-SCNC: 9 MMOL/L (ref 7–16)
BUN SERPL-MCNC: 8 MG/DL (ref 8–23)
CALCIUM SERPL-MCNC: 8.4 MG/DL (ref 8.8–10.2)
CHLORIDE SERPL-SCNC: 99 MMOL/L (ref 98–107)
CO2 SERPL-SCNC: 26 MMOL/L (ref 20–29)
CREAT SERPL-MCNC: 0.58 MG/DL (ref 0.6–1.1)
GLUCOSE BLD STRIP.AUTO-MCNC: 123 MG/DL (ref 65–100)
GLUCOSE BLD STRIP.AUTO-MCNC: 128 MG/DL (ref 65–100)
GLUCOSE BLD STRIP.AUTO-MCNC: 135 MG/DL (ref 65–100)
GLUCOSE BLD STRIP.AUTO-MCNC: 158 MG/DL (ref 65–100)
GLUCOSE SERPL-MCNC: 150 MG/DL (ref 70–99)
MAGNESIUM SERPL-MCNC: 2 MG/DL (ref 1.8–2.4)
POTASSIUM SERPL-SCNC: 4.2 MMOL/L (ref 3.5–5.1)
SERVICE CMNT-IMP: ABNORMAL
SODIUM SERPL-SCNC: 134 MMOL/L (ref 136–145)

## 2025-04-19 PROCEDURE — 71045 X-RAY EXAM CHEST 1 VIEW: CPT

## 2025-04-19 PROCEDURE — 82962 GLUCOSE BLOOD TEST: CPT

## 2025-04-19 PROCEDURE — 6360000002 HC RX W HCPCS: Performed by: SURGERY

## 2025-04-19 PROCEDURE — 2140000001 HC CVICU INTERMEDIATE R&B

## 2025-04-19 PROCEDURE — 97530 THERAPEUTIC ACTIVITIES: CPT

## 2025-04-19 PROCEDURE — 80048 BASIC METABOLIC PNL TOTAL CA: CPT

## 2025-04-19 PROCEDURE — 6370000000 HC RX 637 (ALT 250 FOR IP): Performed by: PHYSICIAN ASSISTANT

## 2025-04-19 PROCEDURE — 99232 SBSQ HOSP IP/OBS MODERATE 35: CPT | Performed by: INTERNAL MEDICINE

## 2025-04-19 PROCEDURE — 2500000003 HC RX 250 WO HCPCS: Performed by: PHYSICIAN ASSISTANT

## 2025-04-19 PROCEDURE — 36415 COLL VENOUS BLD VENIPUNCTURE: CPT

## 2025-04-19 PROCEDURE — 83735 ASSAY OF MAGNESIUM: CPT

## 2025-04-19 RX ORDER — FUROSEMIDE 10 MG/ML
40 INJECTION INTRAMUSCULAR; INTRAVENOUS ONCE
Status: COMPLETED | OUTPATIENT
Start: 2025-04-19 | End: 2025-04-19

## 2025-04-19 RX ADMIN — ONDANSETRON 4 MG: 4 TABLET, ORALLY DISINTEGRATING ORAL at 05:06

## 2025-04-19 RX ADMIN — TRAMADOL HYDROCHLORIDE 100 MG: 50 TABLET, COATED ORAL at 19:56

## 2025-04-19 RX ADMIN — ACETAMINOPHEN 650 MG: 325 TABLET ORAL at 23:10

## 2025-04-19 RX ADMIN — ACETAMINOPHEN 650 MG: 325 TABLET ORAL at 17:51

## 2025-04-19 RX ADMIN — ACETAMINOPHEN 650 MG: 325 TABLET ORAL at 06:02

## 2025-04-19 RX ADMIN — Medication 1 AMPULE: at 09:31

## 2025-04-19 RX ADMIN — SODIUM CHLORIDE, PRESERVATIVE FREE 10 ML: 5 INJECTION INTRAVENOUS at 09:31

## 2025-04-19 RX ADMIN — Medication 1 AMPULE: at 19:59

## 2025-04-19 RX ADMIN — ASPIRIN 81 MG: 81 TABLET ORAL at 09:31

## 2025-04-19 RX ADMIN — FAMOTIDINE 20 MG: 20 TABLET, FILM COATED ORAL at 09:31

## 2025-04-19 RX ADMIN — AMIODARONE HYDROCHLORIDE 200 MG: 200 TABLET ORAL at 09:31

## 2025-04-19 RX ADMIN — DOCUSATE SODIUM 50 MG AND SENNOSIDES 8.6 MG 1 TABLET: 8.6; 5 TABLET, FILM COATED ORAL at 09:30

## 2025-04-19 RX ADMIN — METOPROLOL TARTRATE 12.5 MG: 25 TABLET, FILM COATED ORAL at 19:56

## 2025-04-19 RX ADMIN — POTASSIUM CHLORIDE 10 MEQ: 750 TABLET, EXTENDED RELEASE ORAL at 09:31

## 2025-04-19 RX ADMIN — TRAMADOL HYDROCHLORIDE 100 MG: 50 TABLET, COATED ORAL at 05:04

## 2025-04-19 RX ADMIN — TRAMADOL HYDROCHLORIDE 50 MG: 50 TABLET, COATED ORAL at 13:34

## 2025-04-19 RX ADMIN — POLYETHYLENE GLYCOL 3350 17 G: 17 POWDER, FOR SOLUTION ORAL at 09:30

## 2025-04-19 RX ADMIN — DOCUSATE SODIUM 50 MG AND SENNOSIDES 8.6 MG 1 TABLET: 8.6; 5 TABLET, FILM COATED ORAL at 19:56

## 2025-04-19 RX ADMIN — METOPROLOL TARTRATE 12.5 MG: 25 TABLET, FILM COATED ORAL at 09:31

## 2025-04-19 RX ADMIN — ATORVASTATIN CALCIUM 80 MG: 80 TABLET, FILM COATED ORAL at 19:56

## 2025-04-19 RX ADMIN — AMIODARONE HYDROCHLORIDE 200 MG: 200 TABLET ORAL at 19:57

## 2025-04-19 RX ADMIN — FAMOTIDINE 20 MG: 20 TABLET, FILM COATED ORAL at 19:56

## 2025-04-19 RX ADMIN — ONDANSETRON 4 MG: 4 TABLET, ORALLY DISINTEGRATING ORAL at 17:57

## 2025-04-19 RX ADMIN — FUROSEMIDE 40 MG: 40 TABLET ORAL at 09:30

## 2025-04-19 RX ADMIN — SODIUM CHLORIDE, PRESERVATIVE FREE 10 ML: 5 INJECTION INTRAVENOUS at 19:58

## 2025-04-19 RX ADMIN — ACETAMINOPHEN 650 MG: 325 TABLET ORAL at 13:34

## 2025-04-19 RX ADMIN — FUROSEMIDE 40 MG: 10 INJECTION, SOLUTION INTRAMUSCULAR; INTRAVENOUS at 13:35

## 2025-04-19 ASSESSMENT — PAIN DESCRIPTION - LOCATION
LOCATION: INCISION
LOCATION: CHEST
LOCATION: CHEST
LOCATION: INCISION

## 2025-04-19 ASSESSMENT — PAIN SCALES - GENERAL
PAINLEVEL_OUTOF10: 0
PAINLEVEL_OUTOF10: 2
PAINLEVEL_OUTOF10: 0
PAINLEVEL_OUTOF10: 0
PAINLEVEL_OUTOF10: 4
PAINLEVEL_OUTOF10: 5
PAINLEVEL_OUTOF10: 8
PAINLEVEL_OUTOF10: 0
PAINLEVEL_OUTOF10: 8
PAINLEVEL_OUTOF10: 0
PAINLEVEL_OUTOF10: 0

## 2025-04-19 ASSESSMENT — PAIN DESCRIPTION - ORIENTATION
ORIENTATION: MID
ORIENTATION: ANTERIOR
ORIENTATION: ANTERIOR
ORIENTATION: MID

## 2025-04-19 ASSESSMENT — PAIN DESCRIPTION - DESCRIPTORS
DESCRIPTORS: ACHING
DESCRIPTORS: ACHING
DESCRIPTORS: ACHING;SORE
DESCRIPTORS: ACHING

## 2025-04-19 ASSESSMENT — PAIN - FUNCTIONAL ASSESSMENT
PAIN_FUNCTIONAL_ASSESSMENT: PREVENTS OR INTERFERES WITH ALL ACTIVE AND SOME PASSIVE ACTIVITIES
PAIN_FUNCTIONAL_ASSESSMENT: PREVENTS OR INTERFERES WITH ALL ACTIVE AND SOME PASSIVE ACTIVITIES

## 2025-04-19 NOTE — PLAN OF CARE
Problem: Discharge Planning  Goal: Discharge to home or other facility with appropriate resources  4/18/2025 2003 by Saundra Puente RN  Outcome: Progressing  4/18/2025 1748 by Jusitn Lundberg RN  Outcome: Progressing  Flowsheets (Taken 4/18/2025 0845)  Discharge to home or other facility with appropriate resources: Identify barriers to discharge with patient and caregiver     Problem: Safety - Adult  Goal: Free from fall injury  4/18/2025 2003 by Saundra Puente RN  Outcome: Progressing  4/18/2025 1748 by Justin Lundberg RN  Outcome: Progressing  Flowsheets (Taken 4/18/2025 0900)  Free From Fall Injury: Instruct family/caregiver on patient safety     Problem: Pain  Goal: Verbalizes/displays adequate comfort level or baseline comfort level  4/18/2025 2003 by Saundra Puente RN  Outcome: Progressing  4/18/2025 1748 by Justin Lundberg RN  Outcome: Progressing  Flowsheets (Taken 4/18/2025 0352 by Vin Uriarte RN)  Verbalizes/displays adequate comfort level or baseline comfort level:   Encourage patient to monitor pain and request assistance   Assess pain using appropriate pain scale   Implement non-pharmacological measures as appropriate and evaluate response   Consider cultural and social influences on pain and pain management   Notify Licensed Independent Practitioner if interventions unsuccessful or patient reports new pain   Administer analgesics based on type and severity of pain and evaluate response     Problem: Skin/Tissue Integrity  Goal: Skin integrity remains intact  Description: 1.  Monitor for areas of redness and/or skin breakdown2.  Assess vascular access sites hourly3.  Every 4-6 hours minimum:  Change oxygen saturation probe site4.  Every 4-6 hours:  If on nasal continuous positive airway pressure, respiratory therapy assess nares and determine need for appliance change or resting period  4/18/2025 2003 by Saundra Puente RN  Outcome: Progressing  4/18/2025 1748 by Justin Lundberg RN  Outcome:

## 2025-04-19 NOTE — PLAN OF CARE
Problem: Discharge Planning  Goal: Discharge to home or other facility with appropriate resources  4/18/2025 2003 by Saundra Puente, RN  Outcome: Progressing   Waiting for STR approval from insurance.

## 2025-04-20 ENCOUNTER — APPOINTMENT (OUTPATIENT)
Dept: GENERAL RADIOLOGY | Age: 60
DRG: 234 | End: 2025-04-20
Attending: INTERNAL MEDICINE
Payer: COMMERCIAL

## 2025-04-20 LAB
GLUCOSE BLD STRIP.AUTO-MCNC: 127 MG/DL (ref 65–100)
GLUCOSE BLD STRIP.AUTO-MCNC: 132 MG/DL (ref 65–100)
GLUCOSE BLD STRIP.AUTO-MCNC: 138 MG/DL (ref 65–100)
GLUCOSE BLD STRIP.AUTO-MCNC: 150 MG/DL (ref 65–100)
SERVICE CMNT-IMP: ABNORMAL

## 2025-04-20 PROCEDURE — 97110 THERAPEUTIC EXERCISES: CPT

## 2025-04-20 PROCEDURE — 6370000000 HC RX 637 (ALT 250 FOR IP): Performed by: PHYSICIAN ASSISTANT

## 2025-04-20 PROCEDURE — 97530 THERAPEUTIC ACTIVITIES: CPT

## 2025-04-20 PROCEDURE — 6370000000 HC RX 637 (ALT 250 FOR IP): Performed by: SURGERY

## 2025-04-20 PROCEDURE — 82962 GLUCOSE BLOOD TEST: CPT

## 2025-04-20 PROCEDURE — 6360000002 HC RX W HCPCS: Performed by: SURGERY

## 2025-04-20 PROCEDURE — 99232 SBSQ HOSP IP/OBS MODERATE 35: CPT | Performed by: INTERNAL MEDICINE

## 2025-04-20 PROCEDURE — 2500000003 HC RX 250 WO HCPCS: Performed by: PHYSICIAN ASSISTANT

## 2025-04-20 PROCEDURE — 71045 X-RAY EXAM CHEST 1 VIEW: CPT

## 2025-04-20 PROCEDURE — 2140000001 HC CVICU INTERMEDIATE R&B

## 2025-04-20 RX ORDER — FUROSEMIDE 10 MG/ML
40 INJECTION INTRAMUSCULAR; INTRAVENOUS ONCE
Status: COMPLETED | OUTPATIENT
Start: 2025-04-20 | End: 2025-04-20

## 2025-04-20 RX ADMIN — ACETAMINOPHEN 650 MG: 325 TABLET ORAL at 18:29

## 2025-04-20 RX ADMIN — METOPROLOL TARTRATE 12.5 MG: 25 TABLET, FILM COATED ORAL at 20:49

## 2025-04-20 RX ADMIN — SODIUM CHLORIDE, PRESERVATIVE FREE 10 ML: 5 INJECTION INTRAVENOUS at 20:50

## 2025-04-20 RX ADMIN — Medication 1 AMPULE: at 20:50

## 2025-04-20 RX ADMIN — FUROSEMIDE 40 MG: 10 INJECTION, SOLUTION INTRAMUSCULAR; INTRAVENOUS at 16:40

## 2025-04-20 RX ADMIN — TRAMADOL HYDROCHLORIDE 100 MG: 50 TABLET, COATED ORAL at 04:08

## 2025-04-20 RX ADMIN — ATORVASTATIN CALCIUM 80 MG: 80 TABLET, FILM COATED ORAL at 20:49

## 2025-04-20 RX ADMIN — Medication 1 AMPULE: at 09:31

## 2025-04-20 RX ADMIN — ACETAMINOPHEN 650 MG: 325 TABLET ORAL at 12:25

## 2025-04-20 RX ADMIN — TRAMADOL HYDROCHLORIDE 100 MG: 50 TABLET, COATED ORAL at 20:49

## 2025-04-20 RX ADMIN — SODIUM CHLORIDE, PRESERVATIVE FREE 10 ML: 5 INJECTION INTRAVENOUS at 09:32

## 2025-04-20 RX ADMIN — ASPIRIN 81 MG: 81 TABLET ORAL at 09:31

## 2025-04-20 RX ADMIN — METOPROLOL TARTRATE 12.5 MG: 25 TABLET, FILM COATED ORAL at 09:31

## 2025-04-20 RX ADMIN — DOCUSATE SODIUM 50 MG AND SENNOSIDES 8.6 MG 1 TABLET: 8.6; 5 TABLET, FILM COATED ORAL at 09:31

## 2025-04-20 RX ADMIN — Medication 3 MG: at 22:09

## 2025-04-20 RX ADMIN — DOCUSATE SODIUM 50 MG AND SENNOSIDES 8.6 MG 1 TABLET: 8.6; 5 TABLET, FILM COATED ORAL at 20:49

## 2025-04-20 RX ADMIN — TRAMADOL HYDROCHLORIDE 50 MG: 50 TABLET, COATED ORAL at 10:05

## 2025-04-20 RX ADMIN — FAMOTIDINE 20 MG: 20 TABLET, FILM COATED ORAL at 20:49

## 2025-04-20 RX ADMIN — POLYETHYLENE GLYCOL 3350 17 G: 17 POWDER, FOR SOLUTION ORAL at 09:32

## 2025-04-20 RX ADMIN — AMIODARONE HYDROCHLORIDE 200 MG: 200 TABLET ORAL at 09:31

## 2025-04-20 RX ADMIN — FAMOTIDINE 20 MG: 20 TABLET, FILM COATED ORAL at 09:31

## 2025-04-20 RX ADMIN — ACETAMINOPHEN 650 MG: 325 TABLET ORAL at 06:26

## 2025-04-20 RX ADMIN — AMIODARONE HYDROCHLORIDE 200 MG: 200 TABLET ORAL at 20:50

## 2025-04-20 ASSESSMENT — PAIN DESCRIPTION - ORIENTATION
ORIENTATION: ANTERIOR
ORIENTATION: ANTERIOR
ORIENTATION: MID
ORIENTATION: ANTERIOR
ORIENTATION: MID

## 2025-04-20 ASSESSMENT — PAIN DESCRIPTION - DESCRIPTORS
DESCRIPTORS: ACHING
DESCRIPTORS: ACHING;SORE
DESCRIPTORS: ACHING

## 2025-04-20 ASSESSMENT — PAIN SCALES - GENERAL
PAINLEVEL_OUTOF10: 0
PAINLEVEL_OUTOF10: 2
PAINLEVEL_OUTOF10: 8
PAINLEVEL_OUTOF10: 6
PAINLEVEL_OUTOF10: 4
PAINLEVEL_OUTOF10: 9
PAINLEVEL_OUTOF10: 2
PAINLEVEL_OUTOF10: 4

## 2025-04-20 ASSESSMENT — PAIN DESCRIPTION - LOCATION
LOCATION: INCISION
LOCATION: CHEST
LOCATION: INCISION

## 2025-04-20 NOTE — PLAN OF CARE
Problem: Discharge Planning  Goal: Discharge to home or other facility with appropriate resources  4/20/2025 1919 by Saundra Puente RN  Outcome: Progressing  4/20/2025 1149 by Fadia Cuadra RN  Outcome: Progressing  Flowsheets (Taken 4/20/2025 1149)  Discharge to home or other facility with appropriate resources: Identify barriers to discharge with patient and caregiver  Note: Patient will need strong reinforcement with [employer] education      Problem: Safety - Adult  Goal: Free from fall injury  4/20/2025 1919 by Saundra Puente RN  Outcome: Progressing  4/20/2025 1149 by Fadia Cuadra RN  Flowsheets (Taken 4/19/2025 0944)  Free From Fall Injury: Based on caregiver fall risk screen, instruct family/caregiver to ask for assistance with transferring infant if caregiver noted to have fall risk factors  Note: Using Chair alarm      Problem: Pain  Goal: Verbalizes/displays adequate comfort level or baseline comfort level  4/20/2025 1919 by Saundra Punete RN  Outcome: Progressing  4/20/2025 1149 by Fadia Cuadra RN  Flowsheets (Taken 4/18/2025 0352 by Vin Uriarte RN)  Verbalizes/displays adequate comfort level or baseline comfort level:   Encourage patient to monitor pain and request assistance   Assess pain using appropriate pain scale   Implement non-pharmacological measures as appropriate and evaluate response   Consider cultural and social influences on pain and pain management   Notify Licensed Independent Practitioner if interventions unsuccessful or patient reports new pain   Administer analgesics based on type and severity of pain and evaluate response  Note: Using Tylenol and Tramadol      Problem: Skin/Tissue Integrity  Goal: Skin integrity remains intact  Description: 1.  Monitor for areas of redness and/or skin breakdown2.  Assess vascular access sites hourly3.  Every 4-6 hours minimum:  Change oxygen saturation probe site4.  Every 4-6 hours:  If on nasal continuous positive airway pressure,

## 2025-04-20 NOTE — PLAN OF CARE
Problem: Discharge Planning  Goal: Discharge to home or other facility with appropriate resources  Outcome: Progressing     Problem: Safety - Adult  Goal: Free from fall injury  4/19/2025 2012 by Saundra Puente RN  Outcome: Progressing  4/19/2025 0944 by Fadia Cuadra RN  Flowsheets (Taken 4/19/2025 0944)  Free From Fall Injury: Based on caregiver fall risk screen, instruct family/caregiver to ask for assistance with transferring infant if caregiver noted to have fall risk factors  Note: Chair alarm activated     Problem: Pain  Goal: Verbalizes/displays adequate comfort level or baseline comfort level  4/19/2025 2012 by Saundra Puente RN  Outcome: Progressing  4/19/2025 0944 by Fadia Cuadra RN  Flowsheets (Taken 4/18/2025 0352 by Vin Uriarte RN)  Verbalizes/displays adequate comfort level or baseline comfort level:   Encourage patient to monitor pain and request assistance   Assess pain using appropriate pain scale   Implement non-pharmacological measures as appropriate and evaluate response   Consider cultural and social influences on pain and pain management   Notify Licensed Independent Practitioner if interventions unsuccessful or patient reports new pain   Administer analgesics based on type and severity of pain and evaluate response  Note: Using scheduled tylenol and Tramadol for pain control     Problem: Skin/Tissue Integrity  Goal: Skin integrity remains intact  Description: 1.  Monitor for areas of redness and/or skin breakdown2.  Assess vascular access sites hourly3.  Every 4-6 hours minimum:  Change oxygen saturation probe site4.  Every 4-6 hours:  If on nasal continuous positive airway pressure, respiratory therapy assess nares and determine need for appliance change or resting period  4/19/2025 2012 by Saundra Puente RN  Outcome: Progressing  4/19/2025 0944 by Fadia Cuadra, RN  Flowsheets (Taken 4/18/2025 0900 by Justin Lundberg RN)  Skin Integrity Remains Intact: Monitor for areas of

## 2025-04-21 VITALS
DIASTOLIC BLOOD PRESSURE: 75 MMHG | HEART RATE: 57 BPM | BODY MASS INDEX: 26.98 KG/M2 | WEIGHT: 128.53 LBS | TEMPERATURE: 98.1 F | OXYGEN SATURATION: 96 % | HEIGHT: 58 IN | SYSTOLIC BLOOD PRESSURE: 117 MMHG | RESPIRATION RATE: 20 BRPM

## 2025-04-21 LAB
GLUCOSE BLD STRIP.AUTO-MCNC: 122 MG/DL (ref 65–100)
GLUCOSE BLD STRIP.AUTO-MCNC: 163 MG/DL (ref 65–100)
SERVICE CMNT-IMP: ABNORMAL
SERVICE CMNT-IMP: ABNORMAL

## 2025-04-21 PROCEDURE — 99232 SBSQ HOSP IP/OBS MODERATE 35: CPT | Performed by: INTERNAL MEDICINE

## 2025-04-21 PROCEDURE — 6370000000 HC RX 637 (ALT 250 FOR IP): Performed by: PHYSICIAN ASSISTANT

## 2025-04-21 PROCEDURE — 36415 COLL VENOUS BLD VENIPUNCTURE: CPT

## 2025-04-21 PROCEDURE — 2500000003 HC RX 250 WO HCPCS: Performed by: PHYSICIAN ASSISTANT

## 2025-04-21 PROCEDURE — 82962 GLUCOSE BLOOD TEST: CPT

## 2025-04-21 PROCEDURE — 97530 THERAPEUTIC ACTIVITIES: CPT

## 2025-04-21 RX ORDER — CLOPIDOGREL BISULFATE 75 MG/1
75 TABLET ORAL DAILY
Qty: 30 TABLET | Refills: 11 | Status: SHIPPED | OUTPATIENT
Start: 2025-04-21

## 2025-04-21 RX ORDER — ASPIRIN 81 MG/1
81 TABLET ORAL DAILY
COMMUNITY
Start: 2025-04-22

## 2025-04-21 RX ORDER — ATORVASTATIN CALCIUM 80 MG/1
80 TABLET, FILM COATED ORAL NIGHTLY
Qty: 90 TABLET | Refills: 3 | Status: SHIPPED | OUTPATIENT
Start: 2025-04-21

## 2025-04-21 RX ORDER — TRAMADOL HYDROCHLORIDE 50 MG/1
50 TABLET ORAL EVERY 6 HOURS PRN
Qty: 25 TABLET | Refills: 0 | Status: SHIPPED | OUTPATIENT
Start: 2025-04-21 | End: 2025-04-26

## 2025-04-21 RX ORDER — LOSARTAN POTASSIUM 25 MG/1
25 TABLET ORAL DAILY
Qty: 30 TABLET | Refills: 5 | Status: SHIPPED | OUTPATIENT
Start: 2025-04-21

## 2025-04-21 RX ORDER — METOPROLOL TARTRATE 25 MG/1
12.5 TABLET, FILM COATED ORAL 2 TIMES DAILY
Qty: 60 TABLET | Refills: 3 | Status: SHIPPED | OUTPATIENT
Start: 2025-04-21

## 2025-04-21 RX ORDER — ACETAMINOPHEN 325 MG/1
650 TABLET ORAL EVERY 6 HOURS
COMMUNITY
Start: 2025-04-21

## 2025-04-21 RX ADMIN — SODIUM CHLORIDE, PRESERVATIVE FREE 10 ML: 5 INJECTION INTRAVENOUS at 08:26

## 2025-04-21 RX ADMIN — ASPIRIN 81 MG: 81 TABLET ORAL at 08:21

## 2025-04-21 RX ADMIN — AMIODARONE HYDROCHLORIDE 200 MG: 200 TABLET ORAL at 08:22

## 2025-04-21 RX ADMIN — ACETAMINOPHEN 650 MG: 325 TABLET ORAL at 05:58

## 2025-04-21 RX ADMIN — INSULIN LISPRO 2 UNITS: 100 INJECTION, SOLUTION INTRAVENOUS; SUBCUTANEOUS at 11:18

## 2025-04-21 RX ADMIN — TRAMADOL HYDROCHLORIDE 100 MG: 50 TABLET, COATED ORAL at 03:08

## 2025-04-21 RX ADMIN — Medication 1 AMPULE: at 08:21

## 2025-04-21 RX ADMIN — ONDANSETRON 4 MG: 4 TABLET, ORALLY DISINTEGRATING ORAL at 05:35

## 2025-04-21 RX ADMIN — ACETAMINOPHEN 650 MG: 325 TABLET ORAL at 11:18

## 2025-04-21 RX ADMIN — DOCUSATE SODIUM 50 MG AND SENNOSIDES 8.6 MG 1 TABLET: 8.6; 5 TABLET, FILM COATED ORAL at 08:22

## 2025-04-21 RX ADMIN — ACETAMINOPHEN 650 MG: 325 TABLET ORAL at 00:33

## 2025-04-21 RX ADMIN — METOPROLOL TARTRATE 12.5 MG: 25 TABLET, FILM COATED ORAL at 08:22

## 2025-04-21 RX ADMIN — FAMOTIDINE 20 MG: 20 TABLET, FILM COATED ORAL at 08:21

## 2025-04-21 ASSESSMENT — PAIN SCALES - GENERAL
PAINLEVEL_OUTOF10: 0
PAINLEVEL_OUTOF10: 9
PAINLEVEL_OUTOF10: 0

## 2025-04-21 ASSESSMENT — PAIN DESCRIPTION - LOCATION: LOCATION: INCISION

## 2025-04-21 ASSESSMENT — PAIN DESCRIPTION - DESCRIPTORS: DESCRIPTORS: ACHING

## 2025-04-21 ASSESSMENT — PAIN DESCRIPTION - ORIENTATION: ORIENTATION: MID

## 2025-04-21 ASSESSMENT — PAIN - FUNCTIONAL ASSESSMENT: PAIN_FUNCTIONAL_ASSESSMENT: PREVENTS OR INTERFERES WITH ALL ACTIVE AND SOME PASSIVE ACTIVITIES

## 2025-04-21 NOTE — CARE COORDINATION
Patient has discharge orders to go home today with home health care. CM spoke to patient at bedside. Patient is now in agreement to go home with home health care.     CM gave patient home health care choice list. Patient chose [facility]. CM sent referral accordingly for PT and RN services. CM informed [facility] that patient will be discharged today.     No CM needs voiced or noted.     ASSESSMENT NOTE    Attending Physician: Nba Garcia MD  Admit Problem: Chest pain [R07.9]  CAD in native artery [I25.10]  CAD, multiple vessel [I25.10]  Date/Time of Admission: 4/10/2025 11:30 PM  Problem List:  Patient Active Problem List   Diagnosis    Anxiety    Dysthymia (or depressive neurosis)    Migraine with aura    Osteoarthritis    Hyperlipidemia    Allergic rhinitis    GERD (gastroesophageal reflux disease)    Chest pain    Type 2 diabetes mellitus with circulatory disorder, without long-term current use of insulin (HCC)    Cocaine use    NSTEMI (non-ST elevated myocardial infarction) ([facility])    CAD in native artery    CAD, multiple vessel    S/P CABG x 3    Hypoxemia    Atelectasis    Vitamin D deficiency    Pleural effusion    Mixed hyperlipidemia       Service Assessment  Patient Orientation Alert and Oriented   Cognition Alert   History Provided By Patient   Primary Caregiver Self   Accompanied By/Relationship     Support Systems Parent, Family Members, [place], Friends/Neighbors   Patient's Healthcare Decision Maker is: Legal Next of Kin   PCP Verified by CM No   Last Visit to PCP     Prior Functional Level Independent in ADLs/IADLs   Current Functional Level Independent in ADLs/IADLs   Can patient return to prior living arrangement Yes   Ability to make needs known: Good   Family able to assist with home care needs: Yes   Would you like for me to discuss the discharge plan with any other family members/significant others, and if so, who? No   Financial

## 2025-04-21 NOTE — CARE COORDINATION
Patient has been accepted for [facility] health care services. CM informed [facility] that patient will be discharged today.

## 2025-04-21 NOTE — PROGRESS NOTES
[facility] CARDIOLOGY PROGRESS NOTE           4/17/2025 11:35 AM    Admit Date: 4/10/2025      Subjective:   No complaints of any true angina, no worsening dyspnea today.  Rhythm has been stableOvernight.  Weight charted at 63.5 kg - 139 pounds  Intake versus output-charted net -367 cc with 1 L of urine output.      ROS:  Cardiovascular:  As noted above    Objective:      Vitals:    04/17/25 0314 04/17/25 0445 04/17/25 0730 04/17/25 1115   BP: 111/76  136/63 123/76   Pulse: 58  67 67   Resp: 18  19 22   Temp: 97.1 °F (36.2 °C)  97.2 °F (36.2 °C) 99 °F (37.2 °C)   TempSrc: Temporal  Temporal Temporal   SpO2: 96%  95% 94%   Weight:  63.5 kg (139 lb 15.9 oz)     Height:           Physical Exam:  General-No Acute Distress  Neck- supple, no JVD  CV- regular rate and rhythm no MRG  Lung- clear bilaterally  Abd- soft, nontender, nondistended  Ext- no edema bilaterally.  Skin- warm and dry    Data Review:     Lab Results   Component Value Date/Time     04/17/2025 03:12 AM    K 4.4 04/17/2025 03:12 AM    CL 99 04/17/2025 03:12 AM    CO2 25 04/17/2025 03:12 AM    BUN 15 04/17/2025 03:12 AM    CREATININE 0.60 04/17/2025 03:12 AM    GLUCOSE 127 04/17/2025 03:12 AM    CALCIUM 8.1 04/17/2025 03:12 AM         Lab Results   Component Value Date    WBC 12.8 (H) 04/17/2025    HGB 10.2 (L) 04/17/2025    HCT 30.3 (L) 04/17/2025    MCV 95.3 04/17/2025     (L) 04/17/2025      Hemoglobin A1C   Date Value Ref Range Status   04/12/2025 6.6 (H) 0 - 5.6 % Final     Comment:     Reference Range  Normal       <5.7%  Prediabetes  5.7-6.4%  Diabetes     >6.4%        No results found for: \"TSH\", \"TSHFT4\", \"TSHELE\", \"BTU5XHE\", \"TSHHS\"   Lab Results   Component Value Date    CHOL 217 (H) 04/11/2025    TRIG 102 04/11/2025    HDL 49 04/11/2025     (H) 04/11/2025    VLDL 20 04/11/2025    CHOLHDLRATIO 4.4 04/11/2025        04/10/25    ECHO (TTE) COMPLETE (PRN CONTRAST/BUBBLE/STRAIN/3D) 04/11/2025  1:52 PM 
                        [facility] CARDIOLOGY PROGRESS NOTE           4/16/2025 8:09 AM    Admit Date: 4/10/2025         Subjective: extubated and now off pressor support.  Doing well.    ROS:  Cardiovascular:  As noted above    Objective:      Vitals:    04/16/25 0600 04/16/25 0615 04/16/25 0630 04/16/25 0714   BP: (!) 117/57 (!) 109/53 (!) 126/59 (!) 121/55   Pulse: 76 75 79 75   Resp: 28 27 21 25   Temp:    99 °F (37.2 °C)   TempSrc:    Oral   SpO2: 96% 94% 94% 100%   Weight: 64.6 kg (142 lb 6.7 oz)      Height:           On telemetry:sr      Physical Exam:  General: Well Developed, Well Nourished, No Acute Distress, Alert & Oriented x 3, Appropriate mood  Neck: supple, no JVD  Heart: S1S2 with RRR without murmurs or gallops  Lungs: Clear throughout auscultation bilaterally without adventitious sounds  Abd: soft, nontender, nondistended, with good bowel sounds  Ext: no edema bilaterally  Skin: warm and dry      Data Review:   Recent Labs     04/15/25  0324 04/15/25  1033 04/15/25  1603 04/16/25  0412    137 136 134*   K 3.7 4.1 3.6 4.4   MG 2.1 2.2 1.9 2.2   BUN 7* 7* 6* 8   WBC 9.2 7.1 11.9* 13.3*   HGB 7.0* 7.7* 10.5* 10.2*   HCT 20.1* 21.8* 30.0* 30.4*   PLT 83* 84* 113* 107*   INR 1.4 1.2  --   --        No results for input(s): \"TNIPOC\" in the last 72 hours.    Invalid input(s): \"TROIQ\"        Assessment/Plan:     Principal Problem:    S/P CABG x 3  Active Problems:    Chest pain    Elevated glucose level    Cocaine use    CAD in native artery    Hyperlipidemia    GERD (gastroesophageal reflux disease)    NSTEMI (non-ST elevated myocardial infarction) (HCC)    CAD, multiple vessel    Hypoxemia    Atelectasis  Resolved Problems:    * No resolved hospital problems. *    A/P  1) CAD - post cabg continue supportive care, continue asa/statin  2) Blood loss anemia - improved post xfusion  3) Presurgery echo showed normal LV function - continue low dose metoprolol 12.5 mg BID    Chino Katz MD  4/16/2025 8:09 
                        [facility] CARDIOLOGY PROGRESS NOTE           4/21/2025 8:50 AM    Admit Date: 4/10/2025         Subjective: Doing well, denies CP or SOB.    ROS:  Cardiovascular:  As noted above    Objective:      Vitals:    04/21/25 0030 04/21/25 0308 04/21/25 0315 04/21/25 0709   BP: (!) 116/59 119/64  119/71   Pulse: 55 56  59   Resp: 18 18  18   Temp: 98 °F (36.7 °C) 98.2 °F (36.8 °C)  97.5 °F (36.4 °C)   TempSrc: Oral Oral  Oral   SpO2:  92%  94%   Weight:   58.3 kg (128 lb 8.5 oz)    Height:               Physical Exam:  General: Well Developed, Well Nourished, No Acute Distress, Alert & Oriented x 3, Appropriate mood  Neck: supple, no JVD  Heart: S1S2 with RRR without murmurs or gallops  Lungs: Clear throughout auscultation bilaterally without adventitious sounds  Abd: soft, nontender, nondistended, with good bowel sounds  Ext: no edema bilaterally  Skin: warm and dry      Data Review:   Recent Labs     04/19/25  0402   *   K 4.2   MG 2.0   BUN 8       No results for input(s): \"TNIPOC\" in the last 72 hours.    Invalid input(s): \"TROIQ\"      Assessment/Plan:     Principal Problem:    S/P CABG x 3  Active Problems:    Type 2 diabetes mellitus with circulatory disorder, without long-term current use of insulin ([facility])    Cocaine use    Hyperlipidemia    GERD (gastroesophageal reflux disease)    NSTEMI (non-ST elevated myocardial infarction) ([facility])    CAD, multiple vessel    Hypoxemia    Atelectasis    Pleural effusion    Mixed hyperlipidemia  Resolved Problems:    * No resolved hospital problems. *    A/P  1) CAD - s/p CABG on 4/14/2025 with LIMA to LAD, SVG to OM and SVG to PDA  -on 4/15/2025-CT surgery took him back to the OR and evacuated an intrathoracic hematoma.  -Continue aspirin/high intensity statin with atorvastatin 80 mg daily  - Preop echocardiogram with preserved EF.  - Presenting ACS (hs trop ~600) and plan DAPT for 1 year; continue current aspirin 81 mg daily with addition of P2Y12 
                        [facility] CARDIOLOGY PROGRESS NOTE           4/18/2025 8:21 AM    Admit Date: 4/10/2025         Subjective: Doing well now awaiting rehab placement.    ROS:  Cardiovascular:  As noted above    Objective:      Vitals:    04/18/25 0631 04/18/25 0632 04/18/25 0633 04/18/25 0713   BP:    107/63   Pulse: 61 63 62 60   Resp:    21   Temp:    97.9 °F (36.6 °C)   TempSrc:    Temporal   SpO2:    92%   Weight:       Height:           Physical Exam:  General: Well Developed, Well Nourished, No Acute Distress, Alert & Oriented x 3, Appropriate mood  Neck: supple, no JVD  Heart: S1S2 with RRR without murmurs or gallops  Lungs: Clear throughout auscultation bilaterally without adventitious sounds  Abd: soft, nontender, nondistended, with good bowel sounds  Ext: no edema bilaterally  Skin: warm and dry      Data Review:   Recent Labs     04/15/25  1033 04/15/25  1603 04/16/25  0412 04/17/25  0312 04/18/25  0509      < > 134* 129*  --    K 4.1   < > 4.4 4.4 4.1   MG 2.2   < > 2.2 2.2 2.0   BUN 7*   < > 8 15  --    WBC 7.1   < > 13.3* 12.8*  --    HGB 7.7*   < > 10.2* 10.2*  --    HCT 21.8*   < > 30.4* 30.3*  --    PLT 84*   < > 107* 136*  --    INR 1.2  --   --   --   --     < > = values in this interval not displayed.       No results for input(s): \"TNIPOC\" in the last 72 hours.    Invalid input(s): \"TROIQ\"      Assessment/Plan:     Principal Problem:    S/P CABG x 3  Active Problems:    Type 2 diabetes mellitus with circulatory disorder, without long-term current use of insulin (HCC)    Cocaine use    Hyperlipidemia    GERD (gastroesophageal reflux disease)    NSTEMI (non-ST elevated myocardial infarction) ([facility])    CAD, multiple vessel    Hypoxemia    Atelectasis    Pleural effusion    Mixed hyperlipidemia  Resolved Problems:    * No resolved hospital problems. *    A/P  1) CAD - post cabg continue supportive care, continue asa/statin  2) Blood loss anemia - stable  3) Presurgery echo showed normal LV 
                       [facility] CARDIOLOGY PROGRESS NOTE           4/13/2025 8:49 AM    Admit Date: 4/10/2025    Admit Diagnosis: Chest pain [R07.9]  CAD in native artery [I25.10]      Subjective:   No complaints this AM, no chest pain or shortness of breath    Interval History: (History of pertinent interval events obtained from nursing staff)    ROS:  GEN:  No fever or chills  Cardiovascular:  As noted above  Pulmonary:  As noted above  Neuro:  No new focal motor or sensory loss      Objective:     Vitals:    04/12/25 1959 04/12/25 2346 04/13/25 0421 04/13/25 0803   BP: (!) 105/59 102/66 103/67 (!) 87/61   Pulse: 80 72 72 69   Resp: 18 18 18 16   Temp: 98.1 °F (36.7 °C) 97.7 °F (36.5 °C) 97.7 °F (36.5 °C) 98.8 °F (37.1 °C)   TempSrc: Oral Oral Oral Temporal   SpO2: 95% 96% 95% 95%   Weight:       Height:           Physical Exam:  General-Well Developed, Well Nourished, No Acute Distress, Alert & Oriented x 3, appropriate mood.  Neck- supple, no JVD  CV- regular rate and rhythm no MRG  Lung- clear bilaterally  Abd- soft, nontender, nondistended  Ext- no edema bilaterally.  Skin- warm and dry    Current Facility-Administered Medications   Medication Dose Route Frequency    0.9 % sodium chloride infusion   IntraVENous Continuous    sodium chloride flush 0.9 % injection 5-40 mL  5-40 mL IntraVENous 2 times per day    sodium chloride flush 0.9 % injection 5-40 mL  5-40 mL IntraVENous PRN    potassium chloride (KLOR-CON M) extended release tablet 40 mEq  40 mEq Oral PRN    Or    potassium bicarb-citric acid (EFFER-K) effervescent tablet 40 mEq  40 mEq Oral PRN    Or    potassium chloride 10 mEq/100 mL IVPB (Peripheral Line)  10 mEq IntraVENous PRN    magnesium sulfate 2000 mg in 50 mL IVPB premix  2,000 mg IntraVENous PRN    ondansetron (ZOFRAN) injection 4 mg  4 mg IntraVENous Q4H PRN    acetaminophen (TYLENOL) tablet 650 mg  650 mg Oral Q6H PRN    Or    acetaminophen (TYLENOL) suppository 650 mg  650 mg Rectal Q6H PRN 
              [facility] CARDIOLOGY PROGRESS NOTE           4/19/2025 10:16 AM    Admit Date: 4/10/2025      Subjective:     No overnight events.  Some incisional chest discomfort.  In sinus rhythm on telemetry.    ROS:  Cardiovascular:  As noted above    Objective:      Vitals:    04/18/25 2325 04/19/25 0244 04/19/25 0350 04/19/25 0712   BP: 132/70  126/66 (!) 141/79   Pulse: 54  55 60   Resp: 18  16 16   Temp: 98.1 °F (36.7 °C)  97.7 °F (36.5 °C) 97.7 °F (36.5 °C)   TempSrc: Oral  Oral Oral   SpO2: 91%  93% 92%   Weight:  61.5 kg (135 lb 9.3 oz)     Height:           Physical Exam:  General-No Acute Distress  Neck- supple, no JVD  CV- regular rate and rhythm no MRG  Lung- clear bilaterally  Abd- soft, nontender, nondistended  Ext- no edema bilaterally.  Skin- warm and dry    Data Review:   Recent Labs     04/17/25  0312 04/18/25  0509 04/19/25  0402   *  --  134*   K 4.4 4.1 4.2   MG 2.2 2.0 2.0   BUN 15  --  8   WBC 12.8*  --   --    HGB 10.2*  --   --    HCT 30.3*  --   --    *  --   --        Assessment/Plan:     Principal Problem:    S/P CABG x 3  -S/p CABG on 4/14/2025 with LIMA to LAD, SVG to OM and SVG to PDA  -on 4/15/2025-CT surgery took him back to the OR and evacuated an intrathoracic hematoma.  -Continue aspirin/high intensity statin with atorvastatin 80 mg daily, low-dose beta-blocker therapy, titrate as needed  - Preop echocardiogram with preserved EF.  - Presenting ACS (hs trop ~600) and plan DAPT for 1 year; continue current aspirin 81 mg daily with addition of P2Y12 inhibitor when okay from a surgical standpoint.    Active Problems:    Type 2 diabetes mellitus with circulatory disorder, without long-term current use of insulin (HCC)  - Ideally consideration for SGLT2 inhibitor/GLP-1 agonist and reassess prior to discharge.  Noted hemoglobin A1c of 6.6.      Cocaine use  - Needs cessation; states last use about a couple weeks ago.      Hyperlipidemia  - Continue current high intensity 
             Today's Date: 4/19/2025  Date of Admission: 4/10/2025    Chart Reviewed.  Subjective:     POD4 CABG, patient is resting in her chair, poor IS effort ~500    Medications Reviewed.    Objective:     Vitals:    04/18/25 2325 04/19/25 0244 04/19/25 0350 04/19/25 0712   BP: 132/70  126/66 (!) 141/79   Pulse: 54  55 60   Resp: 18  16 16   Temp: 98.1 °F (36.7 °C)  97.7 °F (36.5 °C) 97.7 °F (36.5 °C)   TempSrc: Oral  Oral Oral   SpO2: 91%  93% 92%   Weight:  61.5 kg (135 lb 9.3 oz)     Height:           Intake and Output  Current Shift: 04/19 0701 - 04/19 1900  In: 120 [P.O.:120]  Out: -    Last 3 Shifts: 04/17 1901 - 04/19 0700  In: 1945 [P.O.:1945]  Out: 3200 [Urine:3200]    Physical Exam  Physical Exam:  General: Well Developed, Well Nourished, No Acute Distress  HEENT: pupils equal and round, no abnormalities noted  Neck: supple, no JVD  Heart: perfusing well, normal heart rate  Lungs: oxygenating well on room air,   Abd: soft, nontender, nondistended,  Ext: warm, some pitting edema  Skin: warm and dry  Psychiatric: Normal mood and affect  Neurologic: Alert and oriented X 3    LAB  Recent Labs     04/17/25  0312   WBC 12.8*   HGB 10.2*   HCT 30.3*   *     Recent Labs     04/17/25  0312 04/18/25  0509 04/19/25  0402   *  --  134*   K 4.4 4.1 4.2   CL 99  --  99   CO2 25  --  26   BUN 15  --  8   MG 2.2 2.0 2.0     No results for input(s): \"PH\", \"PCO2\", \"PO2\", \"HCO3\" in the last 72 hours.    Assessment/Plan:      POD4 CABG  Encourage IS  Mobilize with PT  Extra 40mg of furosemide today  Dispo pending insurance approval for SNF    Please message with questions or concerns      Vazquez Go MD  
           Pulmonary CV progress Note: 4/15/2025        Deb Quijano                                                     Admission Date: 4/10/2025     The patient's chart is reviewed and the patient is discussed with the staff.    Background: 59 y.o. y/o female  has no past medical history on file. S/P CABG x 3; 1 Day Post-Op. Denies smoking, denies pulmonary history. Did do cocaine several days prior to admission, but was not regularly doing cocaine for years.     Subjective:     Extubated last night to AirVo, weaned to 3L, still 100% sat. Hg dropped and CXR shows retained hemotoma in chest on left. Hard of hearing on left, better on right. Small amount of neosynephrine, better than overnight when was on 3 pressors.     Review of Systems: Comprehensive ROS negative except in HPI  Objective:   Blood pressure (!) 139/50, pulse 66, temperature 99.9 °F (37.7 °C), resp. rate 25, height 1.473 m (4' 10\"), weight 57.3 kg (126 lb 6.4 oz), SpO2 100%.   Intake/Output Summary (Last 24 hours) at 4/15/2025 0740  Last data filed at 4/15/2025 0722  Gross per 24 hour   Intake 7228.66 ml   Output 4740 ml   Net 2488.66 ml     Physical Exam:          Constitutional:  Awake, calm  EENMT:  Sclera clear, pupils equal  Respiratory: clear, some cough  Cardiovascular:  RRR with no M,G,R;  Gastrointestinal:  soft; + bowel sounds present  Musculoskeletal:  warm with no cyanosis, no lower extremity edema.   SKIN:  no jaundice or ecchymosis   Neurologic:  moving all extremities, fluent speech  Psychiatric:  calm    CXR:  stable left hemtoma in thorax, chest tube in place with output, worse from immediate post op, stable from last night        LINES:   Chest Tube Left Pleural 1 (Active)       Chest Tube  Mediastinal 2 (Active)       Urinary Catheter 04/14/25 2 Way;Hobson-Temperature (Active)     Introducer 04/14/25 (Active)       Arterial Line 04/14/25 Right Radial (Active)       CVC  04/14/25 Right (Active)     DRIPS:   Dose (mcg/kg/hr) 
           Pulmonary CV progress Note: 4/16/2025        Deb Quijano                                                     Admission Date: 4/10/2025     The patient's chart is reviewed and the patient is discussed with the staff.    Background: 59 y.o. y/o female  has no past medical history on file. S/P CABG x 3; 1 Day Post-Op. Denies smoking, denies pulmonary history. Did do cocaine several days prior to admission, but was not regularly doing cocaine for years.     Subjective:     Returned to OR yesterday for bleeding in chest, had left mammary bleeder cauterized. Returned to CVICU extubated. Off pressors. Oriented x 4, but some odd behavior/complaints.     Review of Systems: Comprehensive ROS negative except in HPI  Objective:   Blood pressure (!) 121/55, pulse 75, temperature 99 °F (37.2 °C), temperature source Oral, resp. rate 25, height 1.473 m (4' 10\"), weight 64.6 kg (142 lb 6.7 oz), SpO2 100%.   Intake/Output Summary (Last 24 hours) at 4/16/2025 0833  Last data filed at 4/16/2025 0714  Gross per 24 hour   Intake 1465.29 ml   Output 1543 ml   Net -77.71 ml     Physical Exam:          Constitutional:  Awake, calm  EENMT:  Sclera clear, pupils equal  Respiratory: clear, some cough  Cardiovascular:  RRR with no M,G,R;  Gastrointestinal:  soft; + bowel sounds present  Musculoskeletal:  warm with no cyanosis, no lower extremity edema.   SKIN:  no jaundice or ecchymosis   Neurologic:  moving all extremities, fluent speech  Psychiatric:  calm    CXR: 4/16: improved left effusion, chest tube in place          LINES:   Chest Tube Left Pleural 1 (Active)       Chest Tube  Mediastinal 2 (Active)       Urinary Catheter 04/14/25 2 Way;Hobson-Temperature (Active)     Introducer 04/14/25 (Active)       CVC  04/14/25 Right (Active)     DRIPS:   Dose (mcg/kg/hr) Dexmedetomidine: 0 mcg/kg/hr  Dose (mcg/min) Epinephrine: 0 mcg/min (MD aware)  Dose (mcg/min) Norepinephrine: 0 mcg/min  Dose (mcg/kg/min) Phenylephrine : 0 
           Pulmonary CV progress Note: 4/17/2025        Deb Quijano                                                     Admission Date: 4/10/2025     The patient's chart is reviewed and the patient is discussed with the staff.    Background: 59 y.o. y/o female  S/P CABG x 3; 2 Days Post-Op.   Denies history of tobacco use or pulmonary problems. Returned to OR 4/15 for bleeding in chest, had left mammary bleeder cauterized.  Moved to step-down 4/16.     PMH:   Cocaine abuse, CAD in native artery, hyperlipidemia, GERD, NSTEMI, CAD, s/p CABG x 3, anxiety, dysthymia, migraine with aura, osteoarthritis, allergic rhinitis, vitamin D deficiency.    Subjective/Objective:   Pt in recliner. On room air; SpO2 95 %. Denies cough and shortness of breath. Says she feels generally weak. Pulling about 500mL on IS. Appears tired.        Review of Systems: Comprehensive ROS negative except in HPI  Objective:   Blood pressure 111/76, pulse 58, temperature 97.1 °F (36.2 °C), temperature source Temporal, resp. rate 18, height 1.473 m (4' 10\"), weight 63.5 kg (139 lb 15.9 oz), SpO2 96%.   Intake/Output Summary (Last 24 hours) at 4/17/2025 0701  Last data filed at 4/17/2025 0509  Gross per 24 hour   Intake 500 ml   Output 1107 ml   Net -607 ml     Physical Exam:          Constitutional:  awake, calm, no acute distress  EENMT:  sclera clear, pupils equal  Respiratory: Crackles/diminished, no coughing  Cardiovascular:  RRR with no M,G,R;  Musculoskeletal:  warm with no cyanosis, no lower extremity edema.   SKIN:  no jaundice or ecchymosis   Neurologic:  moving all extremities, fluent speech  Psychiatric:  calm    CXR:   4/17/25 4/16: improved left effusion, chest tube in place    04/15/25     04/14/25    04/15/25     04/14/25      LINES:        Pacing Wires (Active)       Recent Labs     04/14/25  2215 04/15/25  0324 04/15/25  1033 04/15/25  1603 04/16/25  0412 04/17/25  0312   WBC  --  9.2 7.1 11.9* 13.3* 12.8*   HGB 8.4* 7.0* 7.7* 10.5* 
           Pulmonary CV progress Note: 4/18/2025        Deb Quijano                                                     Admission Date: 4/10/2025     The patient's chart is reviewed and the patient is discussed with the staff.    Background: 59 y.o. y/o female  S/P CABG x 3; 4/14 by Dr. Garcia.  Returned to OR 4/15 for bleeding in chest, had left mammary bleeder cauterized.  Moved to step-down 4/16.     PMH:   Cocaine abuse, CAD in native artery, hyperlipidemia, GERD, NSTEMI, CAD, s/p CABG x 3, anxiety, dysthymia, migraine with aura, osteoarthritis, allergic rhinitis, vitamin D deficiency.    Subjective/Objective:     Pt is -1.2L in last 24 hours, +2.6L since admission.   Pt is on RA. Pt reports occasional cough with clear to white mucous production.   Pt complains of nausea. She also asks for pain medication.   She is pulling 500cc on IS. She is asking about going home.      Review of Systems: Comprehensive ROS negative except in HPI  Objective:   Blood pressure 107/63, pulse 60, temperature 97.9 °F (36.6 °C), temperature source Temporal, resp. rate 21, height 1.473 m (4' 10\"), weight 63 kg (138 lb 14.2 oz), SpO2 92%.   Intake/Output Summary (Last 24 hours) at 4/18/2025 0724  Last data filed at 4/18/2025 0612  Gross per 24 hour   Intake 950 ml   Output 2150 ml   Net -1200 ml     Physical Exam:          Constitutional:  awake, calm, no acute distress, on RA  EENMT:  sclera clear, pupils equal  Respiratory: Crackles/diminished, no coughing  Cardiovascular:  RRR with no M,G,R;  Musculoskeletal:  warm with no cyanosis, no lower extremity edema.   SKIN:  no jaundice or ecchymosis   Neurologic:  moving all extremities, fluent speech  Psychiatric:  calm    CXR:   4/18/25 4/17/25 4/16: improved left effusion, chest tube in place    04/15/25     04/14/25    04/15/25     04/14/25      LINES:               Recent Labs     04/15/25  1033 04/15/25  1603 04/16/25  0412 04/17/25  0312   WBC 7.1 11.9* 13.3* 12.8*   HGB 7.7* 
          Today's Date: 4/17/2025  Date of Admission: 4/10/2025      Chart Reviewed.  Subjective:     Patient feels tired. Appetite is poor.     Medications Reviewed.    Objective:       Vitals:    04/16/25 2338 04/17/25 0314 04/17/25 0445 04/17/25 0730   BP: 123/73 111/76  136/63   Pulse: 56 58  67   Resp: 16 18  19   Temp: 97.5 °F (36.4 °C) 97.1 °F (36.2 °C)  97.2 °F (36.2 °C)   TempSrc: Temporal Temporal  Temporal   SpO2: 97% 96%  95%   Weight:   63.5 kg (139 lb 15.9 oz)    Height:           Intake and Output  Current Shift: 04/17 0701 - 04/17 1900  In: 200 [P.O.:200]  Out: 300 [Urine:300]   Last 3 Shifts: 04/15 1901 - 04/17 0700  In: 1374.4 [P.O.:740; I.V.:592.4]  Out: 1722 [Urine:1297]    Physical Exam:  General: Well Developed, Well Nourished, No Acute Distress, Alert & Oriented x 3, Appropriate mood  Neck: supple, no JVD  Heart: S1S2 with RRR without murmurs or gallops  Lungs: mostly clear but decreased at bases  Abd: soft, nontender, nondistended, with good bowel sounds  Ext: no edema bilaterally  Sternal incision: clean, dry, and intact  Skin: warm and dry    LABS  Data Review:   Recent Labs     04/15/25  0324 04/15/25  1033 04/15/25  1603 04/16/25  0412 04/17/25  0312    137   < > 134* 129*   K 3.7 4.1   < > 4.4 4.4   MG 2.1 2.2   < > 2.2 2.2   BUN 7* 7*   < > 8 15   WBC 9.2 7.1   < > 13.3* 12.8*   HGB 7.0* 7.7*   < > 10.2* 10.2*   HCT 20.1* 21.8*   < > 30.4* 30.3*   PLT 83* 84*   < > 107* 136*   INR 1.4 1.2  --   --   --     < > = values in this interval not displayed.       Estimated Creatinine Clearance: 85 mL/min (based on SCr of 0.6 mg/dL).      Assessment/Plan:     *S/P CABG x 3  On ASA, BB, statin, BP stable, currently stable on room air, will remove pacing wires, continue PT     Active Hospital Problems    CAD in native artery  S/p CABG      Cocaine use  Cessation strongly encouraged       Chest pain  S/p CABG      Elevated glucose level      CAD, multiple vessel  S/p CABG      
          Today's Date: 4/18/2025  Date of Admission: 4/10/2025    POD 3 Days Post-Op    Chart Reviewed.  Subjective:     Patient feels better today but still has some intermittent nausea.     Medications Reviewed.    Objective:       Vitals:    04/18/25 0631 04/18/25 0632 04/18/25 0633 04/18/25 0713   BP:    107/63   Pulse: 61 63 62 60   Resp:    21   Temp:    97.9 °F (36.6 °C)   TempSrc:    Temporal   SpO2:    92%   Weight:       Height:           Intake and Output  Current Shift: 04/18 0701 - 04/18 1900  In: 100 [P.O.:100]  Out: -    Last 3 Shifts: 04/16 1901 - 04/18 0700  In: 1190 [P.O.:1190]  Out: 2450 [Urine:2450]    Physical Exam:  General: Well Developed, Well Nourished, No Acute Distress, Alert & Oriented x 3, Appropriate mood  Neck: supple, no JVD  Heart: S1S2 with RRR without murmurs or gallops  Lungs: Clear throughout auscultation bilaterally without adventitious sounds  Abd: soft, nontender, nondistended, with good bowel sounds  Ext: mild edema bilaterally  Sternal incision: wound vac in place   Skin: warm and dry    LABS  Data Review:   Recent Labs     04/15/25  1033 04/15/25  1603 04/16/25  0412 04/17/25  0312 04/18/25  0509      < > 134* 129*  --    K 4.1   < > 4.4 4.4 4.1   MG 2.2   < > 2.2 2.2 2.0   BUN 7*   < > 8 15  --    WBC 7.1   < > 13.3* 12.8*  --    HGB 7.7*   < > 10.2* 10.2*  --    HCT 21.8*   < > 30.4* 30.3*  --    PLT 84*   < > 107* 136*  --    INR 1.2  --   --   --   --     < > = values in this interval not displayed.       Estimated Creatinine Clearance: 84 mL/min (based on SCr of 0.6 mg/dL).      Assessment/Plan:     *S/P CABG x 3  On ASA, BB, statin, BP stable, currently stable on room air, pacing wires removed, continue PT      Active Hospital Problems    CAD in native artery  S/p CABG       Cocaine use  Cessation strongly encouraged        Chest pain  S/p CABG       Elevated glucose level       CAD, multiple vessel  S/p CABG       Hypoxemia  Resolved        Atelectasis  IS 
          Today's Date: 4/20/2025  Date of Admission: 4/10/2025    POD 5 Days Post-Op    Chart Reviewed.  Subjective:     POD5 CABG, patient is resting in her bed    Medications Reviewed.    Objective:       Vitals:    04/20/25 0145 04/20/25 0714 04/20/25 1005 04/20/25 1052   BP:  121/63  (!) 148/70   Pulse:  62  60   Resp:  20 18 16   Temp:  97.7 °F (36.5 °C)  98.2 °F (36.8 °C)   TempSrc:  Oral  Oral   SpO2:  90%  92%   Weight: 59.8 kg (131 lb 13.4 oz)      Height:           Intake and Output  Current Shift: 04/20 0701 - 04/20 1900  In: 240 [P.O.:240]  Out: 800 [Urine:800]   Last 3 Shifts: 04/18 1901 - 04/20 0700  In: 1250 [P.O.:1250]  Out: 3050 [Urine:3050]    Physical Exam:  General: Well Developed, Well Nourished, No Acute Distress  HEENT: pupils equal and round, no abnormalities noted  Neck: supple, no JVD  Heart: perfusing well, normal heart rate  Lungs: oxygenating well on room air  Abd: soft, nontender, nondistended,  Ext: warm  Skin: warm and dry  Psychiatric: Normal mood and affect  Neurologic: Alert and oriented X 3    LABS  Data Review:   Recent Labs     04/18/25  0509 04/19/25  0402   NA  --  134*   K 4.1 4.2   MG 2.0 2.0   BUN  --  8       No results for input(s): \"TNIPOC\" in the last 72 hours.    Invalid input(s): \"TROIQ\"    Estimated Creatinine Clearance: 85 mL/min (A) (based on SCr of 0.58 mg/dL (L)).      Assessment/Plan:     POD5 CABG  Encourage IS  Mobilize with PT  Dispo pending insurance approval for SNF     Please message with questions or concerns      Vazquez Go MD  
  TIMEOUT performed prior to extubation on Deb Quijano with RT, primary RN, and charge RN present on 4/14/2025 at 4:11 PM.     Per anesthesia team on admission, patient's intubation was no acute fracture or dislocation    ABG results as follows:  No results found for: \"IBD\"      The patient is hemodynamically stable and can demonstrate the following on a consistent basis:  follow commands , elevate head off the pillow, nods appropriately to questions.      Dr. Garcia and Karina notified of weaning parameters and order to extubate obtained.     Patient extubated by (RT name) Rohit to (Type of O2 Device) January at (Amount of of O2) 40L/40% without complication.  
4 Eyes Skin Assessment     NAME:  Deb Quijano  YOB: 1965  MEDICAL RECORD NUMBER:  [id number]    The patient is being assessed for  Admission    I agree that at least one RN has performed a thorough Head to Toe Skin Assessment on the patient. ALL assessment sites listed below have been assessed.      Areas assessed by both nurses:    Head, Face, Ears, Shoulders, Back, Chest, Arms, Elbows, Hands, Sacrum. Buttock, Coccyx, Ischium, and Legs. Feet and Heels        Does the Patient have a Wound? No noted wound(s)       Adal Prevention initiated by RN: Yes  Wound Care Orders initiated by RN: No    Pressure Injury (Stage 3,4, Unstageable, DTI, NWPT, and Complex wounds) if present, place Wound referral order by RN under : No    New Ostomies, if present place, Ostomy referral order under : No     Nurse 1 eSignature: Electronically signed by Phuong Medina RN on 4/14/25 at 4:13 PM EDT    **SHARE this note so that the co-signing nurse can place an eSignature**    Nurse 2 eSignature: Electronically signed by NATHALIA MARCUS RN on 4/14/25 at 7:25 PM EDT    
4 Eyes Skin Assessment     NAME:  Deb Quijano  YOB: 1965  MEDICAL RECORD NUMBER:  [id number]    The patient is being assessed for  Post-Op Surgical    I agree that at least one RN has performed a thorough Head to Toe Skin Assessment on the patient. ALL assessment sites listed below have been assessed.      Areas assessed by both nurses:    Head, Face, Ears, Shoulders, Back, Chest, Arms, Elbows, Hands, Sacrum. Buttock, Coccyx, Ischium, Legs. Feet and Heels, and Under Medical Devices         Does the Patient have a Wound? No noted wound(s)       Adal Prevention initiated by RN: Yes  Wound Care Orders initiated by RN: No    Pressure Injury (Stage 3,4, Unstageable, DTI, NWPT, and Complex wounds) if present, place Wound referral order by RN under : No    New Ostomies, if present place, Ostomy referral order under : No     Nurse 1 eSignature: Electronically signed by Lorenzo Morillo RN on 4/15/25 at 3:44 PM EDT    **SHARE this note so that the co-signing nurse can place an eSignature**    Nurse 2 eSignature: Electronically signed by Vesta Medina RN on 4/15/25 at 4:40 PM EDT   
4 Eyes Skin Assessment     NAME:  Deb Quijano  YOB: 1965  MEDICAL RECORD NUMBER:  [id number]    The patient is being assessed for  Transfer to New Unit    I agree that at least one RN has performed a thorough Head to Toe Skin Assessment on the patient. ALL assessment sites listed below have been assessed.      Areas assessed by both nurses:    Head, Face, Ears, Shoulders, Back, Chest, Arms, Elbows, Hands, Sacrum. Buttock, Coccyx, Ischium, and Legs. Feet and Heels        Does the Patient have a Wound? No noted wound(s)       Adal Prevention initiated by RN: No  Wound Care Orders initiated by RN: No    Pressure Injury (Stage 3,4, Unstageable, DTI, NWPT, and Complex wounds) if present, place Wound referral order by RN under : No    New Ostomies, if present place, Ostomy referral order under : No     Nurse 1 eSignature: Electronically signed by Justin Lundberg RN on 4/16/25 at 4:00 PM EDT    **SHARE this note so that the co-signing nurse can place an eSignature**    Nurse 2 eSignature: Electronically signed by Fadia Cuadra RN on 4/16/25 at 7:05 PM EDT    
ACUTE PHYSICAL THERAPY GOALS:   (Developed with and agreed upon by patient and/or caregiver.)  LTG:  (1.)Ms. Quijano will move from supine to sit and sit to supine , scoot up and down and roll side to side in flat bed without siderails with  CONTACT GUARD ASSIST within 7 day(s).    (2.)Ms. Quijano will perform all functional transfers with  INDEPENDENT using the least restrictive/no device within 7 day(s).    (3.)Ms. Quijano will ambulate with  INDEPENDENT for 500+ feet with normal vital sign response with the least restrictive/no device within 7 day(s).   (4.)Ms. Quijano will ambulate up/down 1 steps with bilateral railing with  MODIFIED INDEPENDENCE with no device within  7  day(s).         PHYSICAL THERAPY Initial Assessment, Daily Note, and AM  (Link to Caseload Tracking: PT Visit Days : 1  Acknowledge Orders  Time In/Out  PT Charge Capture  Rehab Caseload Tracker    Deb Quijano is a 59 y.o. female   PRIMARY DIAGNOSIS: S/P CABG x 3  Chest pain [R07.9]  CAD in native artery [I25.10]  CAD, multiple vessel [I25.10]  Procedure(s) (LRB):  CARDIAC RE-ENTRY WITH EVACUATION OF HEMATOMA (N/A)  1 Day Post-Op  Reason for Referral: Other abnormalities of gait and mobility (R26.89)  Inpatient: Payor: AMBETTER / Plan: AMBETTER / Product Type: *No Product type* /     ASSESSMENT:     REHAB RECOMMENDATIONS:   Recommendation to date pending progress:  Setting:  Home Health Therapy    Equipment:    To Be Determined     ASSESSMENT:  Ms. Quijano underwent CABGx3 on 4/14, and then cardiac reentry with evacuation of hematoma on 4/15.  Patient presents in CVICU sitting in recliner and reluctantly agreeable for PT.  She was very [place] and difficult to communicate with.  Patient was given min A to scoot to edge of chair and stand up.  She was able to ambulate 85' with cardiac RW and CGA x2.  HR remained in 60's and SpO2 remained >90% on room air.  Returned to recliner.  Patient needed max verbal and tactile cueing for correct technique and to adhere to 
ACUTE PHYSICAL THERAPY GOALS:   (Developed with and agreed upon by patient and/or caregiver.)  LTG:  (1.)Ms. Quijano will move from supine to sit and sit to supine , scoot up and down and roll side to side in flat bed without siderails with  CONTACT GUARD ASSIST within 7 day(s).    (2.)Ms. Quijano will perform all functional transfers with  INDEPENDENT using the least restrictive/no device within 7 day(s).    (3.)Ms. Quijano will ambulate with  INDEPENDENT for 500+ feet with normal vital sign response with the least restrictive/no device within 7 day(s).   (4.)Ms. Quijano will ambulate up/down 1 steps with bilateral railing with  MODIFIED INDEPENDENCE with no device within  7  day(s).    PHYSICAL THERAPY: Daily Note AM   (Link to Caseload Tracking: PT Visit Days : 2  Time 956In/Out PT Charge Capture  Rehab Caseload Tracker  Orders    Deb Quijano is a 59 y.o. female   PRIMARY DIAGNOSIS: S/P CABG x 3  Chest pain [R07.9]  CAD in native artery [I25.10]  CAD, multiple vessel [I25.10]  Procedure(s) (LRB):  CARDIAC RE-ENTRY WITH EVACUATION OF HEMATOMA (N/A)  2 Days Post-Op  Inpatient: Payor: AMBETTER / Plan: AMBETTER / Product Type: *No Product type* /     ASSESSMENT:     REHAB RECOMMENDATIONS:   Recommendation to date pending progress:  Setting:  Home Health Therapy    Equipment:    To Be Determined     ASSESSMENT:  Ms. Quijano is sitting in the recliner and requires encouragement to participate in her therapy.   Scooting and sit to stand requires min assist, the patient is assisted to/from the bathroom this session.  Gait training with rolling walker x 80 feet with slow mrakos , she is returned to the room and to the recliner and after a rest break the patient is instructed in therapeutic exercises, tolerated well .  Good session with slow progress.  Continue PT efforts.  HHPT at d/c.  Patient will require a lot encouragement to progress due to her self limit nature.  She is also [place].  MD visits      SUBJECTIVE:   Ms. Quijano states, \"I don't 
ACUTE PHYSICAL THERAPY GOALS:   (Developed with and agreed upon by patient and/or caregiver.)  LTG:  (1.)Ms. Quijano will move from supine to sit and sit to supine , scoot up and down and roll side to side in flat bed without siderails with  CONTACT GUARD ASSIST within 7 day(s).    (2.)Ms. Quijano will perform all functional transfers with  INDEPENDENT using the least restrictive/no device within 7 day(s).    (3.)Ms. Quijano will ambulate with  INDEPENDENT for 500+ feet with normal vital sign response with the least restrictive/no device within 7 day(s).   (4.)Ms. Quijano will ambulate up/down 1 steps with bilateral railing with  MODIFIED INDEPENDENCE with no device within  7  day(s).    PHYSICAL THERAPY: Daily Note AM   (Link to Caseload Tracking: PT Visit Days : 3  Time In/Out  PT Charge Capture  Rehab Caseload Tracker  Orders    Deb Quijano is a 59 y.o. female   PRIMARY DIAGNOSIS: S/P CABG x 3  Chest pain [R07.9]  CAD in native artery [I25.10]  CAD, multiple vessel [I25.10]  Procedure(s) (LRB):  CARDIAC RE-ENTRY WITH EVACUATION OF HEMATOMA (N/A)  3 Days Post-Op  Inpatient: Payor: AMBETTER / Plan: AMBETTER / Product Type: *No Product type* /     ASSESSMENT:     REHAB RECOMMENDATIONS:   Recommendation to date pending progress:  Setting:  Home Health Therapy    Equipment:    To Be Determined     ASSESSMENT:  Ms. Quijano is sitting in the recliner and requires encouragement to participate in her therapy.   Scooting and sit to stand requires min assist,  balance improved.   Gait training with rolling walker x 100 feet with slow markos , she is returned to the room and to the recliner and after a rest break the patient  performed exercises, tolerated well .  Good session with slow progress.  Continue PT efforts.  HHPT at d/c however her mother has concerns about the patient returning home at d/c and would like rehab.  Patient will require a lot encouragement to progress due to her self limit nature.  She is also [place].      SUBJECTIVE: 
ACUTE PHYSICAL THERAPY GOALS:   (Developed with and agreed upon by patient and/or caregiver.)  LTG:  (1.)Ms. Quijano will move from supine to sit and sit to supine , scoot up and down and roll side to side in flat bed without siderails with  CONTACT GUARD ASSIST within 7 day(s).    (2.)Ms. Quijano will perform all functional transfers with  INDEPENDENT using the least restrictive/no device within 7 day(s).    (3.)Ms. Quijano will ambulate with  INDEPENDENT for 500+ feet with normal vital sign response with the least restrictive/no device within 7 day(s).   (4.)Ms. Quijano will ambulate up/down 1 steps with bilateral railing with  MODIFIED INDEPENDENCE with no device within  7  day(s).    PHYSICAL THERAPY: Daily Note AM   (Link to Caseload Tracking: PT Visit Days : 3  Time In/Out  PT Charge Capture  Rehab Caseload Tracker  Orders    Deb Quijano is a 59 y.o. female   PRIMARY DIAGNOSIS: S/P CABG x 3  Chest pain [R07.9]  CAD in native artery [I25.10]  CAD, multiple vessel [I25.10]  Procedure(s) (LRB):  CARDIAC RE-ENTRY WITH EVACUATION OF HEMATOMA (N/A)  4 Days Post-Op  Inpatient: Payor: AMBETTER / Plan: AMBETTER / Product Type: *No Product type* /     ASSESSMENT:     REHAB RECOMMENDATIONS:   Recommendation to date pending progress:  Setting:  Home Health Therapy    Equipment:    To Be Determined     ASSESSMENT:  Ms. Quijano is sitting in the recliner and agrees to therapy.  She stood and ambulated about 160' using rolling walker and min assist.  She returned to chair and rested, O2 sat decreased to 87%, gradually increasing to 94%.  She rested then performed below LE exercises.  [place] is [place].   HHPT at d/c however her mother has concerns about the patient returning home at d/c and would like rehab.  Patient does require some encouragement for increased activity.  She is making progress towards goals. Will continue with POC.      SUBJECTIVE:   Ms. Quijano states, \"I've been doing some of my exercises.\"     Social/Functional Lives With: 
ACUTE PHYSICAL THERAPY GOALS:   (Developed with and agreed upon by patient and/or caregiver.)  LTG:  (1.)Ms. Quijano will move from supine to sit and sit to supine , scoot up and down and roll side to side in flat bed without siderails with  CONTACT GUARD ASSIST within 7 day(s).    (2.)Ms. Quijano will perform all functional transfers with  INDEPENDENT using the least restrictive/no device within 7 day(s).    (3.)Ms. Quijano will ambulate with  INDEPENDENT for 500+ feet with normal vital sign response with the least restrictive/no device within 7 day(s).   (4.)Ms. Quijano will ambulate up/down 1 steps with bilateral railing with  MODIFIED INDEPENDENCE with no device within  7  day(s).    PHYSICAL THERAPY: Daily Note AM   (Link to Caseload Tracking: PT Visit Days : 5  Time In/Out  PT Charge Capture  Rehab Caseload Tracker  Orders    Deb Quijano is a 59 y.o. female   PRIMARY DIAGNOSIS: S/P CABG x 3  Chest pain [R07.9]  CAD in native artery [I25.10]  CAD, multiple vessel [I25.10]  Procedure(s) (LRB):  CARDIAC RE-ENTRY WITH EVACUATION OF HEMATOMA (N/A)  5 Days Post-Op  Inpatient: Payor: AMBETTER / Plan: AMBETTER / Product Type: *No Product type* /     ASSESSMENT:     REHAB RECOMMENDATIONS:   Recommendation to date pending progress:  Setting:  Home Health Therapy    Equipment:    To Be Determined     ASSESSMENT:  Ms. Quijano is sitting in the recliner and agrees to therapy.  She stood and ambulated about 200' using rolling walker and CGA/min assist.  She returned to chair and rested, O2 sat 91%.  She performed below LE exercises.  She is [place].   HHPT at d/c however her mother has concerns about the patient returning home at d/c and would like rehab. She is making progress towards goals. Will continue with POC.      SUBJECTIVE:   Ms. Quijano states, \"I have been doing a couple of my exercises.\"     Social/Functional Lives With: Parent  Type of Home: House  Home Layout: One level  Home Access: Ramped entrance  Entrance Stairs - Number of Steps: 
ACUTE PHYSICAL THERAPY GOALS:   (Developed with and agreed upon by patient and/or caregiver.)  LTG:  (1.)Ms. Quijano will move from supine to sit and sit to supine , scoot up and down and roll side to side in flat bed without siderails with  CONTACT GUARD ASSIST within 7 day(s).    (2.)Ms. Quijano will perform all functional transfers with  INDEPENDENT using the least restrictive/no device within 7 day(s).    (3.)Ms. Quijano will ambulate with  INDEPENDENT for 500+ feet with normal vital sign response with the least restrictive/no device within 7 day(s).   (4.)Ms. Quijano will ambulate up/down 1 steps with bilateral railing with  MODIFIED INDEPENDENCE with no device within  7  day(s).    PHYSICAL THERAPY: Daily Note AM   (Link to Caseload Tracking: PT Visit Days : 6  Time In/Out  PT Charge Capture  Rehab Caseload Tracker  Orders    Deb Quijano is a 59 y.o. female   PRIMARY DIAGNOSIS: S/P CABG x 3  Chest pain [R07.9]  CAD in native artery [I25.10]  CAD, multiple vessel [I25.10]  Procedure(s) (LRB):  CARDIAC RE-ENTRY WITH EVACUATION OF HEMATOMA (N/A)  6 Days Post-Op  Inpatient: Payor: AMBETTER / Plan: AMBETTER / Product Type: *No Product type* /     ASSESSMENT:     REHAB RECOMMENDATIONS:   Recommendation to date pending progress:  Setting:  Home Health Therapy    Equipment:    To Be Determined     ASSESSMENT:  Ms. Quijano was up in chair on contact and agreeable to work with therapy. The PT session today focused on therapeutic activities including ambulation, sit to stand transfer, therapeutic exercise, and toilet transfers . Pt needed stand by assistance, contact guard assistance and cues for step length. Progress toward goals demonstrated by increased activity.  Pt still functioning below their baseline with deficits in endurance and strength and will benefit from continued physical therapy.         SUBJECTIVE:   Ms. Quijano states \"They said I was going to get a shower.\"     Social/Functional Lives With: Parent  Type of Home: House  Home 
ACUTE PHYSICAL THERAPY GOALS:   (Developed with and agreed upon by patient and/or caregiver.)  LTG:  (1.)Ms. Quijano will move from supine to sit and sit to supine , scoot up and down and roll side to side in flat bed without siderails with  CONTACT GUARD ASSIST within 7 day(s).    (2.)Ms. Quijano will perform all functional transfers with  INDEPENDENT using the least restrictive/no device within 7 day(s).    (3.)Ms. Quijano will ambulate with  INDEPENDENT for 500+ feet with normal vital sign response with the least restrictive/no device within 7 day(s).   (4.)Ms. Quijano will ambulate up/down 1 steps with bilateral railing with  MODIFIED INDEPENDENCE with no device within  7  day(s).    PHYSICAL THERAPY: Daily Note PM   (Link to Caseload Tracking: PT Visit Days : 1  Time In/Out PT Charge Capture  Rehab Caseload Tracker  Orders    Deb Quijano is a 59 y.o. female   PRIMARY DIAGNOSIS: S/P CABG x 3  Chest pain [R07.9]  CAD in native artery [I25.10]  CAD, multiple vessel [I25.10]  Procedure(s) (LRB):  CARDIAC RE-ENTRY WITH EVACUATION OF HEMATOMA (N/A)  1 Day Post-Op  Inpatient: Payor: AMBETTER / Plan: AMBETTER / Product Type: *No Product type* /     ASSESSMENT:     REHAB RECOMMENDATIONS:   Recommendation to date pending progress:  Setting:  Home Health Therapy    Equipment:    To Be Determined     ASSESSMENT:  Ms. Quijano was coming out of bathroom with RN on presentation. She sat to rest, then stood with CG to min A and cueing for technique.  She was then able to ambulate 100' with RW and CGA. HR increased from 60's to 70's. Assisted to supine with mod A.  Performed ex's in sitting and supine with cueing for timing and exection.  By end of session, patient very fatigued and refused to attempt more activity.  Patient progressing slowly with transfers and gait. .     SUBJECTIVE:   Ms. Quijano states, \"I am so tired, I need to lay down\"     Social/Functional Lives With: Parent  Type of Home: House  Home Layout: One level  Home Access: Ramped 
ACUTE PHYSICAL THERAPY GOALS:   (Developed with and agreed upon by patient and/or caregiver.)  LTG:  (1.)Ms. Quijano will move from supine to sit and sit to supine , scoot up and down and roll side to side in flat bed without siderails with  CONTACT GUARD ASSIST within 7 day(s).    (2.)Ms. Quijano will perform all functional transfers with  INDEPENDENT using the least restrictive/no device within 7 day(s).    (3.)Ms. Quijano will ambulate with  INDEPENDENT for 500+ feet with normal vital sign response with the least restrictive/no device within 7 day(s).   (4.)Ms. Quijano will ambulate up/down 1 steps with bilateral railing with  MODIFIED INDEPENDENCE with no device within  7  day(s).    PHYSICAL THERAPY: Daily Note PM   (Link to Caseload Tracking: PT Visit Days : 3  Time In/Out  PT Charge Capture  Rehab Caseload Tracker  Orders    Deb Quijano is a 59 y.o. female   PRIMARY DIAGNOSIS: S/P CABG x 3  Chest pain [R07.9]  CAD in native artery [I25.10]  CAD, multiple vessel [I25.10]  Procedure(s) (LRB):  CARDIAC RE-ENTRY WITH EVACUATION OF HEMATOMA (N/A)  3 Days Post-Op  Inpatient: Payor: AMBETTER / Plan: AMBETTER / Product Type: *No Product type* /     ASSESSMENT:     REHAB RECOMMENDATIONS:   Recommendation to date pending progress:  Setting:  Home Health Therapy    Equipment:    To Be Determined     ASSESSMENT:  Ms. Quijano is sitting in the recliner and requires encouragement to participate in her therapy.   Scooting and sit to stand requires min assist,  balance improved.   Gait training with rolling walker x 100 feet with slow markos , she is returned to the room and to the recliner and after a rest break the patient  performed exercises, tolerated well .  Good session with slow progress.  Continue PT efforts.  HHPT at d/c however her mother has concerns about the patient returning home at d/c and would like rehab.  Patient will require a lot encouragement to progress due to her self limit nature.  She is also [place].   PM note:  Patient 
ACUTE PHYSICAL THERAPY GOALS:   (Developed with and agreed upon by patient and/or caregiver.)  LTG:  (1.)Ms. Quijano will move from supine to sit and sit to supine , scoot up and down and roll side to side in flat bed without siderails with  CONTACT GUARD ASSIST within 7 day(s).    (2.)Ms. Quijano will perform all functional transfers with  INDEPENDENT using the least restrictive/no device within 7 day(s).    (3.)Ms. Quijano will ambulate with  INDEPENDENT for 500+ feet with normal vital sign response with the least restrictive/no device within 7 day(s).   (4.)Ms. Quijano will ambulate up/down 1 steps with bilateral railing with  MODIFIED INDEPENDENCE with no device within  7  day(s).    PHYSICAL THERAPY: Daily Note PM   (Link to Caseload Tracking: PT Visit Days : 4  Time In/Out  PT Charge Capture  Rehab Caseload Tracker  Orders    Deb Quijano is a 59 y.o. female   PRIMARY DIAGNOSIS: S/P CABG x 3  Chest pain [R07.9]  CAD in native artery [I25.10]  CAD, multiple vessel [I25.10]  Procedure(s) (LRB):  CARDIAC RE-ENTRY WITH EVACUATION OF HEMATOMA (N/A)  4 Days Post-Op  Inpatient: Payor: AMBETTER / Plan: AMBETTER / Product Type: *No Product type* /     ASSESSMENT:     REHAB RECOMMENDATIONS:   Recommendation to date pending progress:  Setting:  Home Health Therapy    Equipment:    To Be Determined     ASSESSMENT:  Ms. Quijano is sitting in the recliner and agrees to therapy.  She stood and ambulated about 160' using rolling walker and min assist.  She returned to chair and rested, O2 sat decreased to 87%, gradually increasing to 94%.  She rested then performed below LE exercises.  She is [place].   HHPT at d/c however her mother has concerns about the patient returning home at d/c and would like rehab.  Patient does require some encouragement for increased activity.  She is making progress towards goals. Will continue with POC.     PM - pt supine on contact, sat to side of bed with CGA.  She is able to maintain gait distance as this morning 
ACUTE PHYSICAL THERAPY GOALS:   (Developed with and agreed upon by patient and/or caregiver.)  LTG:  (1.)Ms. Quijano will move from supine to sit and sit to supine , scoot up and down and roll side to side in flat bed without siderails with  CONTACT GUARD ASSIST within 7 day(s).    (2.)Ms. Quijano will perform all functional transfers with  INDEPENDENT using the least restrictive/no device within 7 day(s).    (3.)Ms. Quijano will ambulate with  INDEPENDENT for 500+ feet with normal vital sign response with the least restrictive/no device within 7 day(s).   (4.)Ms. Quijano will ambulate up/down 1 steps with bilateral railing with  MODIFIED INDEPENDENCE with no device within  7  day(s).    PHYSICAL THERAPY: Daily Note PM   (Link to Caseload Tracking: PT Visit Days : 5  Time In/Out  PT Charge Capture  Rehab Caseload Tracker  Orders    Deb Quijano is a 59 y.o. female   PRIMARY DIAGNOSIS: S/P CABG x 3  Chest pain [R07.9]  CAD in native artery [I25.10]  CAD, multiple vessel [I25.10]  Procedure(s) (LRB):  CARDIAC RE-ENTRY WITH EVACUATION OF HEMATOMA (N/A)  5 Days Post-Op  Inpatient: Payor: [employer]TER / Plan: AMBETTER / Product Type: *No Product type* /     ASSESSMENT:     REHAB RECOMMENDATIONS:   Recommendation to date pending progress:  Setting:  Home Health Therapy    Equipment:    To Be Determined     ASSESSMENT:  Ms. Quijano is sitting in the recliner and agrees to therapy.  She stood and ambulated about 200' using rolling walker and CGA/min assist.  She returned to chair and rested, O2 sat 91%.  She performed below LE exercises.  She is [place].   HHPT at d/c however her mother has concerns about the patient returning home at d/c and would like rehab. She is making progress towards goals. Will continue with POC.     PM - pt is up in chair and agrees to therapy.  She was able to increase her gait distance to 250' using rolling walker and CGA/min assist.  She returned to chair and was left up with needs in reach.  She is [place].  Will continue with 
All questions answered regarding surgery.      Hibiclens bath completed last night and this AM.  Nosin to magen.  NPO p MN.     Blood Consent signed and placed on chart.  Procedure consent to be signed in Pre-op.    Pt has viewed pre-op video.      Blood verified with blood bank.  Pre-op checklist completed.  VSS with BP assessed in both arms.  Height and weight updated on chart.  AM SQBS 146.    Pt demonstrated proper use of Incentive Spirometry and encouraged to use hourly.      Pre-op meds administered per MAR.  Ancef sent to pre-op with chart.    Allevyn placed on sacrum for prevention only per protocol    Pt to be transported on 3L NC to OR.    Personal belongings given to family.  Family escorted to main surgery waiting area.    
[facility] History and Assessment/Progress Note  Formerly named [facility]      Room # 102/01    Name: Deb Quijano           Age: 59 y.o.    Gender: female          MRN: [id number]  Restorationism: None       Preferred Language: English      Date: 04/15/25  Visit Time: Begin Time: 1100 End Time : 1110 Complexity: Complexity of Encounter: Low      Visit Summary:  met with patient in response to IDT rounds. Patient is hard of hearing and had difficulty communicating with .  Patient did affirm prayer when offered and asked for prayer to help with her coughing.  provided pastoral presence and prayer.   will continue to follow in CVICU.  Referral/Consult From: Referral/Consult From: Rounding  Encounter Overview/Reason: Encounter Overview/Reason: Initial Encounter    Service Provided For: Service Provided For: Patient     Patient was available.    Darling, Belief, Meaning:   Patient is connected with a darling tradition or spiritual practice  Family/Friends No family/friends present  Rituals      Importance and Influence:  Patient unable to assess at this time    Community:  Patient   Support system includes  and Extended family      Assessment and Plan of Care:   Emotions Expressed by Patient:   Assessment: Calm, Coping    Interventions by :   Intervention: Active listening, Sustaining Presence/Ministry of presence, Prayer (assurance of)/[place], Explored/Affirmed feelings, thoughts, concerns     Result/ Response by Patient:   Outcome: Coping, Receptive, Engaged in conversation, Expressed feelings, needs, and concerns    Patient Plan of Care:      will continue to follow in CVICU      Electronically signed by Chaplain Ata, on 4/15/2025 at 11:25 AM.  Spiritual Health  [facility]  [phone]   
CTS- no CP, CABG in am, all questions answered  
Cardiac Rehab: Spoke with patient regarding referral to cardiac rehab. Patient meets admission criteria based on NSTEMI with CABGx3(4/14/25).  Written information about Cardiac Rehab given and reviewed with patient. Discussed lifestyle modifications to promote cardiac wellness. Patient indicated that that she may want to participate in the cardiac rehab program. She is hoping to go to [facility] due to her concerns her mother can not care for her. Pt states he does not drive and depends on her mother for all transportation needs. We will follow up with her after discharge home as she requested. Her Cardiologist is Dr. Anton.      Thank you,  Pastora Pepe, RN, BSN  Cardiopulmonary Rehabilitation Nurse Liaison  [facility]    
Discharge instructions, follow up appointments and prescriptions reviewed with patient and family. Both verbalize understanding. All personal belongings taken with patient. Family member will drive patient home. Patient escorted to discharge area via wheelchair. Patient is stable at discharge.       
Discussed risks with patient and patient's family about the dangers of exertion with MVD. Both verbalized understanding. Patient politely refusing BSC and bed alarm at this time.   
IP Consult to Vascular Access Team  Consult performed by: Fidel Morales RN  Consult ordered by: Nba Garcia MD      US Guided PIV access-    Ultrasound was used to find the vein which was compressible and without any ultrasound features of an artery or nerve bundle. Skin was cleaned and disinfected prior to IV puncture.  Under real-time ultrasound guidance peripheral access was obtained in the right basilic using 20 G 2.5\" B Gallegos Deep Access after 1 attempt(s). Blood return was present and IV flushed without difficulty with no clinical signs of infiltration. IV dressing applied and no immediate complications noted. Patient tolerated the procedure well.        
Nutrition Assessment  Assessment Type: Initial  Reason for visit:  Length of Stay  Malnutrition Screening Tool Score: 0    Nutrition Intervention:   Food and/or Nutrient Delivery:   Meals and Snacks:  Diet: Continue current order  Medical Food Supplements:   Medical food supplement therapy:  Initiate Glucerna Nutrition Shake (diabetic oral supplement) 220 calories, 10 grams protein per 8 ounce serving     Malnutrition Assessment:  Academy/A.S.P.E.N Clinical Malnutrition Criteria  Malnutrition Status: At risk for malnutrition (Prolonged admission with declining PO)  Nutrition Focused Physical Exam: Unremarkable     Nutrition Assessment:  Food/Nutrition Related History: Patient states baseline appetite is great. She denies any changes to PO PTA.      Do You Have Any Cultural, Yazdanism, or Ethnic Food Preferences?: No   Weight History: Only historical data: 119# in 2019  Nutrition Background:       PMH: anxiety, cocaine abuse, chronic back pain, CAD, HLD. Presented with back pain radiating to bilateral arms, neck, and chest. She was admitted with NSTEMI, elevated A1c. S/p CABG x3 4/14. Returned to OR 4/15 for washout of left pleural clot.   Nutrition Monitoring/Evaluation:  Patient up to recliner. States she is eating just not much. She states that if she eats too much she feels nauseated and does not want to vomit. She only ordered soup for lunch today. Has been snacking on crackers between meals. C/o dry mouth. She was agreeable to nutrition supplement and would like strawberry.      Current Nutrition Therapies:  ADULT DIET; Regular    Current Intake:   Average Meal Intake: 1-25%        Anthropometric Measures:  Height: 147.3 cm (4' 9.99\")  Current Body Wt: 63 kg (138 lb 14.2 oz) (4/18), Weight source: Bed scale  BMI: 29, Overweight (BMI 25.0-29.9) (currently skewed due to fluid)  Admission Body Weight: 56.2 kg (124 lb) (4/10 standing scale)  Ideal Body Weight (Kg) (Calculated): 41 kg (90 lbs), 154.3 %  BMI Category 
POD 1 Day Post-Op    Procedure:  Procedure(s):  CARDIAC RE-ENTRY WITH EVACUATION OF HEMATOMA      Subjective:     Patient has No significant medical complaints      Objective:     Patient Vitals for the past 8 hrs:   BP Temp Pulse Resp SpO2 Weight   25 0630 (!) 126/59 -- 79 21 94 % --   25 0615 (!) 109/53 -- 75 27 94 % --   25 0600 (!) 117/57 -- 76 28 96 % 64.6 kg (142 lb 6.7 oz)   25 0545 138/69 -- 77 28 98 % --   25 0530 124/61 -- 73 30 100 % --   25 0515 113/61 -- 69 28 100 % --   25 0500 131/70 -- 70 26 99 % --   25 0445 116/61 -- 68 28 100 % --   25 0430 124/63 -- 67 29 100 % --   25 0423 -- -- 65 22 100 % --   25 0415 108/65 -- 65 25 100 % --   25 0400 106/60 -- 63 29 100 % --   25 0345 (!) 105/56 -- 64 21 100 % --   25 0330 (!) 99/56 -- 60 (!) 31 100 % --   25 0315 -- -- 58 27 100 % --   25 0300 116/63 99.7 °F (37.6 °C) 55 19 100 % --   25 0245 -- -- 56 28 100 % --   25 0230 -- -- 57 -- 100 % --   25 0215 (!) 94/55 -- 57 20 100 % --   25 0200 (!) 85/57 99.7 °F (37.6 °C) 59 -- 100 % --   25 0145 -- -- 59 16 100 % --   25 0130 -- -- 61 26 100 % --   25 0115 (!) 112/59 -- 64 19 100 % --   25 0100 (!) 91/52 99.7 °F (37.6 °C) 61 22 100 % --   25 0045 -- -- 67 15 100 % --   25 0030 -- -- 67 20 100 % --   25 0015 -- -- 66 28 100 % --   25 0000 (!) 99/57 100.2 °F (37.9 °C) 63 26 100 % --   04/15/25 2345 -- -- 62 20 100 % --   04/15/25 2330 -- -- 64 (!) 31 100 % --     Temp (24hrs), Av.7 °F (37.6 °C), Min:98.5 °F (36.9 °C), Max:100.6 °F (38.1 °C)        Hemodynamics    PAP    CO    CI    No intake/output data recorded.   1901 -  0700  In: 7971.5 [I.V.:5167.9]  Out: 4098 [Urine:1770]    CT Drainage              total of all CT's    Heart:  regular rate and rhythm, S1, S2 normal, no murmur, click, rub or gallop  Lung:  clear to auscultation 
POD 1 Day Post-Op    Procedure:  Procedure(s):  CORONARY ARTERY BYPASS GRAFT (CABG X 3), LIMA; ENDOSCOPIC VEIN HARVEST, LEFT GREATER SAPHENOUS VEIN  TRANSESOPHAGEAL ECHOCARDIOGRAM      Subjective:     Patient has No significant medical complaints      Objective:     Patient Vitals for the past 8 hrs:   BP Temp Temp src Pulse Resp SpO2   04/15/25 0646 (!) 131/47 100 °F (37.8 °C) -- 66 22 100 %   04/15/25 0645 (!) 96/51 -- -- 66 (!) 32 100 %   04/15/25 0630 (!) 96/52 -- -- 65 29 100 %   04/15/25 0615 (!) 92/50 -- -- 61 22 100 %   04/15/25 0600 (!) 102/54 100.4 °F (38 °C) -- 64 25 100 %   04/15/25 0545 (!) 99/56 -- -- 66 27 99 %   04/15/25 0530 (!) 101/52 -- -- 63 28 99 %   04/15/25 0515 (!) 87/54 -- -- 60 20 100 %   04/15/25 0500 (!) 88/54 100 °F (37.8 °C) -- 62 22 99 %   04/15/25 0445 (!) 88/51 -- -- 63 22 99 %   04/15/25 0430 (!) 88/51 -- -- 63 19 100 %   04/15/25 0415 (!) 89/51 -- -- 62 24 99 %   04/15/25 0400 (!) 93/51 100.4 °F (38 °C) -- 65 26 99 %   04/15/25 0348 (!) 86/52 -- -- 65 21 98 %   04/15/25 0346 -- -- -- 65 22 98 %   04/15/25 0345 (!) 100/58 -- -- 66 (!) 31 98 %   04/15/25 0330 (!) 95/50 -- -- 67 30 --   04/15/25 0329 -- -- -- 67 (!) 34 90 %   04/15/25 0318 (!) 96/53 -- -- 71 (!) 35 100 %   04/15/25 0316 -- -- -- 70 (!) 35 100 %   04/15/25 0315 (!) 95/52 -- -- 70 -- 100 %   04/15/25 0304 -- -- -- 71 25 100 %   04/15/25 0300 (!) 89/54 100.2 °F (37.9 °C) Bladder 71 23 100 %   04/15/25 0245 (!) 90/52 -- -- 71 22 100 %   04/15/25 0242 -- -- -- 70 (!) 34 100 %   04/15/25 0230 (!) 85/50 -- -- 72 29 100 %   04/15/25 0220 -- -- -- 73 30 100 %   04/15/25 0215 (!) 99/55 -- -- 73 30 100 %   04/15/25 0200 (!) 94/55 100.4 °F (38 °C) -- 71 17 100 %   04/15/25 0145 (!) 94/51 -- -- 71 26 100 %   04/15/25 0130 (!) 100/55 -- -- 74 24 100 %   04/15/25 0128 -- -- -- 73 22 100 %   04/15/25 0117 -- -- -- 71 28 100 %   04/15/25 0115 (!) 104/58 -- -- 72 23 100 %   04/15/25 0100 (!) 97/55 (!) 100.8 °F (38.2 °C) -- 72 22 100 % 
Pacing wires pulled per and by Paty Tavares  . Patient instructed to one hour bedrest with every 15 minute blood pressure checks for one hour. Pt voices understanding.         
Patient does not want male visitor Harsh to visit and has requested that security be called if he comes back tomorrow because she doesn't want him in her room  
Patient educated on hand hygiene after she had a BM and did not wash her hands. Educated mother and daughter on the risk of infection. Also went through the CABG folder with mother and daughter.    
Physical Therapy Note:    Attempted to see patient this AM for physical therapy evaluation session. Patient on hold today per RN.  Per chart patient may need to go back to OR. Will follow and re-attempt as schedule permits/patient available. Thank you,    A Ivy Penaloza, PT     Rehab Caseload Tracker    
Physical Therapy Note:    Attempted to see patient this PM for physical therapy evaluation session. Patient on hold today currently in OR. Will follow and re-attempt as schedule permits/patient available. Thank you,    A Ivy Penaloza, PT     Rehab Caseload Tracker    
Pt finished watching pre-op CABG video.  
Radial compression band removed at 1630 after slowly reducing air from 12 cc to zero as per hospital protocol.  No bleeding or hematoma noted. 2 x 2 gauze with tegaderm placed over puncture site. The affected extremity is warm and dry to the touch. Frequent vital signs printed and placed on bedside chart.  Patient instructed to call if any bleeding noted on gauze.  Patient verbalized understanding the nursing instructions.     
Respiratory Mechanics completed and are as follows:  NIF -24,  VC >1.2 L     Patient extubated to 40 lpm and 40% HHFNC. Patient isable to communicate and is negative for stridor. Breath sounds are clear. No complications with extubation.     AMERICO SIMMONS, ITAP  
Surgery consent in chart. Pt states she does not remember provider speaking to her about procedure. Consent unable to be signed at this time.   
TRANSFER - IN REPORT:    Verbal report received from Darline RN on Deb Quijano being received from [facility] ED for routine progression of care.     Report consisted of patient’s Situation, Background, Assessment and Recommendations(SBAR).     Information from the following report(s) SBAR and ED Summary was reviewed. Opportunity for questions and clarification was provided.      Assessment completed upon patient’s arrival to unit and care assumed.     Patient received to room 407. Patient connected to monitor and assessment completed. Plan of care reviewed. Patient oriented to room and call light. Patient aware to use call light to communicate any chest pain or needs.     Admission skin assessment completed with second RN and reveals the following: **     4 Eyes Skin Assessment     NAME:  Deb Quijano  YOB: 1965  MEDICAL RECORD NUMBER:  [id number]    The patient is being assessed for  Admission    I agree that at least one RN has performed a thorough Head to Toe Skin Assessment on the patient. ALL assessment sites listed below have been assessed.      Areas assessed by both nurses:    Sacrum. Buttock, Coccyx, Ischium and Legs. Feet and Heels        Does the Patient have a Wound? No noted wound(s)    No notes wounds; sacrum free of any redness of wounds       Adal Prevention initiated by RN: Yes  Wound Care Orders initiated by RN: No    Pressure Injury (Stage 3,4, Unstageable, DTI, NWPT, and Complex wounds) if present, place Wound referral order by RN under : No    New Ostomies, if present place, Ostomy referral order under : No     Nurse 1 eSignature: Electronically signed by ALVAREZ ARTEAGA RN on 4/11/25 at 3:22 AM EDT    **SHARE this note so that the co-signing nurse can place an eSignature**    Nurse 2 eSignature: Electronically signed by DEJA MERRITT RN on 4/11/25 at 3:40 AM EDT   
TRANSFER - IN REPORT:    Verbal report received from HORTENSIA Don (name) on Deb Quijano  being received from OR (unit) for routine post-op      Report consisted of patient’s Situation, Background, Assessment and   Recommendations(SBAR).     Information from the following report(s) SBAR, OR Summary, Procedure Summary, Recent Results, Med Rec Status, and Procedure Verification was reviewed with the receiving nurse.    Opportunity for questions and clarification was provided.      Assessment completed upon patient’s arrival to unit and care assumed.   
TRANSFER - IN REPORT:    Verbal report received from HORTENSIA Walker on Deb Quijano  being received from OR for routine post-op      Report consisted of patient's Situation, Background, Assessment and   Recommendations(SBAR).     Information from the following report(s) Nurse Handoff Report, Adult Overview, Intake/Output, MAR, Recent Results, Cardiac Rhythm NSR, and Alarm Parameters was reviewed with the receiving nurse.    Opportunity for questions and clarification was provided.      Assessment completed upon patient's arrival to unit and care assumed.    
TRANSFER - IN REPORT:    Verbal report received from Jocelyne BLEVINS on Deb Quijano being received from cath lab for routine progression of care.     Report consisted of patient’s Situation, Background, Assessment and Recommendations(SBAR).     Information from the following report(s) SBAR, MAR, and Quality Measures was reviewed. Opportunity for questions and clarification was provided.      Assessment completed upon patient’s arrival to unit and care assumed.     Patient received to room 407. Patient connected to monitor and assessment completed. Plan of care reviewed. Patient oriented to room and call light. Patient aware to use call light to communicate any chest pain or needs.       
TRANSFER - IN REPORT:    Verbal report received from Lorenzo BLEVINS on Deb Quijano  being received from CVICU for routine progression of patient care      Report consisted of patient's Situation, Background, Assessment and   Recommendations(SBAR).     Information from the following report(s) Surgery Report, Intake/Output, MAR, Recent Results, and Med Rec Status was reviewed with the receiving nurse.    Opportunity for questions and clarification was provided.          
TRANSFER - IN REPORT:    Verbal report received from River Daniel RN on Deb Quijano  being received from CVOR for routine post-op      Report consisted of patient's Situation, Background, Assessment and   Recommendations(SBAR).     Information from the following report(s) Nurse Handoff Report, Adult Overview, Surgery Report, Cardiac Rhythm NSR, and Neuro Assessment was reviewed with the receiving nurse.    Opportunity for questions and clarification was provided.      Assessment completed upon patient's arrival to unit and care assumed.     
TRANSFER - OUT REPORT:    Verbal report given to FELICITY Don on Deb Quijano  being transferred to CVOR for routine post-op       Report consisted of patient’s Situation, Background, Assessment and Recommendations(SBAR).     Information from the following report(s) SBAR and MAR was reviewed with the receiving nurse.    Opportunity for questions and clarification was provided.      
Ultrasound was used to find the vein which was compressible and without any ultrasound features of an artery or nerve bundle. Skin was cleaned and disinfected prior to IV puncture.  Under real-time ultrasound guidance peripheral access was obtained in the left cephalic using 20 G 2.5\" B Gallegos Deep Access after 1 attempt(s). No immediate complications noted. Patient tolerated the procedure well.  Refer to IV flowsheet for further documentation.     
[facility] w/ Dr. Anton  No interventions: surgical consult  R radial access  TR band to R radial @ 10 mL  No s/sxs of bleeding or hematoma to R radial access site    Heparin 2000 units  Versed 4 mg  Report called to FELICITY Chaidez in CPRU  
am  4/14/2025 6:26 AM    Admit Date: 4/10/2025    Admit Diagnosis: Chest pain [R07.9]  CAD in native artery [I25.10]      Subjective:    Patient : Patient with recent heart cath showing multivessel coronary artery disease including severe left main stenosis patient is scheduled for CABG today.  She is resting comfortably in no distress at this time    Objective:    BP (!) 141/85   Pulse 62   Temp 98 °F (36.7 °C)   Resp 16   Ht 1.473 m (4' 10\")   Wt 57.3 kg (126 lb 6.4 oz)   SpO2 98%   BMI 26.42 kg/m²     ROS:  General ROS: negative for - chills  Hematological and Lymphatic ROS: negative for - blood clots or jaundice  Respiratory ROS: no cough, shortness of breath, or wheezing  Cardiovascular ROS: no chest pain or dyspnea on exertion  Gastrointestinal ROS: no abdominal pain, change in bowel habits, or black or bloody stools  Neurological ROS: no TIA or stroke symptoms    Physical Exam:      Physical Examination: General appearance - Appearance: alert, well appearing, and in no distress.   Neck/lymph - supple, no significant adenopathy  Chest/CV - clear to auscultation, no wheezes, rales or rhonchi, symmetric air entry  Heart - normal rate, regular rhythm, normal S1, S2, no murmurs, rubs, clicks or gallops  Abdomen/GI - soft, nontender, nondistended, no masses or organomegaly   Musculoskeletal - no joint tenderness, deformity or swelling  Extremities - peripheral pulses normal, no pedal edema, no clubbing or cyanosis  Skin - normal coloration and turgor, no rashes, no suspicious skin lesions noted    Current Facility-Administered Medications   Medication Dose Route Frequency    lidocaine 1 % injection 1 mL  1 mL IntraDERmal Once PRN    fentaNYL (SUBLIMAZE) injection 100 mcg  100 mcg IntraVENous Once PRN    famotidine (PEPCID) 20 MG/2ML 20 mg in sodium chloride (PF) 0.9 % 10 mL injection  20 mg IntraVENous Once PRN    lactated ringers infusion   IntraVENous Continuous    sodium chloride flush 0.9 % injection 
function - continue low dose metoprolol 12.5 mg BID    Chino Katz MD  4/15/2025 6:37 AM    
infarction) (HCC)      Kvng Duncan MD  4/20/2025 10:12 AM   
upon contact and ready to get up.  Mother is at bedside.  Sit to stand with min assist and walks out of the bathroom with hand held assist to the walker.  Gait training x 80 feet with slow markos.  Exercises continued.  Making progress.     SUBJECTIVE:   Ms. Quijano states, \"OK\"     Social/Functional Lives With: Parent  Type of Home: House  Home Layout: One level  Home Access: Ramped entrance  Entrance Stairs - Number of Steps: 5  Entrance Stairs - Rails: Both  Bathroom Toilet: Standard  Bathroom Accessibility: Accessible  Receives Help From: Family  Prior Level of Assist for ADLs: Independent  Prior Level of Assist for Homemaking: Independent  Prior Level of Assist for Ambulation: Independent community ambulator, with or without device  Prior Level of Assist for Transfers: Independent  Active : Yes  Occupation: Unemployed  OBJECTIVE:     PAIN: VITALS / O2: PRECAUTION / LINES / DRAINS:   Pre Treatment: not rated         Post Treatment: not rated Vitals        Oxygen    Telemetry     RESTRICTIONS/PRECAUTIONS:  Position Activity Restriction  Sternal Precautions: No Pushing, No Pulling, 5# Lifting Restrictions     MOBILITY:  I Mod I S SBA CGA Min Mod Max Total  NT x2 Comments:   Bed Mobility    Rolling [] [] [] [] [] [] [] [] [] [x] []    Supine to Sit [] [] [] [] [] [] [] [] [] [x] []    Scooting [] [] [] [] [] [x] [] [] [] [] []    Sit to Supine [] [] [] [] [] [] [] [] [] [x] []    Transfers    Sit to Stand [] [] [] [] [] [x] [] [] [] [] []    Bed to Chair [] [] [] [] [] [] [] [] [] [x] []    Stand to Sit [] [] [] [] [] [x] [] [] [] [] []     [] [] [] [] [] [] [] [] [] [] []    I=Independent, Mod I=Modified Independent, S=Supervision, SBA=Standby Assistance, CGA=Contact Guard Assistance,   Min=Minimal Assistance, Mod=Moderate Assistance, Max=Maximal Assistance, Total=Total Assistance, NT=Not Tested    BALANCE: Good Fair+ Fair Fair- Poor NT Comments   Sitting Static [] [x] [] [] [] []    Sitting Dynamic [] [x] [] 
and Path deviations     Weightbearing Status      Stairs      I=Independent, Mod I=Modified Independent, S=Supervision, SBA=Standby Assistance, CGA=Contact Guard Assistance,   Min=Minimal Assistance, Mod=Moderate Assistance, Max=Maximal Assistance, Total=Total Assistance, NT=Not Tested    PLAN:   FREQUENCY AND DURATION: BID for duration of hospital stay or until stated goals are met, whichever comes first.    TREATMENT:   TREATMENT:   Therapeutic Activity (23 Minutes): Therapeutic activity included Scooting, Transfer Training, Ambulation on level ground, Sitting balance , Standing balance, and LE ex to improve functional Activity tolerance, Balance, Mobility, and Strength.    TREATMENT GRID:                  Date:  4/16/25 Date:  4/17/25  Date:  4/18/25  Date:  4/19/25 Date:  4/20/25   ACTIVITY/EXERCISE AM PM AM PM AM PM AM AM   Ankle pumps X10 AB X20 AB   10    10   15 15   Long arc quads X10 AB  x10 AB  10    10   15 15    Hip ab/ad   X10 AB  10    10   15 15    Hip ER/IR   X10 AB              shoulder shrugs      10    10   15 15    abduction      10    10        marching      10    10   15 15   B = bilateral; AA = active assistive; A = active; P = passive    AFTER TREATMENT PRECAUTIONS: Alarm Activated, Bed/Chair Locked, Call light within reach, Chair, Needs within reach, and RN notified    INTERDISCIPLINARY COLLABORATION:  RN/ PCT and PT/ PTA    EDUCATION:    Educated patient and/or family/caregiver on the following: Sternal Precautions , Bracing with cardiac pillow, Assistive device indications/utilization/safety, and Fall prevention strategies    TIME IN/OUT:  Time In: 0927  Time Out: 0950  Minutes: 23    TRISTAN BARNETT PTA    
cm    LA Major McBain 4.2 cm    LA Area 2C 15.9 cm2    LA Area 4C 12.5 cm2    LA Volume MOD A2C 44 22 - 52 mL    LA Volume MOD A4C 29 22 - 52 mL    LA Volume BP 37 22 - 52 mL    AV Peak Velocity 1.4 m/s    AV Peak Gradient 8 mmHg    AV Peak Gradient 8 mmHg    AV Mean Velocity 0.9 m/s    AV Mean Gradient 4 mmHg    AV VTI 27.8 cm    AV Area by VTI 2.1 cm2    AV Area by Peak Velocity 1.9 cm2    Aortic Root 3.5 cm    Ascending Aorta 2.7 cm    IVC Proxmal 1.1 cm    MR .0 cm    MV Mean Gradient 1 mmHg    MV VTI 26.4 cm    MV Mean Velocity 0.6 m/s    MV Max Velocity 0.8 m/s    MV Peak Gradient 3 mmHg    MV E Wave Deceleration Time 144.0 ms    MV A Velocity 0.74 m/s    MV E Velocity 0.63 m/s    MV Area by VTI 2.2 cm2    AZ End Diastolic Max Velocity 1.1 m/s    Pulmonary Artery EDP 5 mmHg    PV .0 ms    PV Max Velocity 0.8 m/s    PV Peak Gradient 3 mmHg    RV Basal Dimension 3.1 cm    RV Free Wall Peak S' 12.1 cm/s    TAPSE 2.0 >=1.7 cm    TR Max Velocity 2.57 m/s    TR Peak Gradient 26 mmHg    TR Peak Gradient 26 mmHg    Body Surface Area 1.52 m2    Fractional Shortening 2D 26 28 - 44 %    LV ESV Index A4C 28 mL/m2    LV EDV Index A4C 66 mL/m2    LV ESV Index A2C 26 mL/m2    LV EDV Index A2C 64 mL/m2    LVIDd Index 3.09 cm/m2    LVIDs Index 2.28 cm/m2    LV RWT Ratio 0.43     LV Mass 2D 158.8 67 - 162 g    LV Mass 2D Index 106.6 (A) 43 - 95 g/m2    MV E/A 0.85     E/E' Ratio (Averaged) 7.35     E/E' Lateral 6.66     E/E' Septal 8.05     LA Volume Index BP 25 16 - 34 ml/m2    LVOT Stroke Volume Index 38.8 mL/m2    LA Volume Index MOD A2C 30 16 - 34 ml/m2    LA Volume Index MOD A4C 19 16 - 34 ml/m2    Ao Root Index 2.35 cm/m2    Ascending Aorta Index 1.81 cm/m2    AV Velocity Ratio 0.57     LVOT:AV VTI Index 0.66     HENOK/BSA VTI 1.4 cm2/m2    HENOK/BSA Peak Velocity 1.3 cm2/m2    MV:LVOT VTI Index 1.43     EF Physician 60 %   POCT Glucose    Collection Time: 04/11/25 11:39 AM   Result Value Ref Range    POC Glucose

## 2025-04-21 NOTE — DISCHARGE SUMMARY
Discharge Summary     Patient ID:  Deb Quijano  [id number]  59 y.o.  1965    Admit date: 4/10/2025    Discharge date:  2025    Admitting Physician: Nba Garcia MD     Discharge Physician: MAE Lovett/Dr. Garcia    Admission Diagnoses: Chest pain [R07.9]  CAD in native artery [I25.10]  CAD, multiple vessel [I25.10]    Discharge Diagnoses:   Patient Active Problem List    Diagnosis Date Noted    CAD in native artery 2025    Cocaine use 2025    Chest pain 04/10/2025    Type 2 diabetes mellitus with circulatory disorder, without long-term current use of insulin ([facility]) 04/10/2025    Pleural effusion 2025    Mixed hyperlipidemia 2025    CAD, multiple vessel 2025    S/P CABG x 3 2025    Hypoxemia 2025    Atelectasis 2025    NSTEMI (non-ST elevated myocardial infarction) (Formerly [facility]) 04/10/2025    Vitamin D deficiency 2018    Hyperlipidemia     Allergic rhinitis     Migraine with aura     Anxiety     Dysthymia (or depressive neurosis)     Osteoarthritis     GERD (gastroesophageal reflux disease)        Procedures this admission:  Diagnostic left heart catheterization  EchoCardiogram  Coronary Artery Bypass Graft Surgery   Consults: CT surgery, Pulmonary/Intensive Care     Hospital Course: The patient is a 59 y.o. female who presented to the ER at [facility] with back pain with radiation to her arms and chest. She had noted this intermittently while walking for exercise. On ER evaluation, her troponin was elevated. She admitted to recent cocaine use. She states she had not used cocaine for a long time but a friend picked her up about a week ago and they used cocaine on that occasion. She denied using any other recreational drugs.   She was transferred to [facility]. Troponins were trending up. Echocardiogram showed a normal LV EF. LHC was planned. She underwent cardiac catheterization that showed critical LM stenosis.   Her father

## 2025-04-21 NOTE — CARE COORDINATION
Patient was declined by [facility]  by [facility] due to not in network with patient's insurance.     CM sent referral to [facility].

## 2025-04-22 ENCOUNTER — TELEPHONE (OUTPATIENT)
Age: 60
End: 2025-04-22

## 2025-04-22 NOTE — TELEPHONE ENCOUNTER
Care Transitions Initial Follow Up Call    Call within 2 business days of discharge: Yes     Patient: Deb Quijano Patient : 1965 MRN: [id number]    [unfilled]    RARS: Readmission Risk Score: 6.7       Spoke with: patient    Discharge department/facility: [facility]    Non-face-to-face services provided:  The patient is being discharged home with home health services in stable condition on a low saturated fat, low cholesterol and low salt diet. The patient is instructed to advance activities as tolerated to the limit of fatigue or shortness of breath. The patient is instructed to avoid all heavy lifting or activities that strain the chest or upper arm muscles. Strenuous activity should be avoided. The patient is instructed to watch for signs of infection which include: increasing area of redness, fever or purulent drainage at the incision site. The patient is instructed to call the office or return to the ER for immediate evaluation for any shortness of breath or chest pain not relieved by NTG.     All questions answered voiced understanding to discharge instructions    Follow Up  Future Appointments   Date Time Provider Department Center   2025  3:30 PM Aldo Penaloza MD [facility] AMB   2025 10:30 AM Kylie Whitfield MD [facility] AMB   2025  2:20 PM Nba Garcia MD [facility] AMB       Leighann Cormier LPN

## 2025-04-24 ENCOUNTER — TELEPHONE (OUTPATIENT)
Dept: INTERNAL MEDICINE CLINIC | Facility: CLINIC | Age: 60
End: 2025-04-24

## 2025-04-24 NOTE — TELEPHONE ENCOUNTER
Spoke with patient regarding TCM care and PCP follow-up.  She is doing much better since hospital discharge and is aware of her cardiology TCM appointment next week.  She is still interested in establishing care with a [facility] PCP.  Will ask Yamile Sheikh to kindly assist.

## 2025-04-25 NOTE — TELEPHONE ENCOUNTER
1st attempt to contact patient using phone number listed in chart number not in service Called EC number listed LVM

## 2025-04-29 ENCOUNTER — OFFICE VISIT (OUTPATIENT)
Age: 60
End: 2025-04-29

## 2025-04-29 VITALS
HEART RATE: 72 BPM | BODY MASS INDEX: 25.19 KG/M2 | WEIGHT: 120 LBS | SYSTOLIC BLOOD PRESSURE: 112 MMHG | DIASTOLIC BLOOD PRESSURE: 60 MMHG | HEIGHT: 58 IN

## 2025-04-29 DIAGNOSIS — Z95.1 STATUS POST CORONARY ARTERY BYPASS GRAFT: ICD-10-CM

## 2025-04-29 DIAGNOSIS — I10 PRIMARY HYPERTENSION: ICD-10-CM

## 2025-04-29 DIAGNOSIS — I25.10 CORONARY ARTERY DISEASE INVOLVING NATIVE CORONARY ARTERY OF NATIVE HEART WITHOUT ANGINA PECTORIS: Primary | ICD-10-CM

## 2025-04-29 RX ORDER — TRAZODONE HYDROCHLORIDE 50 MG/1
50 TABLET ORAL NIGHTLY
Qty: 3 TABLET | Refills: 0 | Status: SHIPPED | OUTPATIENT
Start: 2025-04-29

## 2025-04-29 NOTE — PATIENT INSTRUCTIONS
- Continue aspirin 81 mg daily and Plavix 75 mg daily  - Blood work in one month  - Return to clinic in 6 weeks  As we discussed, a Mediterranean-style diet typically includes:  *plenty of fruits, vegetables, bread and other grains, potatoes, beans, nuts and seeds;  *olive oil as a primary fat source; and  *dairy products, eggs, fish and poultry in low to moderate amounts.  *Fish and poultry are more common than red meat in this diet. It also centers on minimally processed, plant-based foods. Wine may be consumed in low to moderate amounts, usually with meals. Fruit is a common dessert instead of sweets.  **Read more at: https://www.heart.org/en/healthy-living/healthy-eating/eat-smart/nutrition-basics/mediterranean-diet  We also discussed the American Heart Association recommendations for physical activity in adults, which include:  *Get at least 150 minutes per week of moderate-intensity aerobic activity or 75 minutes per week of vigorous aerobic activity, or a combination of both, preferably spread throughout the week.  *Add moderate- to high-intensity muscle-strengthening activity (such as resistance or weights) on at least 2 days per week.  *Spend less time sitting. Even light-intensity activity can offset some of the risks of being sedentary.  *Gain even more benefits by being active at least 300 minutes (5 hours) per week.  *Increase amount and intensity gradually over time  **Read more at: https://www.heart.org/en/healthy-living/fitness/fitness-basics/aha-recs-for-physical-activity-in-adults

## 2025-04-29 NOTE — PROGRESS NOTES
CARDIOLOGY CLINIC FOLLOW-UP    Date: 4/29/2025  Patient's Primary Care Physician:  None, None  Referring Physician:  No ref. provider found     Subjective     Reason for Visit: Establish care, CAD status post CABG    History of Present Illness   Ms. Quijano is a 59 y.o. female with history of coronary artery disease status post CABG (4/25), non-insulin-dependent type 2 diabetes, cocaine abuse, hyperlipidemia, who was recently hospitalized for NSTEMI.  She was noted by cath to have critical left main stenosis,  of the RCA collateralized by a conus branch, critical ostial and severe mid LAD stenoses, and moderate diffuse disease of the proximal through mid circumflex.  Ejection fraction was normal.  Course was complicated by intrathoracic hematoma requiring washout. Her anginal equivalent was bilateral arm pain with or without chest tightness. This has not recurred.  She denies dyspnea.  Her only complaints today are anxiety and sleeplessness.    ECG: Normal sinus rhythm, normal intervals, no pathologic Q waves, no ischemic ST or T wave changes (4/16/2025)    Review of Systems   Cardiovascular review of systems negative accept as above.    No past medical history on file.   Past Surgical History:   Procedure Laterality Date    CARDIAC PROCEDURE N/A 4/11/2025    Left heart cath / coronary angiography performed by Ryan Anton MD at [facility] CARDIAC CATH LAB    CARDIAC SURGERY N/A 4/15/2025    CARDIAC RE-ENTRY WITH EVACUATION OF HEMATOMA performed by Nba Garcia MD at [facility] MAIN OR    CORONARY ARTERY BYPASS GRAFT N/A 4/14/2025    CORONARY ARTERY BYPASS GRAFT (CABG X 3), LIMA; ENDOSCOPIC VEIN HARVEST, LEFT GREATER SAPHENOUS VEIN performed by Nba Gacria MD at [facility] MAIN OR    TRANSESOPHAGEAL ECHOCARDIOGRAM N/A 4/14/2025    TRANSESOPHAGEAL ECHOCARDIOGRAM performed by Nba Garcia MD at [facility] MAIN OR      No family history on file.   Social History     Tobacco Use    Smoking status: Never    Smokeless

## 2025-05-13 ENCOUNTER — OFFICE VISIT (OUTPATIENT)
Dept: CARDIOTHORACIC SURGERY | Age: 60
End: 2025-05-13

## 2025-05-13 VITALS
BODY MASS INDEX: 25.57 KG/M2 | HEART RATE: 70 BPM | HEIGHT: 58 IN | WEIGHT: 121.8 LBS | OXYGEN SATURATION: 96 % | DIASTOLIC BLOOD PRESSURE: 69 MMHG | SYSTOLIC BLOOD PRESSURE: 104 MMHG

## 2025-05-13 DIAGNOSIS — Z95.1 S/P CABG X 3: Primary | ICD-10-CM

## 2025-05-13 PROCEDURE — 99024 POSTOP FOLLOW-UP VISIT: CPT | Performed by: THORACIC SURGERY (CARDIOTHORACIC VASCULAR SURGERY)

## 2025-05-13 NOTE — PROGRESS NOTES
[facility]  MD Nba Cuba MD          Deb Quijano       [id number]  5/13/2025 1965      Deb Quijano is seen today for follow-up status post CABG x 3 with LIMA to the LAD, reverse SVG to the OM and reverse SVG to the PDA on 4/14/25.   She had excessive chest tube output post op and was taken back to the OR on 4/15. She was noted to have a bleeding vessel on the mammary bed which was cauterized. A large amount of clot was removed.   She was returned to the CVICU. She was diuresed with lasix. She was transferred to  stepdown on 4/16. BP improved. She had some intermittent nausea. She initially requested to go to [facility] but she progressed well with PT and wished to go home with  instead. She was discharged on 4/21/25.     she is active and ambulatory.  There is no fever or shortness of breath.    Appetite is good.   Pain has improved.   Pt is sleeping well.    EXAM:  Vitals:  Blood pressure 104/69, pulse 70, height 1.471 m (4' 9.9\"), weight 55.2 kg (121 lb 12.8 oz), SpO2 96%.  Lungs:  Clear to auscultation bilaterally.  Heart: Regular rate and rhythm without murmurs.  Incision: Sternum stable. Incision healing well. Leg incisions healing well.    IMPRESSION and PLAN:  DC from CT surgery.   Pt is planning to begin cardiac rehab.    Sternal precautions: Pt advised to avoid any heavy lifting for another 4 weeks but can increase activity as tolerated.   Pt has cardiology follow-up with Dr. Penaloza  No need to schedule follow-up at this point but the patient may call or return anytime if issues or questions arise.        Paty Tavares PA-C  5/13/2025 4:58 PM

## 2025-05-19 ENCOUNTER — HOSPITAL ENCOUNTER (OUTPATIENT)
Dept: CARDIAC REHAB | Age: 60
Setting detail: RECURRING SERIES
Discharge: HOME | End: 2025-05-22

## 2025-05-19 ASSESSMENT — PATIENT HEALTH QUESTIONNAIRE - PHQ9
SUM OF ALL RESPONSES TO PHQ QUESTIONS 1-9: 7
2. FEELING DOWN, DEPRESSED OR HOPELESS: MORE THAN HALF THE DAYS
8. MOVING OR SPEAKING SO SLOWLY THAT OTHER PEOPLE COULD HAVE NOTICED. OR THE OPPOSITE, BEING SO FIGETY OR RESTLESS THAT YOU HAVE BEEN MOVING AROUND A LOT MORE THAN USUAL: NOT AT ALL
10. IF YOU CHECKED OFF ANY PROBLEMS, HOW DIFFICULT HAVE THESE PROBLEMS MADE IT FOR YOU TO DO YOUR WORK, TAKE CARE OF THINGS AT HOME, OR GET ALONG WITH OTHER PEOPLE: VERY DIFFICULT
6. FEELING BAD ABOUT YOURSELF - OR THAT YOU ARE A FAILURE OR HAVE LET YOURSELF OR YOUR FAMILY DOWN: NOT AT ALL
9. THOUGHTS THAT YOU WOULD BE BETTER OFF DEAD, OR OF HURTING YOURSELF: NOT AT ALL
SUM OF ALL RESPONSES TO PHQ QUESTIONS 1-9: 7
1. LITTLE INTEREST OR PLEASURE IN DOING THINGS: MORE THAN HALF THE DAYS
3. TROUBLE FALLING OR STAYING ASLEEP: MORE THAN HALF THE DAYS
5. POOR APPETITE OR OVEREATING: NOT AT ALL
7. TROUBLE CONCENTRATING ON THINGS, SUCH AS READING THE NEWSPAPER OR WATCHING TELEVISION: NOT AT ALL
4. FEELING TIRED OR HAVING LITTLE ENERGY: SEVERAL DAYS

## 2025-05-19 NOTE — PROGRESS NOTES
Dear Dr. Penaloza,    Thank you for referring your patient, Ms. Deb Quijano ( 1965), to the Cardiopulmonary Rehabilitation Program at [facility]. She is a good candidate for the Cardiac Rehab Program and should see improvements with regular participation.    We will be addressing appropriate interventions for modifiable risk factors with your patient during the next 12 weeks. We will contact you with any issues or concerns that may arise, or you can follow your patient's progress through [facility] at any time. A final summary will be sent to you when the program is completed.    Again, thank you for the referral. If we can be of further assistance, please feel free to contact the Cardiopulmonary Rehab staff at [phone].    Sincerely,    PREETI Randall, RN  Cardiopulmonary Rehabilitation Nurse Liaison  [facility]

## 2025-05-27 ENCOUNTER — HOSPITAL ENCOUNTER (EMERGENCY)
Age: 60
Discharge: HOME | End: 2025-05-27
Attending: EMERGENCY MEDICINE
Payer: COMMERCIAL

## 2025-05-27 ENCOUNTER — HOSPITAL ENCOUNTER (OUTPATIENT)
Dept: CARDIAC REHAB | Age: 60
Setting detail: RECURRING SERIES
Discharge: HOME | End: 2025-05-30
Payer: COMMERCIAL

## 2025-05-27 ENCOUNTER — APPOINTMENT (OUTPATIENT)
Dept: GENERAL RADIOLOGY | Age: 60
End: 2025-05-27
Payer: COMMERCIAL

## 2025-05-27 VITALS
DIASTOLIC BLOOD PRESSURE: 70 MMHG | HEART RATE: 80 BPM | TEMPERATURE: 98 F | OXYGEN SATURATION: 95 % | SYSTOLIC BLOOD PRESSURE: 102 MMHG | RESPIRATION RATE: 17 BRPM

## 2025-05-27 DIAGNOSIS — I95.9 HYPOTENSION, UNSPECIFIED HYPOTENSION TYPE: ICD-10-CM

## 2025-05-27 DIAGNOSIS — R53.83 OTHER FATIGUE: Primary | ICD-10-CM

## 2025-05-27 DIAGNOSIS — I95.1 ORTHOSTATIC HYPOTENSION: ICD-10-CM

## 2025-05-27 LAB
ALBUMIN SERPL-MCNC: 4.3 G/DL (ref 3.2–4.6)
ALBUMIN/GLOB SERPL: 1.3 (ref 1–1.9)
ALP SERPL-CCNC: 61 U/L (ref 35–104)
ALT SERPL-CCNC: 16 U/L (ref 12–65)
ANION GAP BLD CALC-SCNC: 10.6 MMOL/L
AST SERPL-CCNC: 18 U/L (ref 15–37)
BASOPHILS # BLD: 0.03 K/UL (ref 0–0.2)
BASOPHILS NFR BLD: 0.4 % (ref 0–2)
BILIRUB DIRECT SERPL-MCNC: 0.1 MG/DL (ref 0–0.3)
BILIRUB SERPL-MCNC: 0.2 MG/DL (ref 0–1.2)
BUN BLD-MCNC: 23 MG/DL (ref 8–26)
CHLORIDE BLD-SCNC: 106 MMOL/L (ref 98–107)
CO2 BLD-SCNC: 24.4 MMOL/L (ref 21–32)
CREAT BLD-MCNC: 0.73 MG/DL (ref 0.8–1.5)
DIFFERENTIAL METHOD BLD: ABNORMAL
EOSINOPHIL # BLD: 0.15 K/UL (ref 0–0.8)
EOSINOPHIL NFR BLD: 2.2 % (ref 0.5–7.8)
ERYTHROCYTE [DISTWIDTH] IN BLOOD BY AUTOMATED COUNT: 13.2 % (ref 11.9–14.6)
GLOBULIN SER CALC-MCNC: 3.4 G/DL (ref 2.3–3.5)
GLUCOSE BLD-MCNC: 104 MG/DL (ref 65–100)
HCT VFR BLD AUTO: 36.7 % (ref 35.8–46.3)
HGB BLD-MCNC: 12.5 G/DL (ref 11.7–15.4)
IMM GRANULOCYTES # BLD AUTO: 0.02 K/UL (ref 0–0.5)
IMM GRANULOCYTES NFR BLD AUTO: 0.3 % (ref 0–5)
LYMPHOCYTES # BLD: 1.47 K/UL (ref 0.5–4.6)
LYMPHOCYTES NFR BLD: 21.4 % (ref 13–44)
MCH RBC QN AUTO: 32.4 PG (ref 26.1–32.9)
MCHC RBC AUTO-ENTMCNC: 34.1 G/DL (ref 31.4–35)
MCV RBC AUTO: 95.1 FL (ref 82–102)
MONOCYTES # BLD: 0.6 K/UL (ref 0.1–1.3)
MONOCYTES NFR BLD: 8.7 % (ref 4–12)
NEUTS SEG # BLD: 4.61 K/UL (ref 1.7–8.2)
NEUTS SEG NFR BLD: 67 % (ref 43–78)
NRBC # BLD: 0 K/UL (ref 0–0.2)
PLATELET # BLD AUTO: 262 K/UL (ref 150–450)
PMV BLD AUTO: 10 FL (ref 9.4–12.3)
POTASSIUM BLD-SCNC: 3.7 MMOL/L (ref 3.5–5.1)
PROT SERPL-MCNC: 7.7 G/DL (ref 6.3–8.2)
RBC # BLD AUTO: 3.86 M/UL (ref 4.05–5.2)
SODIUM BLD-SCNC: 141 MMOL/L (ref 136–145)
TROPONIN T SERPL HS-MCNC: 12.8 NG/L (ref 0–14)
TROPONIN T SERPL HS-MCNC: 14.7 NG/L (ref 0–14)
WBC # BLD AUTO: 6.9 K/UL (ref 4.3–11.1)

## 2025-05-27 PROCEDURE — 2580000003 HC RX 258: Performed by: EMERGENCY MEDICINE

## 2025-05-27 PROCEDURE — 80047 BASIC METABLC PNL IONIZED CA: CPT

## 2025-05-27 PROCEDURE — 85025 COMPLETE CBC W/AUTO DIFF WBC: CPT

## 2025-05-27 PROCEDURE — 71045 X-RAY EXAM CHEST 1 VIEW: CPT

## 2025-05-27 PROCEDURE — 99285 EMERGENCY DEPT VISIT HI MDM: CPT

## 2025-05-27 PROCEDURE — 93798 PHYS/QHP OP CAR RHAB W/ECG: CPT

## 2025-05-27 PROCEDURE — 80076 HEPATIC FUNCTION PANEL: CPT

## 2025-05-27 PROCEDURE — 84484 ASSAY OF TROPONIN QUANT: CPT

## 2025-05-27 PROCEDURE — 93005 ELECTROCARDIOGRAM TRACING: CPT | Performed by: EMERGENCY MEDICINE

## 2025-05-27 RX ORDER — 0.9 % SODIUM CHLORIDE 0.9 %
1000 INTRAVENOUS SOLUTION INTRAVENOUS ONCE
Status: COMPLETED | OUTPATIENT
Start: 2025-05-27 | End: 2025-05-27

## 2025-05-27 RX ADMIN — SODIUM CHLORIDE 1000 ML: 0.9 INJECTION, SOLUTION INTRAVENOUS at 17:17

## 2025-05-27 ASSESSMENT — PAIN - FUNCTIONAL ASSESSMENT: PAIN_FUNCTIONAL_ASSESSMENT: 0-10

## 2025-05-27 ASSESSMENT — PAIN SCALES - GENERAL: PAINLEVEL_OUTOF10: 0

## 2025-05-27 NOTE — ED PROVIDER NOTES
Emergency Department Provider Note       S EMERGENCY DEPT   PCP: None, None   Age: 59 y.o.   Sex: female     DISPOSITION Decision To Discharge 05/27/2025 07:58:50 PM    ICD-10-CM    1. Other fatigue  R53.83       2. Hypotension, unspecified hypotension type  I95.9 Aldo Osman MD, Cardiology, [place]      3. Orthostatic hypotension  I95.1 Aldo Osman MD, Cardiology, [place]          Medical Decision Making     59-year-old female with history of CAD status post CABG x 3 on/14/25, T2DM, history of cocaine use, GERD, hyperlipidemia, osteoarthritis, anxiety presents to ED with complaint of intermittent fatigue, lightheadedness, hypotension that has been present over the past 2 to 3 weeks.  Patient states that she has been compliant with her metoprolol 12.5 mg twice daily and losartan 25 mg daily.  Patient states that she was at cardiac rehab today and was told by therapist that her blood pressure was low but still let her workout and complete her session.    Patient denies any active chest pain or shortness of breath at this time.  Differential diagnosis includes but is not limited to dehydration, VIKI, orthostatic hypotension, medication side effect, ACS, pneumonia, volume overload, CHF, electrolyte abnormality, etc.  WBC 6.9.  H&H stable at 12.5/36.7.  Glucose 104.  Initial troponin 12.8.  Repeat troponin stable at 14.7.  Chest x-ray with chronic interstitial changes without superimposed acute pulmonary process noted.  EKG with normal sinus rhythm.  No ST elevation.   orthostatics negative.  Patient given 1 L IV fluid bolus.    most recent blood pressure of 106/48.  Pulse 72.      Case discussed with Dr. Linares with [facility] cardiology who recommends holding both metoprolol and losartan at this time and need for close outpatient follow-up.  Patient and family were updated on plan for patient to stop taking metoprolol as well as losartan and for her to closely monitor her blood pressure at home

## 2025-05-27 NOTE — ED TRIAGE NOTES
Pt to the ED from home with c/o of hypotension inter since having open heart surgery about 6 weeks ago. Pt states that the she was at cardiac rehab and was \"told it was low\", but still let her work out. Pt states that this happened today around 2ish. Pt states that she does not able to check her BP when this happens, just feels funny.

## 2025-05-27 NOTE — ED NOTES
Orthostatic Vitals:      5/27/2025     5:06 PM   Orthostatic Vitals   Blood Pressure Lying 111/69   Pulse Lying 78 PER MINUTE   Blood Pressure Sitting 109/62   Pulse Sitting 76 PER MINUTE   Blood Pressure Standing 100/69   Pulse Standing 83 PER MINUTE

## 2025-05-28 ENCOUNTER — HOSPITAL ENCOUNTER (OUTPATIENT)
Dept: CARDIAC REHAB | Age: 60
Setting detail: RECURRING SERIES
Discharge: HOME | End: 2025-05-31
Payer: COMMERCIAL

## 2025-05-28 LAB
EKG ATRIAL RATE: 70 BPM
EKG DIAGNOSIS: NORMAL
EKG P AXIS: 64 DEGREES
EKG P-R INTERVAL: 142 MS
EKG Q-T INTERVAL: 375 MS
EKG QRS DURATION: 94 MS
EKG QTC CALCULATION (BAZETT): 402 MS
EKG R AXIS: 53 DEGREES
EKG T AXIS: 60 DEGREES
EKG VENTRICULAR RATE: 69 BPM

## 2025-05-28 PROCEDURE — 93010 ELECTROCARDIOGRAM REPORT: CPT | Performed by: INTERNAL MEDICINE

## 2025-05-28 PROCEDURE — 93798 PHYS/QHP OP CAR RHAB W/ECG: CPT

## 2025-05-28 NOTE — DISCHARGE INSTRUCTIONS
Stop taking both metoprolol and losartan.  Closely monitor blood pressure at home.  Schedule close follow-up with your cardiologist within the next 1 to 2 days.  Please return to ED if symptoms worsen or progress in any way including dizziness, weakness, fatigue or if you develop any chest pain, shortness of breath, etc.

## 2025-05-28 NOTE — ED NOTES
Patient mobility status  with mild difficulty.     I have reviewed discharge instructions with the patient.  The patient verbalized understanding.    Patient left ED via Discharge Method: wheelchair to Home with Parent.    Opportunity for questions and clarification provided.     Patient given 0 scripts.

## 2025-05-30 ENCOUNTER — HOSPITAL ENCOUNTER (OUTPATIENT)
Dept: CARDIAC REHAB | Age: 60
Setting detail: RECURRING SERIES
End: 2025-05-30
Payer: COMMERCIAL

## 2025-06-01 NOTE — PROGRESS NOTES
CARDIOLOGY CLINIC FOLLOW-UP    Date: 6/2/2025  Patient's Primary Care Physician:  None, None  Referring Physician:  Jian Holder*     Subjective     Reason for Visit: follow-up of CAD status post CABG     History of Present Illness   Ms. Quijano is a 59 y.o. female with history of coronary artery disease status post CABG (4/25), non-insulin-dependent type 2 diabetes, cocaine abuse, hyperlipidemia, who returns for routine follow-up.     At our last visit in April she was recently post CABG, and her anginal equivalent had not recurred.  Interval hospitalizations/emergencies: Seen in the emergency department last week for borderline hypotension with home diastolics in the 50s, associated with mild light-headedness, no other pertinent positives. Home antihypertensives were held.    In the office today, she denies chest pain, apart from positional sternotomy discomfort.  She denies exertional dyspnea.  Still with occasional episodes of diastolic blood pressures between 50 and 60.  She does not hydrate well.  No other complaints.    Cardiovascular testing:  - C, 4/11/25: Critical left main stenosis,  of the RCA collateralized by a conus branch, critical ostial and severe mid LAD stenoses, and moderate diffuse disease of the proximal through mid circumflex. LVEF 50-55%. Referred for CABG.  - TTE 4/11/25: Normal LV/RV size/function, mild AR, MR, and TR  - Carotid US 4/11/25: moderate stenosis, L carotid bulb    ECG: Normal sinus rhythm, normal intervals, no pathologic Q waves, no ischemic ST or T wave changes (4/16/2025)     Review of Systems   Cardiovascular review of systems negative accept as above.    Past Medical History:   Diagnosis Date    CAD (coronary artery disease)     Diabetes mellitus (HCC)     Hyperlipidemia     Hypertension       Past Surgical History:   Procedure Laterality Date    CARDIAC PROCEDURE N/A 4/11/2025    Left heart cath / coronary angiography performed by Ryan Anton MD at [facility]

## 2025-06-02 ENCOUNTER — OFFICE VISIT (OUTPATIENT)
Age: 60
End: 2025-06-02
Payer: COMMERCIAL

## 2025-06-02 ENCOUNTER — HOSPITAL ENCOUNTER (OUTPATIENT)
Dept: CARDIAC REHAB | Age: 60
Setting detail: RECURRING SERIES
Discharge: HOME | End: 2025-06-05
Payer: COMMERCIAL

## 2025-06-02 VITALS
WEIGHT: 121.2 LBS | HEART RATE: 74 BPM | SYSTOLIC BLOOD PRESSURE: 110 MMHG | DIASTOLIC BLOOD PRESSURE: 70 MMHG | HEIGHT: 57 IN | BODY MASS INDEX: 26.15 KG/M2

## 2025-06-02 DIAGNOSIS — I25.10 CORONARY ARTERY DISEASE INVOLVING NATIVE CORONARY ARTERY OF NATIVE HEART WITHOUT ANGINA PECTORIS: Primary | ICD-10-CM

## 2025-06-02 DIAGNOSIS — Z95.1 STATUS POST CORONARY ARTERY BYPASS GRAFT: ICD-10-CM

## 2025-06-02 DIAGNOSIS — I10 PRIMARY HYPERTENSION: ICD-10-CM

## 2025-06-02 PROCEDURE — 3074F SYST BP LT 130 MM HG: CPT | Performed by: INTERNAL MEDICINE

## 2025-06-02 PROCEDURE — 99214 OFFICE O/P EST MOD 30 MIN: CPT | Performed by: INTERNAL MEDICINE

## 2025-06-02 PROCEDURE — 3078F DIAST BP <80 MM HG: CPT | Performed by: INTERNAL MEDICINE

## 2025-06-02 PROCEDURE — G2211 COMPLEX E/M VISIT ADD ON: HCPCS | Performed by: INTERNAL MEDICINE

## 2025-06-02 PROCEDURE — 93798 PHYS/QHP OP CAR RHAB W/ECG: CPT

## 2025-06-04 ENCOUNTER — HOSPITAL ENCOUNTER (OUTPATIENT)
Dept: CARDIAC REHAB | Age: 60
Setting detail: RECURRING SERIES
Discharge: HOME | End: 2025-06-07
Payer: COMMERCIAL

## 2025-06-04 PROCEDURE — 93798 PHYS/QHP OP CAR RHAB W/ECG: CPT

## 2025-06-06 ENCOUNTER — HOSPITAL ENCOUNTER (OUTPATIENT)
Dept: CARDIAC REHAB | Age: 60
Setting detail: RECURRING SERIES
Discharge: HOME | End: 2025-06-09
Payer: COMMERCIAL

## 2025-06-06 PROCEDURE — 93798 PHYS/QHP OP CAR RHAB W/ECG: CPT

## 2025-06-09 ENCOUNTER — HOSPITAL ENCOUNTER (OUTPATIENT)
Dept: CARDIAC REHAB | Age: 60
Setting detail: RECURRING SERIES
Discharge: HOME | End: 2025-06-12
Payer: COMMERCIAL

## 2025-06-09 PROCEDURE — 93798 PHYS/QHP OP CAR RHAB W/ECG: CPT

## 2025-06-11 ENCOUNTER — HOSPITAL ENCOUNTER (OUTPATIENT)
Dept: CARDIAC REHAB | Age: 60
Setting detail: RECURRING SERIES
Discharge: HOME | End: 2025-06-14
Payer: COMMERCIAL

## 2025-06-11 PROCEDURE — 93798 PHYS/QHP OP CAR RHAB W/ECG: CPT

## 2025-06-13 ENCOUNTER — HOSPITAL ENCOUNTER (OUTPATIENT)
Dept: CARDIAC REHAB | Age: 60
Setting detail: RECURRING SERIES
Discharge: HOME | End: 2025-06-16
Payer: COMMERCIAL

## 2025-06-13 PROCEDURE — 93798 PHYS/QHP OP CAR RHAB W/ECG: CPT

## 2025-06-18 ENCOUNTER — HOSPITAL ENCOUNTER (OUTPATIENT)
Dept: CARDIAC REHAB | Age: 60
Setting detail: RECURRING SERIES
Discharge: HOME | End: 2025-06-21
Payer: COMMERCIAL

## 2025-06-18 PROCEDURE — 93798 PHYS/QHP OP CAR RHAB W/ECG: CPT

## 2025-06-19 ENCOUNTER — TELEPHONE (OUTPATIENT)
Dept: PRIMARY CARE CLINIC | Facility: CLINIC | Age: 60
End: 2025-06-19

## 2025-06-19 ENCOUNTER — OFFICE VISIT (OUTPATIENT)
Dept: PRIMARY CARE CLINIC | Facility: CLINIC | Age: 60
End: 2025-06-19
Payer: COMMERCIAL

## 2025-06-19 VITALS
TEMPERATURE: 97.3 F | HEIGHT: 58 IN | DIASTOLIC BLOOD PRESSURE: 71 MMHG | OXYGEN SATURATION: 97 % | HEART RATE: 77 BPM | WEIGHT: 122.13 LBS | SYSTOLIC BLOOD PRESSURE: 107 MMHG | BODY MASS INDEX: 25.64 KG/M2

## 2025-06-19 DIAGNOSIS — Z13.1 SCREENING FOR DIABETES MELLITUS: ICD-10-CM

## 2025-06-19 DIAGNOSIS — Z13.21 SCREENING FOR ENDOCRINE, NUTRITIONAL, METABOLIC AND IMMUNITY DISORDER: ICD-10-CM

## 2025-06-19 DIAGNOSIS — E11.59 TYPE 2 DIABETES MELLITUS WITH OTHER CIRCULATORY COMPLICATION, WITHOUT LONG-TERM CURRENT USE OF INSULIN (HCC): ICD-10-CM

## 2025-06-19 DIAGNOSIS — Z13.0 SCREENING FOR DEFICIENCY ANEMIA: ICD-10-CM

## 2025-06-19 DIAGNOSIS — E55.9 VITAMIN D DEFICIENCY: ICD-10-CM

## 2025-06-19 DIAGNOSIS — Z13.0 SCREENING FOR ENDOCRINE, NUTRITIONAL, METABOLIC AND IMMUNITY DISORDER: ICD-10-CM

## 2025-06-19 DIAGNOSIS — Z85.41 HISTORY OF CERVICAL CANCER: Primary | ICD-10-CM

## 2025-06-19 DIAGNOSIS — Z13.228 SCREENING FOR ENDOCRINE, NUTRITIONAL, METABOLIC AND IMMUNITY DISORDER: ICD-10-CM

## 2025-06-19 DIAGNOSIS — I25.10 CAD IN NATIVE ARTERY: ICD-10-CM

## 2025-06-19 DIAGNOSIS — R87.612 LGSIL ON PAP SMEAR OF CERVIX: ICD-10-CM

## 2025-06-19 DIAGNOSIS — Z13.29 SCREENING FOR ENDOCRINE, NUTRITIONAL, METABOLIC AND IMMUNITY DISORDER: ICD-10-CM

## 2025-06-19 DIAGNOSIS — Z13.29 SCREENING FOR THYROID DISORDER: ICD-10-CM

## 2025-06-19 PROBLEM — R09.02 HYPOXEMIA: Status: RESOLVED | Noted: 2025-04-14 | Resolved: 2025-06-19

## 2025-06-19 PROBLEM — J90 PLEURAL EFFUSION: Status: RESOLVED | Noted: 2025-04-17 | Resolved: 2025-06-19

## 2025-06-19 PROBLEM — E78.2 MIXED HYPERLIPIDEMIA: Status: RESOLVED | Noted: 2025-04-17 | Resolved: 2025-06-19

## 2025-06-19 PROBLEM — R07.9 CHEST PAIN: Status: RESOLVED | Noted: 2025-04-10 | Resolved: 2025-06-19

## 2025-06-19 PROBLEM — J98.11 ATELECTASIS: Status: RESOLVED | Noted: 2025-04-14 | Resolved: 2025-06-19

## 2025-06-19 LAB
25(OH)D3 SERPL-MCNC: 26.1 NG/ML (ref 30–100)
ALBUMIN SERPL-MCNC: 3.8 G/DL (ref 3.2–4.6)
ALBUMIN/GLOB SERPL: 1 (ref 1–1.9)
ALP SERPL-CCNC: 59 U/L (ref 35–104)
ALT SERPL-CCNC: 17 U/L (ref 8–45)
ANION GAP SERPL CALC-SCNC: 14 MMOL/L (ref 7–16)
AST SERPL-CCNC: 31 U/L (ref 15–37)
BASOPHILS # BLD: 0.02 K/UL (ref 0–0.2)
BASOPHILS NFR BLD: 0.3 % (ref 0–2)
BILIRUB SERPL-MCNC: 0.3 MG/DL (ref 0–1.2)
BUN SERPL-MCNC: 18 MG/DL (ref 8–23)
CALCIUM SERPL-MCNC: 9.9 MG/DL (ref 8.8–10.2)
CHLORIDE SERPL-SCNC: 105 MMOL/L (ref 98–107)
CO2 SERPL-SCNC: 19 MMOL/L (ref 20–29)
CREAT SERPL-MCNC: 0.74 MG/DL (ref 0.6–1.1)
DIFFERENTIAL METHOD BLD: NORMAL
EOSINOPHIL # BLD: 0.09 K/UL (ref 0–0.8)
EOSINOPHIL NFR BLD: 1.3 % (ref 0.5–7.8)
ERYTHROCYTE [DISTWIDTH] IN BLOOD BY AUTOMATED COUNT: 13.4 % (ref 11.9–14.6)
EST. AVERAGE GLUCOSE BLD GHB EST-MCNC: 126 MG/DL
GLOBULIN SER CALC-MCNC: 3.9 G/DL (ref 2.3–3.5)
GLUCOSE SERPL-MCNC: 131 MG/DL (ref 70–99)
HBA1C MFR BLD: 6 % (ref 0–5.6)
HCT VFR BLD AUTO: 40.2 % (ref 35.8–46.3)
HGB BLD-MCNC: 13.2 G/DL (ref 11.7–15.4)
IMM GRANULOCYTES # BLD AUTO: 0.03 K/UL (ref 0–0.5)
IMM GRANULOCYTES NFR BLD AUTO: 0.4 % (ref 0–5)
LYMPHOCYTES # BLD: 1.77 K/UL (ref 0.5–4.6)
LYMPHOCYTES NFR BLD: 25 % (ref 13–44)
MCH RBC QN AUTO: 31.7 PG (ref 26.1–32.9)
MCHC RBC AUTO-ENTMCNC: 32.8 G/DL (ref 31.4–35)
MCV RBC AUTO: 96.6 FL (ref 82–102)
MONOCYTES # BLD: 0.61 K/UL (ref 0.1–1.3)
MONOCYTES NFR BLD: 8.6 % (ref 4–12)
NEUTS SEG # BLD: 4.57 K/UL (ref 1.7–8.2)
NEUTS SEG NFR BLD: 64.4 % (ref 43–78)
NRBC # BLD: 0 K/UL (ref 0–0.2)
PLATELET # BLD AUTO: 282 K/UL (ref 150–450)
PMV BLD AUTO: 10.2 FL (ref 9.4–12.3)
POTASSIUM SERPL-SCNC: 4.1 MMOL/L (ref 3.5–5.1)
PROT SERPL-MCNC: 7.7 G/DL (ref 6.3–8.2)
RBC # BLD AUTO: 4.16 M/UL (ref 4.05–5.2)
SODIUM SERPL-SCNC: 138 MMOL/L (ref 136–145)
TSH W FREE THYROID IF ABNORMAL: 1 UIU/ML (ref 0.27–4.2)
WBC # BLD AUTO: 7.1 K/UL (ref 4.3–11.1)

## 2025-06-19 PROCEDURE — 3044F HG A1C LEVEL LT 7.0%: CPT | Performed by: FAMILY MEDICINE

## 2025-06-19 PROCEDURE — 99204 OFFICE O/P NEW MOD 45 MIN: CPT | Performed by: FAMILY MEDICINE

## 2025-06-19 ASSESSMENT — PATIENT HEALTH QUESTIONNAIRE - PHQ9
SUM OF ALL RESPONSES TO PHQ QUESTIONS 1-9: 0
1. LITTLE INTEREST OR PLEASURE IN DOING THINGS: NOT AT ALL
SUM OF ALL RESPONSES TO PHQ QUESTIONS 1-9: 0
2. FEELING DOWN, DEPRESSED OR HOPELESS: NOT AT ALL
SUM OF ALL RESPONSES TO PHQ QUESTIONS 1-9: 0
SUM OF ALL RESPONSES TO PHQ QUESTIONS 1-9: 0

## 2025-06-19 NOTE — TELEPHONE ENCOUNTER
Patient stated that she was seen today by Dr. Uribe told her that she has cervical cancer today and she wants to know how she could say that and she has not done any tests on her. She would like a call back today at [phone] because she is a very anxious and nervous person.  I asked patient if she could possible have just been told that she needs to be screened for cervical cancer and she stated \"no\", she was told that she has cervical cancer.

## 2025-06-19 NOTE — PROGRESS NOTES
Deb Quijano (: 1965) presents today c/o    Chief Complaint   Patient presents with    New Patient     New patient in practice    Other     Patient had recent heart surgery and states her BP has been low since. It is worse when she is walking. Saw Cardiology and they stopped her BP medication but states this did not improve symptoms. Stopped it around 2-3 weeks ago.    Drug Problem     Patient states she was actively using Cocaine until her surgery and has since stopped       59-year-old new patient to our practice presented to establish care, she has hard of hearing very hard to communicate, according to patient she will have hearing aids but has not worked for her.  Have coronary artery disease and CABG x 3, last CABG was done 2025, she just had follow-up with a cardiologist she is only on 3 medicine clopidogrel ,atorvastatin and aspirin.  During hospital stay she was told that she may have diabetes and need to go to PCP for further evaluation however her chart records show that she have history of diabetes but no medicine to be found her last A1c was about 2 to 3 months ago and A1c was 6.6.  She denies any polyuria polydipsia polyphagia.  She have dyslipidemia taking atorvastatin started by cardiology.  She had colectomy due to rectal prolapse,  Patient unable to tell that if she still have a uterus according to patient she has ovary removed but not uterus but record shows that she had abdominal hysterectomy total,  Also she have LGSIL, and uterine prolapse , to record in the chart, she  underwent a total abdominal hysterectomy/bilateral salpingo-oophorectomy in conjunction with an LAR/rectopexy in 2017, she had colonoscopy at same time . .  She lives with her mother and daughter her   few years ago.  She is non-smoker but admits to cocaine use since she is out of hospital 2025 did not use cocaine.  Due for mammogram information given to call make appointment for the mobile

## 2025-06-20 ENCOUNTER — RESULTS FOLLOW-UP (OUTPATIENT)
Dept: PRIMARY CARE CLINIC | Facility: CLINIC | Age: 60
End: 2025-06-20

## 2025-06-20 ENCOUNTER — HOSPITAL ENCOUNTER (OUTPATIENT)
Dept: CARDIAC REHAB | Age: 60
Setting detail: RECURRING SERIES
Discharge: HOME | End: 2025-06-23
Payer: COMMERCIAL

## 2025-06-20 PROCEDURE — 93798 PHYS/QHP OP CAR RHAB W/ECG: CPT

## 2025-06-23 ENCOUNTER — HOSPITAL ENCOUNTER (OUTPATIENT)
Dept: CARDIAC REHAB | Age: 60
Setting detail: RECURRING SERIES
Discharge: HOME | End: 2025-06-26
Payer: COMMERCIAL

## 2025-06-23 PROCEDURE — 93798 PHYS/QHP OP CAR RHAB W/ECG: CPT

## 2025-06-25 ENCOUNTER — HOSPITAL ENCOUNTER (OUTPATIENT)
Dept: CARDIAC REHAB | Age: 60
Setting detail: RECURRING SERIES
Discharge: HOME | End: 2025-06-28
Payer: COMMERCIAL

## 2025-06-25 ENCOUNTER — TELEPHONE (OUTPATIENT)
Dept: PRIMARY CARE CLINIC | Facility: CLINIC | Age: 60
End: 2025-06-25

## 2025-06-25 PROCEDURE — 93798 PHYS/QHP OP CAR RHAB W/ECG: CPT

## 2025-06-25 NOTE — TELEPHONE ENCOUNTER
Unable to contact patient. Scheduled for VV. She needs in office visit with family member in attendance to discuss labs.

## 2025-06-26 ENCOUNTER — TRANSCRIBE ORDERS (OUTPATIENT)
Facility: HOSPITAL | Age: 60
End: 2025-06-26

## 2025-06-26 ENCOUNTER — OFFICE VISIT (OUTPATIENT)
Dept: PRIMARY CARE CLINIC | Facility: CLINIC | Age: 60
End: 2025-06-26
Payer: COMMERCIAL

## 2025-06-26 VITALS
OXYGEN SATURATION: 98 % | WEIGHT: 122.38 LBS | TEMPERATURE: 97.3 F | SYSTOLIC BLOOD PRESSURE: 131 MMHG | BODY MASS INDEX: 25.69 KG/M2 | HEIGHT: 58 IN | DIASTOLIC BLOOD PRESSURE: 71 MMHG | HEART RATE: 77 BPM

## 2025-06-26 DIAGNOSIS — E11.59 TYPE 2 DIABETES MELLITUS WITH OTHER CIRCULATORY COMPLICATION, WITHOUT LONG-TERM CURRENT USE OF INSULIN (HCC): ICD-10-CM

## 2025-06-26 DIAGNOSIS — E55.9 VITAMIN D DEFICIENCY: ICD-10-CM

## 2025-06-26 DIAGNOSIS — R73.03 PRE-DIABETES: Primary | ICD-10-CM

## 2025-06-26 DIAGNOSIS — Z12.31 ENCOUNTER FOR SCREENING MAMMOGRAM FOR MALIGNANT NEOPLASM OF BREAST: Primary | ICD-10-CM

## 2025-06-26 DIAGNOSIS — Z79.899 ENCOUNTER FOR LONG-TERM (CURRENT) USE OF MEDICATIONS: ICD-10-CM

## 2025-06-26 DIAGNOSIS — E78.2 MIXED HYPERLIPIDEMIA: ICD-10-CM

## 2025-06-26 LAB
CREAT UR-MCNC: 46.9 MG/DL (ref 28–217)
MICROALBUMIN UR-MCNC: <1.2 MG/DL (ref 0–20)
MICROALBUMIN/CREAT UR-RTO: NORMAL MG/G (ref 0–30)

## 2025-06-26 PROCEDURE — 99214 OFFICE O/P EST MOD 30 MIN: CPT | Performed by: FAMILY MEDICINE

## 2025-06-26 RX ORDER — ACETAMINOPHEN 160 MG
2000 TABLET,DISINTEGRATING ORAL DAILY
Qty: 90 CAPSULE | Refills: 2 | Status: SHIPPED | OUTPATIENT
Start: 2025-06-26 | End: 2025-09-24

## 2025-06-26 RX ORDER — ATORVASTATIN CALCIUM 80 MG/1
80 TABLET, FILM COATED ORAL NIGHTLY
Qty: 90 TABLET | Refills: 3 | Status: CANCELLED | OUTPATIENT
Start: 2025-06-26

## 2025-06-26 RX ORDER — CLOPIDOGREL BISULFATE 75 MG/1
75 TABLET ORAL DAILY
Qty: 30 TABLET | Refills: 11 | Status: CANCELLED | OUTPATIENT
Start: 2025-06-26

## 2025-06-26 NOTE — ASSESSMENT & PLAN NOTE
Pt was explained in detail about different types of cholesterols,need to avoid fatty foods,fried foods,red meat and sweets. Increased intake of vegetables,lean meats,and reduced intake of carbs was emphasized.   The importance of exercise and weight loss in prevention of cardiovascular disease was explained. Advised at least 20 mins of exercise daily,as tolerated,and to seek my attention if experiencing any cardiovascular symptoms with exercise.    Orders:    Comprehensive Metabolic Panel; Future    Lipid Panel; Future

## 2025-06-26 NOTE — ASSESSMENT & PLAN NOTE
Orders:    vitamin D (VITAMIN D3) 50 MCG (2000 UT) CAPS capsule; Take 1 capsule by mouth daily

## 2025-06-26 NOTE — ASSESSMENT & PLAN NOTE
Diabetes (Patient Education)    Weight loss through dietary modification can improve many aspects of type 2 diabetes including glycemic control and hypertension. The improvement in glycemic control is related to both the degree caloric restriction and weight reduction. 150 minutes of intense aerobic exercise per week is shown to assist in diabetes management. Management of diabetes is good for long term health. Diabetes if shown to set one up for cardiac disease, high blood pressure, high cholesterol, and erectile dysfunction in men. Low fat diet is shown to assist in control. Patient advised to bring glucose log to next visit. Patient advised to monitor feet daily for wounds.    Utilize plate method for at least one meal each day. Fill 1/2 of your plate with non-starchy fruits and vegetables such as lettuce spinach and broccoli. Fill 1/4 of your plate with starches, such as bread rice pasta cereal or potatoes. Fill 1/4 of your plate with protein such as beef, chicken, turkey, etc.    Try to start exercising even if it is as simple as walking 30 minutes 2-3 times per week. If you experience any kind of pain in your knees or hips try something with less resistant such as biking or swimming.         Orders:    Hemoglobin A1C; Future    Comprehensive Metabolic Panel; Future

## 2025-06-26 NOTE — PROGRESS NOTES
Deb Quijano (: 1965) presents today c/o    Chief Complaint   Patient presents with    Discuss Labs     Patient in office to discuss labs, denies secondary concerns. Needs refills     Other     Scheduled for Mammogram        59-year-old female with history of coronary artery disease CABG x 3  She is here for lab discussion her mother also came with her as she had hard of hearing even with hearing aid.  Her all labs CBC CMP TSH was normal lipid panel was not done as she was not fasting, she is taking atorvastatin started by cardiology recently  Her A1c was 6 she is prediabetic she is not on any medicine currently.  Her vitamin D is low she is taking over-the-counter vitamin D but not consistently.  No other concern or question  She have history of cervical cancer [facility] but had total hysterectomy done no follow-up since then.           Reviewed and updated this visit by provider:  Tobacco  Allergies  Meds  Problems  Med Hx  Surg Hx  Fam Hx         Review of Systems   All other systems reviewed and are negative.       Visit Vitals  /71 (BP Site: Left Upper Arm, Patient Position: Sitting)   Pulse 77   Temp 97.3 °F (36.3 °C) (Temporal)   Ht 1.461 m (4' 9.52\")   Wt 55.5 kg (122 lb 6 oz)   SpO2 98%   BMI 26.01 kg/m²       Physical Exam  Vitals and nursing note reviewed.   Constitutional:       General: She is not in acute distress.     Appearance: Normal appearance. She is not ill-appearing, toxic-appearing or diaphoretic.      Comments: Have severe hard of hearing communicate with her both verbally and in written   HENT:      Head: Normocephalic and atraumatic.      Right Ear: Tympanic membrane, ear canal and external ear normal.      Left Ear: Tympanic membrane, ear canal and external ear normal.      Nose: Nose normal.      Mouth/Throat:      Mouth: Mucous membranes are moist.   Eyes:      General: No scleral icterus.     Conjunctiva/sclera: Conjunctivae normal.   Neck:      Vascular: No

## 2025-06-27 ENCOUNTER — HOSPITAL ENCOUNTER (OUTPATIENT)
Dept: CARDIAC REHAB | Age: 60
Setting detail: RECURRING SERIES
Discharge: HOME | End: 2025-06-30
Payer: COMMERCIAL

## 2025-06-27 PROCEDURE — 93798 PHYS/QHP OP CAR RHAB W/ECG: CPT

## 2025-06-30 ENCOUNTER — HOSPITAL ENCOUNTER (OUTPATIENT)
Dept: CARDIAC REHAB | Age: 60
Setting detail: RECURRING SERIES
End: 2025-06-30
Payer: COMMERCIAL

## 2025-06-30 ENCOUNTER — FOLLOWUP TELEPHONE ENCOUNTER (OUTPATIENT)
Dept: DIABETES SERVICES | Age: 60
End: 2025-06-30

## 2025-06-30 NOTE — TELEPHONE ENCOUNTER
Talked with pt regarding prediabetes education. Pt stated her 82 year old mother has to take her to cardiac rehab and she can't take her to another appt. Offered to do 1:1 right before her rehab or right after and pt stated she cannot make her mother wait 2 hrs. Pt politely declined attending education.

## 2025-07-02 ENCOUNTER — APPOINTMENT (OUTPATIENT)
Dept: CARDIAC REHAB | Age: 60
End: 2025-07-02
Payer: COMMERCIAL

## 2025-07-07 ENCOUNTER — HOSPITAL ENCOUNTER (OUTPATIENT)
Dept: CARDIAC REHAB | Age: 60
Setting detail: RECURRING SERIES
Discharge: HOME | End: 2025-07-10
Payer: COMMERCIAL

## 2025-07-07 PROCEDURE — 93798 PHYS/QHP OP CAR RHAB W/ECG: CPT

## 2025-07-09 ENCOUNTER — HOSPITAL ENCOUNTER (OUTPATIENT)
Dept: CARDIAC REHAB | Age: 60
Setting detail: RECURRING SERIES
Discharge: HOME | End: 2025-07-12
Payer: COMMERCIAL

## 2025-07-09 PROCEDURE — 93798 PHYS/QHP OP CAR RHAB W/ECG: CPT

## 2025-07-11 ENCOUNTER — HOSPITAL ENCOUNTER (OUTPATIENT)
Dept: CARDIAC REHAB | Age: 60
Setting detail: RECURRING SERIES
Discharge: HOME | End: 2025-07-14
Payer: COMMERCIAL

## 2025-07-11 PROCEDURE — 93798 PHYS/QHP OP CAR RHAB W/ECG: CPT

## 2025-07-14 ENCOUNTER — HOSPITAL ENCOUNTER (OUTPATIENT)
Dept: CARDIAC REHAB | Age: 60
Setting detail: RECURRING SERIES
Discharge: HOME | End: 2025-07-17
Payer: COMMERCIAL

## 2025-07-14 PROCEDURE — 93798 PHYS/QHP OP CAR RHAB W/ECG: CPT

## 2025-07-16 ENCOUNTER — HOSPITAL ENCOUNTER (OUTPATIENT)
Dept: CARDIAC REHAB | Age: 60
Setting detail: RECURRING SERIES
Discharge: HOME | End: 2025-07-19
Payer: COMMERCIAL

## 2025-07-16 PROCEDURE — 93798 PHYS/QHP OP CAR RHAB W/ECG: CPT

## 2025-07-18 ENCOUNTER — HOSPITAL ENCOUNTER (OUTPATIENT)
Dept: CARDIAC REHAB | Age: 60
Setting detail: RECURRING SERIES
Discharge: HOME | End: 2025-07-21
Payer: COMMERCIAL

## 2025-07-18 PROCEDURE — 93798 PHYS/QHP OP CAR RHAB W/ECG: CPT

## 2025-07-21 ENCOUNTER — HOSPITAL ENCOUNTER (OUTPATIENT)
Dept: CARDIAC REHAB | Age: 60
Setting detail: RECURRING SERIES
Discharge: HOME | End: 2025-07-24
Payer: COMMERCIAL

## 2025-07-21 PROCEDURE — 93798 PHYS/QHP OP CAR RHAB W/ECG: CPT

## 2025-07-23 ENCOUNTER — APPOINTMENT (OUTPATIENT)
Dept: CARDIAC REHAB | Age: 60
End: 2025-07-23
Payer: COMMERCIAL

## 2025-07-25 ENCOUNTER — APPOINTMENT (OUTPATIENT)
Dept: CARDIAC REHAB | Age: 60
End: 2025-07-25
Payer: COMMERCIAL

## 2025-07-28 ENCOUNTER — HOSPITAL ENCOUNTER (OUTPATIENT)
Dept: MAMMOGRAPHY | Age: 60
Discharge: HOME | End: 2025-07-31
Attending: FAMILY MEDICINE

## 2025-07-28 ENCOUNTER — HOSPITAL ENCOUNTER (OUTPATIENT)
Dept: CARDIAC REHAB | Age: 60
Setting detail: RECURRING SERIES
Discharge: HOME | End: 2025-07-31
Payer: COMMERCIAL

## 2025-07-28 DIAGNOSIS — Z12.31 ENCOUNTER FOR SCREENING MAMMOGRAM FOR MALIGNANT NEOPLASM OF BREAST: ICD-10-CM

## 2025-07-28 PROCEDURE — 93798 PHYS/QHP OP CAR RHAB W/ECG: CPT

## 2025-07-30 ENCOUNTER — HOSPITAL ENCOUNTER (OUTPATIENT)
Dept: CARDIAC REHAB | Age: 60
Setting detail: RECURRING SERIES
Discharge: HOME | End: 2025-08-02
Payer: COMMERCIAL

## 2025-07-30 PROCEDURE — 93798 PHYS/QHP OP CAR RHAB W/ECG: CPT

## 2025-08-01 ENCOUNTER — HOSPITAL ENCOUNTER (OUTPATIENT)
Dept: CARDIAC REHAB | Age: 60
Setting detail: RECURRING SERIES
Discharge: HOME | End: 2025-08-04
Payer: COMMERCIAL

## 2025-08-01 PROCEDURE — 93798 PHYS/QHP OP CAR RHAB W/ECG: CPT

## 2025-08-04 ENCOUNTER — HOSPITAL ENCOUNTER (OUTPATIENT)
Dept: CARDIAC REHAB | Age: 60
Setting detail: RECURRING SERIES
Discharge: HOME | End: 2025-08-07
Payer: COMMERCIAL

## 2025-08-04 PROCEDURE — 93798 PHYS/QHP OP CAR RHAB W/ECG: CPT

## 2025-08-06 ENCOUNTER — HOSPITAL ENCOUNTER (OUTPATIENT)
Dept: CARDIAC REHAB | Age: 60
Setting detail: RECURRING SERIES
Discharge: HOME | End: 2025-08-09
Payer: COMMERCIAL

## 2025-08-06 PROCEDURE — 93798 PHYS/QHP OP CAR RHAB W/ECG: CPT

## 2025-08-08 ENCOUNTER — HOSPITAL ENCOUNTER (OUTPATIENT)
Dept: CARDIAC REHAB | Age: 60
Setting detail: RECURRING SERIES
Discharge: HOME | End: 2025-08-11
Payer: COMMERCIAL

## 2025-08-08 PROCEDURE — 93798 PHYS/QHP OP CAR RHAB W/ECG: CPT

## 2025-08-11 ENCOUNTER — HOSPITAL ENCOUNTER (OUTPATIENT)
Dept: CARDIAC REHAB | Age: 60
Setting detail: RECURRING SERIES
Discharge: HOME | End: 2025-08-14
Payer: COMMERCIAL

## 2025-08-11 PROCEDURE — 93798 PHYS/QHP OP CAR RHAB W/ECG: CPT

## 2025-08-13 ENCOUNTER — HOSPITAL ENCOUNTER (OUTPATIENT)
Dept: CARDIAC REHAB | Age: 60
Setting detail: RECURRING SERIES
Discharge: HOME | End: 2025-08-16
Payer: COMMERCIAL

## 2025-08-13 PROCEDURE — 93798 PHYS/QHP OP CAR RHAB W/ECG: CPT

## 2025-08-15 ENCOUNTER — HOSPITAL ENCOUNTER (OUTPATIENT)
Dept: CARDIAC REHAB | Age: 60
Setting detail: RECURRING SERIES
Discharge: HOME | End: 2025-08-18
Payer: COMMERCIAL

## 2025-08-15 PROCEDURE — 93798 PHYS/QHP OP CAR RHAB W/ECG: CPT

## 2025-08-18 ENCOUNTER — HOSPITAL ENCOUNTER (OUTPATIENT)
Dept: CARDIAC REHAB | Age: 60
Setting detail: RECURRING SERIES
Discharge: HOME | End: 2025-08-21
Payer: COMMERCIAL

## 2025-08-18 PROCEDURE — 93798 PHYS/QHP OP CAR RHAB W/ECG: CPT

## 2025-08-20 ENCOUNTER — HOSPITAL ENCOUNTER (OUTPATIENT)
Dept: CARDIAC REHAB | Age: 60
Setting detail: RECURRING SERIES
Discharge: HOME | End: 2025-08-23
Payer: COMMERCIAL

## 2025-08-20 PROCEDURE — 93798 PHYS/QHP OP CAR RHAB W/ECG: CPT

## 2025-08-20 ASSESSMENT — PATIENT HEALTH QUESTIONNAIRE - PHQ9
9. THOUGHTS THAT YOU WOULD BE BETTER OFF DEAD, OR OF HURTING YOURSELF: NOT AT ALL
SUM OF ALL RESPONSES TO PHQ QUESTIONS 1-9: 6
10. IF YOU CHECKED OFF ANY PROBLEMS, HOW DIFFICULT HAVE THESE PROBLEMS MADE IT FOR YOU TO DO YOUR WORK, TAKE CARE OF THINGS AT HOME, OR GET ALONG WITH OTHER PEOPLE: SOMEWHAT DIFFICULT
6. FEELING BAD ABOUT YOURSELF - OR THAT YOU ARE A FAILURE OR HAVE LET YOURSELF OR YOUR FAMILY DOWN: SEVERAL DAYS
7. TROUBLE CONCENTRATING ON THINGS, SUCH AS READING THE NEWSPAPER OR WATCHING TELEVISION: SEVERAL DAYS
SUM OF ALL RESPONSES TO PHQ QUESTIONS 1-9: 6
1. LITTLE INTEREST OR PLEASURE IN DOING THINGS: NOT AT ALL
2. FEELING DOWN, DEPRESSED OR HOPELESS: SEVERAL DAYS
3. TROUBLE FALLING OR STAYING ASLEEP: SEVERAL DAYS
5. POOR APPETITE OR OVEREATING: NOT AT ALL
8. MOVING OR SPEAKING SO SLOWLY THAT OTHER PEOPLE COULD HAVE NOTICED. OR THE OPPOSITE, BEING SO FIGETY OR RESTLESS THAT YOU HAVE BEEN MOVING AROUND A LOT MORE THAN USUAL: SEVERAL DAYS
SUM OF ALL RESPONSES TO PHQ QUESTIONS 1-9: 6
4. FEELING TIRED OR HAVING LITTLE ENERGY: SEVERAL DAYS
SUM OF ALL RESPONSES TO PHQ QUESTIONS 1-9: 6

## 2025-08-25 ENCOUNTER — HOSPITAL ENCOUNTER (OUTPATIENT)
Dept: CARDIAC REHAB | Age: 60
Setting detail: RECURRING SERIES
Discharge: HOME | End: 2025-08-28
Payer: COMMERCIAL

## 2025-08-25 ENCOUNTER — HOSPITAL ENCOUNTER (OUTPATIENT)
Dept: MAMMOGRAPHY | Age: 60
Discharge: HOME | End: 2025-08-28
Attending: FAMILY MEDICINE
Payer: COMMERCIAL

## 2025-08-25 VITALS — HEIGHT: 58 IN | WEIGHT: 122 LBS | BODY MASS INDEX: 25.61 KG/M2

## 2025-08-25 PROCEDURE — 77063 BREAST TOMOSYNTHESIS BI: CPT

## 2025-08-25 PROCEDURE — 93798 PHYS/QHP OP CAR RHAB W/ECG: CPT

## 2025-08-26 ENCOUNTER — RESULTS FOLLOW-UP (OUTPATIENT)
Dept: PRIMARY CARE CLINIC | Facility: CLINIC | Age: 60
End: 2025-08-26

## 2025-08-27 ENCOUNTER — HOSPITAL ENCOUNTER (OUTPATIENT)
Dept: CARDIAC REHAB | Age: 60
Setting detail: RECURRING SERIES
Discharge: HOME | End: 2025-08-30
Payer: COMMERCIAL

## 2025-08-27 PROCEDURE — 93798 PHYS/QHP OP CAR RHAB W/ECG: CPT

## 2025-08-29 ENCOUNTER — HOSPITAL ENCOUNTER (OUTPATIENT)
Dept: CARDIAC REHAB | Age: 60
Setting detail: RECURRING SERIES
Discharge: HOME | End: 2025-09-01
Payer: COMMERCIAL

## 2025-08-29 PROCEDURE — 93798 PHYS/QHP OP CAR RHAB W/ECG: CPT

## 2025-09-02 ENCOUNTER — HOSPITAL ENCOUNTER (OUTPATIENT)
Dept: CARDIAC REHAB | Age: 60
Setting detail: RECURRING SERIES
Discharge: HOME | End: 2025-09-05
Payer: COMMERCIAL

## 2025-09-02 PROCEDURE — 93798 PHYS/QHP OP CAR RHAB W/ECG: CPT

## 2025-09-03 ENCOUNTER — HOSPITAL ENCOUNTER (OUTPATIENT)
Dept: CARDIAC REHAB | Age: 60
Setting detail: RECURRING SERIES
Discharge: HOME | End: 2025-09-06
Payer: COMMERCIAL

## 2025-09-03 PROCEDURE — 93798 PHYS/QHP OP CAR RHAB W/ECG: CPT

## 2025-09-04 ENCOUNTER — OFFICE VISIT (OUTPATIENT)
Age: 60
End: 2025-09-04
Payer: COMMERCIAL

## 2025-09-04 VITALS
BODY MASS INDEX: 25.9 KG/M2 | WEIGHT: 123.4 LBS | SYSTOLIC BLOOD PRESSURE: 112 MMHG | HEART RATE: 70 BPM | HEIGHT: 58 IN | DIASTOLIC BLOOD PRESSURE: 60 MMHG

## 2025-09-04 DIAGNOSIS — Z95.1 STATUS POST CORONARY ARTERY BYPASS GRAFT: ICD-10-CM

## 2025-09-04 DIAGNOSIS — I10 PRIMARY HYPERTENSION: ICD-10-CM

## 2025-09-04 DIAGNOSIS — I25.10 CORONARY ARTERY DISEASE INVOLVING NATIVE CORONARY ARTERY OF NATIVE HEART WITHOUT ANGINA PECTORIS: Primary | ICD-10-CM

## 2025-09-04 PROCEDURE — 3074F SYST BP LT 130 MM HG: CPT | Performed by: INTERNAL MEDICINE

## 2025-09-04 PROCEDURE — 99214 OFFICE O/P EST MOD 30 MIN: CPT | Performed by: INTERNAL MEDICINE

## 2025-09-04 PROCEDURE — G2211 COMPLEX E/M VISIT ADD ON: HCPCS | Performed by: INTERNAL MEDICINE

## 2025-09-04 PROCEDURE — 3078F DIAST BP <80 MM HG: CPT | Performed by: INTERNAL MEDICINE

## 2025-09-04 RX ORDER — ROSUVASTATIN CALCIUM 20 MG/1
20 TABLET, COATED ORAL DAILY
Qty: 90 TABLET | Refills: 1 | Status: SHIPPED | OUTPATIENT
Start: 2025-09-04

## (undated) DEVICE — DRAPE SLUSH DISC W44XL66IN ST FOR RND BSIN HUSH SLUSH SYS

## (undated) DEVICE — SUTURE PROL SZ 3-0 L36IN NONABSORBABLE BLU L26MM SH 1/2 CIR 8522H

## (undated) DEVICE — PERFUSION PACK XCOATING 76320] TERUMO CARDIOVASCULAR]

## (undated) DEVICE — PRESSURE MONITORING SET: Brand: TRUWAVE, VAMP PLUS

## (undated) DEVICE — PERCUTANEOUS INSERTION KIT-ARTERIAL: Brand: PERCUTANEOUS INSERTION KIT-ARTERIAL

## (undated) DEVICE — 1/4 FORCE SURGICAL SPRING CLIP: Brand: STEALTH® SPRING CLIP

## (undated) DEVICE — ELECTRODE PT RET AD L9FT HI MOIST COND ADH HYDRGEL CORDED

## (undated) DEVICE — SYSTEM ENDOSCP VES HARV W/ TOOL CANN SEAL SHT PRT BLNT TIP

## (undated) DEVICE — SUTURE PROL SZ 6-0 L30IN NONABSORBABLE BLU L13MM CC-1 3/8 M8707

## (undated) DEVICE — CANISTER, RIGID, 3000CC: Brand: MEDLINE INDUSTRIES, INC.

## (undated) DEVICE — SUTURE VICRYL + SZ 4-0 L27IN ABSRB UD PS-2 3/8 CIR REV CUT VCP426H

## (undated) DEVICE — SUTURE VICRYL SZ 3-0 L36IN ABSRB UD L36MM CT-1 1/2 CIR J944H

## (undated) DEVICE — CATHETER THOR 32FR L23IN PVC 6 EYELET STR ATRAUM

## (undated) DEVICE — BAND COMPR L21CM SHT CLR PLAS HEMSTAT EXT HK AND LOOP RETEN

## (undated) DEVICE — 1840 FOAM BLOCK NEEDLE COUNTER: Brand: DEVON

## (undated) DEVICE — GLIDESHEATH SLENDER STAINLESS STEEL KIT: Brand: GLIDESHEATH SLENDER

## (undated) DEVICE — Device

## (undated) DEVICE — CARDINAL HEALTH FLEXIBLE LIGHT HANDLE COVER: Brand: CARDINAL HEALTH

## (undated) DEVICE — TTL1LYR 16FR10ML 100%SIL TMPST TR: Brand: MEDLINE

## (undated) DEVICE — SOLUTION IRRIG 1000ML 09% SOD CHL USP PIC PLAS CONTAINER

## (undated) DEVICE — SYRINGE MED 20ML STD CLR PLAS LUERSLIP TIP N CTRL DISP

## (undated) DEVICE — SURGIFOAM SPNG SZ 100

## (undated) DEVICE — SUTURE NONABSORBABLE L24IN SZ 7-0 M0-5 BV175-8 EP 24 BLU M8745

## (undated) DEVICE — SUTURE ETHIBOND EXCEL SZ 0 L18IN NONABSORBABLE GRN L36MM CT-1 CX21D

## (undated) DEVICE — DUAL LUMEN STOMACH TUBE: Brand: SALEM SUMP

## (undated) DEVICE — MPS® DELIVERY SET WITH 6 FT. DELIVERY TUBING: Brand: MPS

## (undated) DEVICE — AUTOTRANSFUSION KIT 120 MH FAST STRT AT1 FOR CATS +

## (undated) DEVICE — CLOTH PRE OP W9XL10.5IN 2% CHG FRAGRANCE RNS FREE READYPREP

## (undated) DEVICE — KIT INCIS MGMT 13CM PEEL AND PLC DISPOSABLE PREVENA

## (undated) DEVICE — ELECTRODE BLDE L6.5IN CAUT EXT DISP

## (undated) DEVICE — CATHETER COR DIAG 4.0 5FR 110CM 2 SIDE H

## (undated) DEVICE — FLOTRAC SENSOR W/ VAMP ADULT (60"/152CM): Brand: FLOTRAC, VAMP

## (undated) DEVICE — SS SUTURE, 3 PER SLEEVE: Brand: MYO/WIRE II

## (undated) DEVICE — SUTURE S STL SZ 6 L18IN NONABSORBABLE SIL L48MM CCS 1/2 CIR M654G

## (undated) DEVICE — BLADE GRINDLESS 6400 BULK 100EA

## (undated) DEVICE — OXYGENATOR FX15 W/O RESERVOIR

## (undated) DEVICE — SUTURE SILK PERMAHAND PRECUT 6 X 30 IN SZ 1 BLK BRAID A307H

## (undated) DEVICE — SUTURE PDS II SZ 1 L36IN ABSRB VLT L48MM CTX 1/2 CIR Z371T

## (undated) DEVICE — CLIP LIG SM TI 20 BLU HNDL FOR OPN AND ENDOSCP SGL APPL

## (undated) DEVICE — SUTURE PROL SZ 4-0 L30IN NONABSORBABLE BLU SH-1 L22MM 1/2 8526H

## (undated) DEVICE — KIT CATH L11.5CM DIA9FR CTRL VEN POLYUR ANTIMIC COAT DBL

## (undated) DEVICE — STERILE HOOK LOCK LATEX FREE ELASTIC BANDAGE 4INX5YD: Brand: HOOK LOCK™

## (undated) DEVICE — CATHETER COR DIAG PIGTAILS PIG 145 CRV 5FR 110CM 6 SIDE H

## (undated) DEVICE — SUTURE PERMAHAND SZ 2-0 L12X18IN NONABSORBABLE BLK SILK A185H

## (undated) DEVICE — DRAIN SURG SGL COLL PT TB FOR ATS BG OASIS

## (undated) DEVICE — SUTURE PROL DBL ARMED 8 0 BV130 5 24IN N ABSRB MFIL BLU M8739

## (undated) DEVICE — CLAMP INSERT: Brand: STEALTH® CLAMP INSERT

## (undated) DEVICE — CABG DR WILLIAMS: Brand: MEDLINE INDUSTRIES, INC.

## (undated) DEVICE — SOL IRR SOD CHL 0.9% TITAN XL CNTNR 3000ML

## (undated) DEVICE — GUIDEWIRE VASC L260CM DIA0.035IN RAD 3MM J TIP L7CM PTFE

## (undated) DEVICE — SUTURE ETHIBOND EXCEL 2-0 L30IN NONABSORBABLE GRN L26MM SH MX563

## (undated) DEVICE — STERNUM BLADE (45.9 X 0.635MM)

## (undated) DEVICE — SYRINGE MED 10ML LUERLOCK TIP W/O SFTY DISP

## (undated) DEVICE — GLOVE SURG SZ 7 L11.2IN THK9.8MIL STRW LTX POLYMER BEAD CUF

## (undated) DEVICE — CATHETER THOR 24FR L22IN PVC 5 EYELET STR ATRAUM